# Patient Record
Sex: FEMALE | Race: WHITE | NOT HISPANIC OR LATINO | Employment: OTHER | URBAN - METROPOLITAN AREA
[De-identification: names, ages, dates, MRNs, and addresses within clinical notes are randomized per-mention and may not be internally consistent; named-entity substitution may affect disease eponyms.]

---

## 2022-05-04 ENCOUNTER — TELEPHONE (OUTPATIENT)
Dept: SURGERY | Facility: CLINIC | Age: 52
End: 2022-05-04
Payer: MEDICAID

## 2022-05-04 NOTE — TELEPHONE ENCOUNTER
Spoke with patient regarding appointment for referral. Instructed patient that we would need to get records for her scheduled appointment to discuss possible colostomy reversal. Records requested from [facility]. Patient states that she will go to medical records to obtain disc of imaging to bring with her to appointment. Appointment scheduled with Dr. Garces (patient would like soonest appointment available) and details confirmed verbally with patient.

## 2022-05-11 NOTE — PROGRESS NOTES
CRS Office Visit History and Physical    Referring Md:   No referring provider defined for this encounter.    SUBJECTIVE:     Chief Complaint: colostomy     History of Present Illness:  The patient is a new patient to this practice.   Course is as follows:  Marika Lindsey is a 51 y.o. female presents with an unwanted end colostomy.  She was hospitalized at [facility] in January 2022 for perforated diverticulitis.  She underwent two operations and has been doing wound care for her midline wound.  She is no longer requiring a wound vac.  She reports that she feels well physically and is eating well.  She has gained weight.  She is doing well with her colostomy.  She was seeing Dr. Gotti at [facility] for reversal but had last minute problems with her insurance.  She has had a colonoscopy since her surgery.  Labs drawn by Dr. Gotti were reportedly appropriate for surgery.     Last Colonoscopy: 2022      PMH:   perforated diverticulitis   hypothyroidism    PSH:   Exploratory laparotomy, sigmoid colectomy with end colostomy 1/2022, [facility]     FmHx: reviewed and noncontributory     Allergies: reviewed, NKDA     Review of Systems:  Review of Systems   Constitutional: Negative.    HENT: Negative.    Eyes: Negative.    Respiratory: Negative.    Cardiovascular: Negative.    Gastrointestinal:        See HPI   Genitourinary: Negative.    Musculoskeletal: Negative.    Skin: Negative.    Neurological: Negative.    Psychiatric/Behavioral: Negative.        OBJECTIVE:     Vital Signs (Most Recent)  There were no vitals taken for this visit.    Physical Exam:  General: 51 y.o. female in no distress   Neuro: alert and oriented x 4.  Moves all extremities.     HEENT: normocephalic, atraumatic, PERRL, EOMI   Respiratory: respirations are even and unlabored  Cardiac: regular rate and rhythm  Abdomen: soft, midline incision well small area of granulation tissue at superior aspect, larger area of granulation tissue at inferior aspect, no  erythema or drainage, end colostomy in place in LLQ   Extremities: Warm dry and intact  Skin: no rashes      Labs: will obtain outside records    Imaging: outside imaging obtained and personally reviewed       ASSESSMENT/PLAN:     There are no diagnoses linked to this encounter.    51 y.o. female with recent episode of perforated diverticulitis s/p exploratory laparotomy, sigmoid colectomy and end colostomy    - patient's outside records reviewed   - patient has had pre-op workup by Dr. Gotti.  Will obtain records from [facility]  - Surgery planned for 6/14.  Discussed possible conversion to open procedure.  Would like midline wound healed prior to proceeding.    - Follow up on 6/6 for pre-op paperwork     Adelina Garces MD  Staff Surgeon  Colon & Rectal Surgery

## 2022-05-13 ENCOUNTER — OFFICE VISIT (OUTPATIENT)
Dept: SURGERY | Facility: OTHER | Age: 52
End: 2022-05-13
Attending: SURGERY
Payer: MEDICAID

## 2022-05-13 VITALS
WEIGHT: 243.19 LBS | BODY MASS INDEX: 41.52 KG/M2 | SYSTOLIC BLOOD PRESSURE: 140 MMHG | HEIGHT: 64 IN | HEART RATE: 80 BPM | DIASTOLIC BLOOD PRESSURE: 83 MMHG

## 2022-05-13 DIAGNOSIS — Z93.3 COLOSTOMY IN PLACE: Primary | ICD-10-CM

## 2022-05-13 PROCEDURE — 99999 PR PBB SHADOW E&M-EST. PATIENT-LVL IV: ICD-10-PCS | Mod: PBBFAC,,, | Performed by: SURGERY

## 2022-05-13 PROCEDURE — 1159F PR MEDICATION LIST DOCUMENTED IN MEDICAL RECORD: ICD-10-PCS | Mod: CPTII,,, | Performed by: SURGERY

## 2022-05-13 PROCEDURE — 3079F DIAST BP 80-89 MM HG: CPT | Mod: CPTII,,, | Performed by: SURGERY

## 2022-05-13 PROCEDURE — 99204 OFFICE O/P NEW MOD 45 MIN: CPT | Mod: S$PBB,,, | Performed by: SURGERY

## 2022-05-13 PROCEDURE — 3077F SYST BP >= 140 MM HG: CPT | Mod: CPTII,,, | Performed by: SURGERY

## 2022-05-13 PROCEDURE — 3077F PR MOST RECENT SYSTOLIC BLOOD PRESSURE >= 140 MM HG: ICD-10-PCS | Mod: CPTII,,, | Performed by: SURGERY

## 2022-05-13 PROCEDURE — 3008F BODY MASS INDEX DOCD: CPT | Mod: CPTII,,, | Performed by: SURGERY

## 2022-05-13 PROCEDURE — 99204 PR OFFICE/OUTPT VISIT, NEW, LEVL IV, 45-59 MIN: ICD-10-PCS | Mod: S$PBB,,, | Performed by: SURGERY

## 2022-05-13 PROCEDURE — 99214 OFFICE O/P EST MOD 30 MIN: CPT | Mod: PBBFAC | Performed by: SURGERY

## 2022-05-13 PROCEDURE — 3008F PR BODY MASS INDEX (BMI) DOCUMENTED: ICD-10-PCS | Mod: CPTII,,, | Performed by: SURGERY

## 2022-05-13 PROCEDURE — 3079F PR MOST RECENT DIASTOLIC BLOOD PRESSURE 80-89 MM HG: ICD-10-PCS | Mod: CPTII,,, | Performed by: SURGERY

## 2022-05-13 PROCEDURE — 1159F MED LIST DOCD IN RCRD: CPT | Mod: CPTII,,, | Performed by: SURGERY

## 2022-05-13 PROCEDURE — 99999 PR PBB SHADOW E&M-EST. PATIENT-LVL IV: CPT | Mod: PBBFAC,,, | Performed by: SURGERY

## 2022-05-13 RX ORDER — LEVOTHYROXINE SODIUM 50 UG/1
50 TABLET ORAL
COMMUNITY
End: 2023-05-22

## 2022-05-16 RX ORDER — ACETAMINOPHEN 500 MG
1000 TABLET ORAL
Status: CANCELLED | OUTPATIENT
Start: 2022-05-16 | End: 2022-05-16

## 2022-05-18 ENCOUNTER — TELEPHONE (OUTPATIENT)
Dept: SURGERY | Facility: CLINIC | Age: 52
End: 2022-05-18
Payer: MEDICAID

## 2022-05-18 NOTE — TELEPHONE ENCOUNTER
Contacted [facility] to follow-up on request for colonoscopy report, pathology and operative reports. Informed that her information would have to be obtained through medical records.

## 2022-05-20 ENCOUNTER — TELEPHONE (OUTPATIENT)
Dept: SURGERY | Facility: CLINIC | Age: 52
End: 2022-05-20
Payer: MEDICAID

## 2022-06-06 ENCOUNTER — TELEPHONE (OUTPATIENT)
Dept: SURGERY | Facility: CLINIC | Age: 52
End: 2022-06-06
Payer: MEDICAID

## 2022-06-06 ENCOUNTER — OFFICE VISIT (OUTPATIENT)
Dept: SURGERY | Facility: OTHER | Age: 52
End: 2022-06-06
Attending: SURGERY
Payer: MEDICAID

## 2022-06-06 VITALS
SYSTOLIC BLOOD PRESSURE: 152 MMHG | DIASTOLIC BLOOD PRESSURE: 80 MMHG | HEIGHT: 64 IN | HEART RATE: 79 BPM | BODY MASS INDEX: 42.12 KG/M2 | WEIGHT: 246.69 LBS

## 2022-06-06 DIAGNOSIS — Z93.3 COLOSTOMY IN PLACE: Primary | ICD-10-CM

## 2022-06-06 PROCEDURE — 99213 OFFICE O/P EST LOW 20 MIN: CPT | Mod: PBBFAC | Performed by: SURGERY

## 2022-06-06 PROCEDURE — 3079F DIAST BP 80-89 MM HG: CPT | Mod: CPTII,,, | Performed by: SURGERY

## 2022-06-06 PROCEDURE — 3077F SYST BP >= 140 MM HG: CPT | Mod: CPTII,,, | Performed by: SURGERY

## 2022-06-06 PROCEDURE — 99213 OFFICE O/P EST LOW 20 MIN: CPT | Mod: S$PBB,,, | Performed by: SURGERY

## 2022-06-06 PROCEDURE — 99999 PR PBB SHADOW E&M-EST. PATIENT-LVL III: CPT | Mod: PBBFAC,,, | Performed by: SURGERY

## 2022-06-06 PROCEDURE — 1159F PR MEDICATION LIST DOCUMENTED IN MEDICAL RECORD: ICD-10-PCS | Mod: CPTII,,, | Performed by: SURGERY

## 2022-06-06 PROCEDURE — 3008F BODY MASS INDEX DOCD: CPT | Mod: CPTII,,, | Performed by: SURGERY

## 2022-06-06 PROCEDURE — 1159F MED LIST DOCD IN RCRD: CPT | Mod: CPTII,,, | Performed by: SURGERY

## 2022-06-06 PROCEDURE — 3077F PR MOST RECENT SYSTOLIC BLOOD PRESSURE >= 140 MM HG: ICD-10-PCS | Mod: CPTII,,, | Performed by: SURGERY

## 2022-06-06 PROCEDURE — 3008F PR BODY MASS INDEX (BMI) DOCUMENTED: ICD-10-PCS | Mod: CPTII,,, | Performed by: SURGERY

## 2022-06-06 PROCEDURE — 99999 PR PBB SHADOW E&M-EST. PATIENT-LVL III: ICD-10-PCS | Mod: PBBFAC,,, | Performed by: SURGERY

## 2022-06-06 PROCEDURE — 3079F PR MOST RECENT DIASTOLIC BLOOD PRESSURE 80-89 MM HG: ICD-10-PCS | Mod: CPTII,,, | Performed by: SURGERY

## 2022-06-06 PROCEDURE — 99213 PR OFFICE/OUTPT VISIT, EST, LEVL III, 20-29 MIN: ICD-10-PCS | Mod: S$PBB,,, | Performed by: SURGERY

## 2022-06-06 RX ORDER — NEOMYCIN SULFATE 500 MG/1
TABLET ORAL
Qty: 6 TABLET | Refills: 0 | Status: ON HOLD | OUTPATIENT
Start: 2022-06-06 | End: 2022-06-16 | Stop reason: HOSPADM

## 2022-06-06 RX ORDER — METRONIDAZOLE 500 MG/1
TABLET ORAL
Qty: 3 TABLET | Refills: 0 | Status: ON HOLD | OUTPATIENT
Start: 2022-06-06 | End: 2022-06-16 | Stop reason: HOSPADM

## 2022-06-06 NOTE — TELEPHONE ENCOUNTER
Spoke with pt and advised to call wound care nurse to discuss if she would be treated by hospital staff while inpatient or regular home health nurse. Pt confirmed.

## 2022-06-06 NOTE — H&P (VIEW-ONLY)
Colon & Rectal Surgery Clinic Follow Up    HPI:   Marika Lindsey is a 51 y.o. female who presents for follow up for pre-op visit prior to ostomy closure.  Her midline wound has continued to heal, very small area of granulation tissue remains.  Continues to eat well.  Ostomy functioning without difficulty.  Walking and staying active.  Records obtained from [facility] and reviewed.       Objective:   Vitals:    06/06/22 0852   BP: (!) 152/80   Pulse: 79        Physical Exam   Gen: well developed female, NAD   HEENT: normocephalic, atraumatic, PERRL, EOMI   CV: RRR, no murmurs  Res: nonlabored, CTAB   Abd: soft, midline incision with small area of granulation tissue at base, ostomy pink and patent  MSK: no gross deformities, no cyanosis or edema   Neuro: II-XII grossly intact     Assessment and Plan:   Marika Lindsey  is a 51 y.o. female who presents for follow up for upcoming ostomy reversal    - Outside records from [facility] reviewed.  Endoscopy report from Dr. Gotti with approximately 20 cm rectal stump.    - Patient's wound is healing well.  Appropriate to proceed with ostomy reversal.   - We discussed the risks, benefits and alternatives of the procedure.  We discussed the risk of anastomotic leak, permanent changes in bowel function, wound complications.  Patient does have a remote history of ureteral reconstruction as a child in the 1970s.    - Consent obtained.  Proceed with surgery on 6/14.       Adelina Garces MD  Staff Surgeon   Colon & Rectal Surgery

## 2022-06-06 NOTE — TELEPHONE ENCOUNTER
"Spoke with pt regarding wound that is being treated by wound care. Pt stated, "she did not know if she needed to coordinate with wound care while she was admitted for her procedure." Advised per Dr. Garces that she should not need wound care during stay but if needed, it would be done in hospital. Pt expressed understanding and stated, "It's just a lot to have to handle or think about before the procedure."       ----- Message from Marika Ureña sent at 6/6/2022 12:16 PM CDT -----  Contact: @ [phone]  Pt is calling she would like a nurse to call she had a appointment this morning and she wants to tell them something they need to know before her surgery please call @ [phone]      "

## 2022-06-06 NOTE — PROGRESS NOTES
Colon & Rectal Surgery Clinic Follow Up    HPI:   Marika Lindsey is a 51 y.o. female who presents for follow up for pre-op visit prior to ostomy closure.  Her midline wound has continued to heal, very small area of granulation tissue remains.  Continues to eat well.  Ostomy functioning without difficulty.  Walking and staying active.  Records obtained from [facility] and reviewed.       Objective:   Vitals:    06/06/22 0852   BP: (!) 152/80   Pulse: 79        Physical Exam   Gen: well developed female, NAD   HEENT: normocephalic, atraumatic, PERRL, EOMI   CV: RRR, no murmurs  Res: nonlabored, CTAB   Abd: soft, midline incision with small area of granulation tissue at base, ostomy pink and patent  MSK: no gross deformities, no cyanosis or edema   Neuro: II-XII grossly intact     Assessment and Plan:   Marika Lindsey  is a 51 y.o. female who presents for follow up for upcoming ostomy reversal    - Outside records from [facility] reviewed.  Endoscopy report from Dr. Gotti with approximately 20 cm rectal stump.    - Patient's wound is healing well.  Appropriate to proceed with ostomy reversal.   - We discussed the risks, benefits and alternatives of the procedure.  We discussed the risk of anastomotic leak, permanent changes in bowel function, wound complications.  Patient does have a remote history of ureteral reconstruction as a child in the 1970s.    - Consent obtained.  Proceed with surgery on 6/14.       Adelina Garces MD  Staff Surgeon   Colon & Rectal Surgery

## 2022-06-07 ENCOUNTER — ANESTHESIA EVENT (OUTPATIENT)
Dept: SURGERY | Facility: OTHER | Age: 52
DRG: 330 | End: 2022-06-07
Payer: MEDICAID

## 2022-06-07 ENCOUNTER — HOSPITAL ENCOUNTER (OUTPATIENT)
Dept: PREADMISSION TESTING | Facility: OTHER | Age: 52
Discharge: HOME | End: 2022-06-07
Attending: SURGERY
Payer: MEDICAID

## 2022-06-07 VITALS
HEIGHT: 64 IN | OXYGEN SATURATION: 97 % | HEART RATE: 85 BPM | BODY MASS INDEX: 41.83 KG/M2 | RESPIRATION RATE: 16 BRPM | DIASTOLIC BLOOD PRESSURE: 85 MMHG | WEIGHT: 245 LBS | TEMPERATURE: 98 F | SYSTOLIC BLOOD PRESSURE: 127 MMHG

## 2022-06-07 DIAGNOSIS — Z01.818 PREOP TESTING: Primary | ICD-10-CM

## 2022-06-07 LAB
ABO + RH BLD: NORMAL
ANION GAP SERPL CALC-SCNC: 11 MMOL/L (ref 8–16)
BASOPHILS # BLD AUTO: 0.04 K/UL (ref 0–0.2)
BASOPHILS NFR BLD: 0.6 % (ref 0–1.9)
BLD GP AB SCN CELLS X3 SERPL QL: NORMAL
BUN SERPL-MCNC: 19 MG/DL (ref 6–20)
CALCIUM SERPL-MCNC: 10 MG/DL (ref 8.7–10.5)
CHLORIDE SERPL-SCNC: 104 MMOL/L (ref 95–110)
CO2 SERPL-SCNC: 23 MMOL/L (ref 23–29)
CREAT SERPL-MCNC: 0.9 MG/DL (ref 0.5–1.4)
DIFFERENTIAL METHOD: ABNORMAL
EOSINOPHIL # BLD AUTO: 0.3 K/UL (ref 0–0.5)
EOSINOPHIL NFR BLD: 3.7 % (ref 0–8)
ERYTHROCYTE [DISTWIDTH] IN BLOOD BY AUTOMATED COUNT: 15.4 % (ref 11.5–14.5)
EST. GFR  (AFRICAN AMERICAN): >60 ML/MIN/1.73 M^2
EST. GFR  (NON AFRICAN AMERICAN): >60 ML/MIN/1.73 M^2
GLUCOSE SERPL-MCNC: 97 MG/DL (ref 70–110)
HCT VFR BLD AUTO: 41.2 % (ref 37–48.5)
HGB BLD-MCNC: 13.1 G/DL (ref 12–16)
IMM GRANULOCYTES # BLD AUTO: 0.01 K/UL (ref 0–0.04)
IMM GRANULOCYTES NFR BLD AUTO: 0.1 % (ref 0–0.5)
LYMPHOCYTES # BLD AUTO: 1.7 K/UL (ref 1–4.8)
LYMPHOCYTES NFR BLD: 23.8 % (ref 18–48)
MCH RBC QN AUTO: 26 PG (ref 27–31)
MCHC RBC AUTO-ENTMCNC: 31.8 G/DL (ref 32–36)
MCV RBC AUTO: 82 FL (ref 82–98)
MONOCYTES # BLD AUTO: 0.6 K/UL (ref 0.3–1)
MONOCYTES NFR BLD: 8 % (ref 4–15)
NEUTROPHILS # BLD AUTO: 4.5 K/UL (ref 1.8–7.7)
NEUTROPHILS NFR BLD: 63.8 % (ref 38–73)
NRBC BLD-RTO: 0 /100 WBC
PLATELET # BLD AUTO: 299 K/UL (ref 150–450)
PMV BLD AUTO: 9.8 FL (ref 9.2–12.9)
POTASSIUM SERPL-SCNC: 4.8 MMOL/L (ref 3.5–5.1)
RBC # BLD AUTO: 5.04 M/UL (ref 4–5.4)
SODIUM SERPL-SCNC: 138 MMOL/L (ref 136–145)
WBC # BLD AUTO: 6.98 K/UL (ref 3.9–12.7)

## 2022-06-07 PROCEDURE — 86850 RBC ANTIBODY SCREEN: CPT | Performed by: NURSE PRACTITIONER

## 2022-06-07 PROCEDURE — 86900 BLOOD TYPING SEROLOGIC ABO: CPT | Performed by: NURSE PRACTITIONER

## 2022-06-07 PROCEDURE — 80048 BASIC METABOLIC PNL TOTAL CA: CPT | Performed by: ANESTHESIOLOGY

## 2022-06-07 PROCEDURE — 36415 COLL VENOUS BLD VENIPUNCTURE: CPT | Performed by: ANESTHESIOLOGY

## 2022-06-07 PROCEDURE — 85025 COMPLETE CBC W/AUTO DIFF WBC: CPT | Performed by: ANESTHESIOLOGY

## 2022-06-07 RX ORDER — ASCORBIC ACID 500 MG
500 TABLET ORAL DAILY
COMMUNITY
End: 2023-05-05

## 2022-06-07 RX ORDER — ACETAMINOPHEN 500 MG
1000 TABLET ORAL
Status: CANCELLED | OUTPATIENT
Start: 2022-06-07 | End: 2022-06-07

## 2022-06-07 RX ORDER — NICOTINE POLACRILEX 2 MG
1 GUM BUCCAL
COMMUNITY
End: 2023-05-05

## 2022-06-07 RX ORDER — ACETAMINOPHEN 500 MG
500 TABLET ORAL EVERY 6 HOURS PRN
COMMUNITY
End: 2022-12-30

## 2022-06-07 RX ORDER — SODIUM CHLORIDE, SODIUM LACTATE, POTASSIUM CHLORIDE, CALCIUM CHLORIDE 600; 310; 30; 20 MG/100ML; MG/100ML; MG/100ML; MG/100ML
INJECTION, SOLUTION INTRAVENOUS CONTINUOUS
Status: CANCELLED | OUTPATIENT
Start: 2022-06-07

## 2022-06-07 RX ORDER — LIDOCAINE HYDROCHLORIDE 10 MG/ML
0.5 INJECTION, SOLUTION EPIDURAL; INFILTRATION; INTRACAUDAL; PERINEURAL ONCE
Status: CANCELLED | OUTPATIENT
Start: 2022-06-07 | End: 2022-06-07

## 2022-06-07 RX ORDER — CHOLECALCIFEROL (VITAMIN D3) 25 MCG
5000 TABLET ORAL DAILY
COMMUNITY
End: 2022-12-30 | Stop reason: SDUPTHER

## 2022-06-07 RX ORDER — FAMOTIDINE 20 MG/1
20 TABLET, FILM COATED ORAL
Status: CANCELLED | OUTPATIENT
Start: 2022-06-07 | End: 2022-06-07

## 2022-06-07 RX ORDER — PREGABALIN 50 MG/1
50 CAPSULE ORAL ONCE
Status: CANCELLED | OUTPATIENT
Start: 2022-06-07 | End: 2022-06-07

## 2022-06-07 NOTE — DISCHARGE INSTRUCTIONS
Information to Prepare you for your Surgery    PRE-ADMIT TESTING -  [phone]    [address] [facility]          Your surgery has been scheduled at [facility]. We are pleased to have the opportunity to serve you. For Further Information please call [phone].    On the day of surgery please report to the Information Desk on the 1st floor.    CONTACT YOUR PHYSICIAN'S OFFICE THE DAY PRIOR TO YOUR SURGERY TO OBTAIN YOUR ARRIVAL TIME.     The evening before surgery do not eat anything after 9 p.m. ( this includes hard candy, chewing gum and mints).  You may only have GATORADE, POWERADE AND WATER  from 9 p.m. until you leave your home.   DO NOT DRINK ANY LIQUIDS ON THE WAY TO THE HOSPITAL.      Why does your anesthesiologist allow you to drink Gatorade/Powerade before surgery?  Gatorade/Powerade helps to increase your comfort before surgery and to decrease your nausea after surgery. The carbohydrates in Gatorade/Powerade help reduce your body's stress response to surgery.  If you are a diabetic-drink only water prior to surgery.      Current Visitor policy(12/27/2021) - Patients may have 2 visitors pre and post procedure. Only 2 visitors will be allowed in the Surgical building with the patient.     SPECIAL MEDICATION INSTRUCTIONS: TAKE medications checked off by the Anesthesiologist on your Medication List.    Angiogram Patients: Take medications as instructed by your physician, including aspirin.     Surgery Patients:    If you take ASPIRIN - Your PHYSICIAN/SURGEON will need to inform you IF/OR when you need to stop taking aspirin prior to your surgery.     Do Not take any medications containing IBUPROFEN.    Do Not Wear any make-up (especially eye make-up) to surgery. Please remove any false eyelashes or eyelash extensions. If you arrive the day of surgery with makeup/eyelashes on you will be required to remove prior to surgery. (There is a risk of corneal  abrasions if eye makeup/eyelash extensions are not removed)      Leave all valuables at home.   Do Not wear any jewelry or watches, including any metal in body piercings. Jewelry must be removed prior to coming to the hospital.  There is a possibility that rings that are unable to be removed may be cut off if they are on the surgical extremity.    Please remove all hair extensions, wigs, clips and any other metal accessories/ ornaments from your hair.  These items may pose a flammable/fire risk in Surgery and must be removed.    Do not shave your surgical area at least 5 days prior to your surgery. The surgical prep will be performed at the hospital according to Infection Control regulations.    Contact Lens must be removed before surgery. Either do not wear the contact lens or bring a case and solution for storage.  Please bring a container for eyeglasses or dentures as required.  Bring any paperwork your physician has provided, such as consent forms,  history and physicals, doctor's orders, etc.   Bring comfortable clothes that are loose fitting to wear upon discharge. Take into consideration the type of surgery being performed.  Maintain your diet as advised per your physician the day prior to surgery.      Adequate rest the night before surgery is advised.   Park in the Parking lot behind the hospital or in the [facility] across the street from the parking lot. Parking is complimentary.  If you will be discharged the same day as your procedure, please arrange for a responsible adult to drive you home or to accompany you if traveling by taxi.   YOU WILL NOT BE PERMITTED TO DRIVE OR TO LEAVE THE HOSPITAL ALONE AFTER SURGERY.   If you are being discharged the same day, it is strongly recommended that you arrange for someone to remain with you for the first 24 hrs following your surgery.    The Surgeon will speak to your family/visitor after your surgery regarding the outcome of your surgery and post op  care.  The Surgeon may speak to you after your surgery, but there is a possibility you may not remember the details.  Please check with your family members regarding the conversation with the Surgeon.    We strongly recommend whoever is bringing you home be present for discharge instructions.  This will ensure a thorough understanding for your post op home care.    ALL CHILDREN MUST ALWAYS BE ACCOMPANIED BY AN ADULT.    Visitors-Refer to current Visitor policy handouts.    Thank you for your cooperation.  The Staff of [facility].            Bathing Instructions with Hibiclens    Shower the evening before and morning of your procedure with Hibiclens:  Wash your face with water and your regular face wash/soap  Apply Hibiclens directly on your skin or on a wet washcloth and wash gently. When showering: Move away from the shower stream when applying Hibiclens to avoid rinsing off too soon.  Rinse thoroughly with warm water  Do not dilute Hibiclens        Dry off as usual, do not use any deodorant, powder, body lotions, perfume, after shave or cologne.

## 2022-06-07 NOTE — ANESTHESIA PREPROCEDURE EVALUATION
06/07/2022  Marika Lindsey is a 51 y.o., female.      Pre-op Assessment    I have reviewed the Patient Summary Reports.     I have reviewed the Nursing Notes. I have reviewed the NPO Status.   I have reviewed the Medications.     Review of Systems  Anesthesia Hx:  History of prior surgery of interest to airway management or planning: Previous anesthesia: General multiple GI surgeries past year with general anesthesia.  Denies Family Hx of Anesthesia complications.   Denies Personal Hx of Anesthesia complications.   Social:  Non-Smoker    Hematology/Oncology:  Hematology Normal   Oncology Normal     EENT/Dental:EENT/Dental Normal   Cardiovascular:  Cardiovascular Normal     Pulmonary:  Pulmonary Normal    Renal/:  Renal/ Normal     Hepatic/GI:   GERD perf diverticula, multiple surgeries following, now for colostomy closure   Musculoskeletal:  Musculoskeletal Normal    Neurological:  Neurology Normal    Endocrine:   Hypothyroidism  Morbid Obesity / BMI > 40  Dermatological:  Skin Normal    Psych:  Psychiatric Normal           Physical Exam  General: Cooperative, Alert and Oriented    Airway:  Mallampati: I   Mouth Opening: Normal  TM Distance: Normal  Neck ROM: Normal ROM    Dental:  Intact        Anesthesia Plan  Type of Anesthesia, risks & benefits discussed:    Anesthesia Type: Gen ETT  Intra-op Monitoring Plan: Standard ASA Monitors  Post Op Pain Control Plan: multimodal analgesia, IV/PO Opioids PRN and peripheral nerve block  Induction:  IV  Airway Plan: Video  Informed Consent: Informed consent signed with the Patient and all parties understand the risks and agree with anesthesia plan.  All questions answered.   ASA Score: 3  Anesthesia Plan Notes: TAP discussed if possible with position of colostomy, pt reports abdominal wound anesthetic    Labs today        Ready For Surgery From Anesthesia  Perspective.     .

## 2022-06-13 ENCOUNTER — TELEPHONE (OUTPATIENT)
Dept: SURGERY | Facility: CLINIC | Age: 52
End: 2022-06-13
Payer: MEDICAID

## 2022-06-13 NOTE — TELEPHONE ENCOUNTER
Spoke with patient informing of 0500 arrival time for procedure. Location and time confirmed by patient. Patient inquired about wound care following surgery and informed that wound care would be provided while inpatient by staff. Patient verbalized understanding.

## 2022-06-14 ENCOUNTER — HOSPITAL ENCOUNTER (INPATIENT)
Facility: OTHER | Age: 52
LOS: 2 days | Discharge: HOME HEALTH | DRG: 330 | End: 2022-06-16
Attending: SURGERY | Admitting: SURGERY
Payer: MEDICAID

## 2022-06-14 ENCOUNTER — ANESTHESIA (OUTPATIENT)
Dept: SURGERY | Facility: OTHER | Age: 52
DRG: 330 | End: 2022-06-14
Payer: MEDICAID

## 2022-06-14 DIAGNOSIS — Z93.3 COLOSTOMY PRESENT: ICD-10-CM

## 2022-06-14 PROCEDURE — 37000008 HC ANESTHESIA 1ST 15 MINUTES: Performed by: SURGERY

## 2022-06-14 PROCEDURE — P9045 ALBUMIN (HUMAN), 5%, 250 ML: HCPCS | Mod: JG | Performed by: NURSE ANESTHETIST, CERTIFIED REGISTERED

## 2022-06-14 PROCEDURE — 88304 PR  SURG PATH,LEVEL III: ICD-10-PCS | Mod: 26,,, | Performed by: PATHOLOGY

## 2022-06-14 PROCEDURE — 63600175 PHARM REV CODE 636 W HCPCS: Performed by: ANESTHESIOLOGY

## 2022-06-14 PROCEDURE — 37000009 HC ANESTHESIA EA ADD 15 MINS: Performed by: SURGERY

## 2022-06-14 PROCEDURE — 25000003 PHARM REV CODE 250: Performed by: NURSE PRACTITIONER

## 2022-06-14 PROCEDURE — 63600175 PHARM REV CODE 636 W HCPCS: Performed by: NURSE PRACTITIONER

## 2022-06-14 PROCEDURE — 25000003 PHARM REV CODE 250: Performed by: ANESTHESIOLOGY

## 2022-06-14 PROCEDURE — 44227 PR LAP, SURG CLOSE ENTEROSTOMY RESECT ANAST: ICD-10-PCS | Mod: ,,, | Performed by: SURGERY

## 2022-06-14 PROCEDURE — 25000003 PHARM REV CODE 250: Performed by: SURGERY

## 2022-06-14 PROCEDURE — 36000713 HC OR TIME LEV V EA ADD 15 MIN: Performed by: SURGERY

## 2022-06-14 PROCEDURE — A4216 STERILE WATER/SALINE, 10 ML: HCPCS | Performed by: NURSE ANESTHETIST, CERTIFIED REGISTERED

## 2022-06-14 PROCEDURE — C9290 INJ, BUPIVACAINE LIPOSOME: HCPCS | Performed by: ANESTHESIOLOGY

## 2022-06-14 PROCEDURE — 71000033 HC RECOVERY, INTIAL HOUR: Performed by: SURGERY

## 2022-06-14 PROCEDURE — 36000712 HC OR TIME LEV V 1ST 15 MIN: Performed by: SURGERY

## 2022-06-14 PROCEDURE — 44227 LAP CLOSE ENTEROSTOMY: CPT | Mod: ,,, | Performed by: SURGERY

## 2022-06-14 PROCEDURE — 63600175 PHARM REV CODE 636 W HCPCS: Performed by: SURGERY

## 2022-06-14 PROCEDURE — 88304 TISSUE EXAM BY PATHOLOGIST: CPT | Performed by: PATHOLOGY

## 2022-06-14 PROCEDURE — 27201423 OPTIME MED/SURG SUP & DEVICES STERILE SUPPLY: Performed by: SURGERY

## 2022-06-14 PROCEDURE — 99900035 HC TECH TIME PER 15 MIN (STAT)

## 2022-06-14 PROCEDURE — 71000039 HC RECOVERY, EACH ADD'L HOUR: Performed by: SURGERY

## 2022-06-14 PROCEDURE — 63600175 PHARM REV CODE 636 W HCPCS: Performed by: NURSE ANESTHETIST, CERTIFIED REGISTERED

## 2022-06-14 PROCEDURE — 88304 TISSUE EXAM BY PATHOLOGIST: CPT | Mod: 26,,, | Performed by: PATHOLOGY

## 2022-06-14 PROCEDURE — 94799 UNLISTED PULMONARY SVC/PX: CPT

## 2022-06-14 PROCEDURE — 64488 TAP BLOCK BI INJECTION: CPT | Performed by: ANESTHESIOLOGY

## 2022-06-14 PROCEDURE — 11000001 HC ACUTE MED/SURG PRIVATE ROOM

## 2022-06-14 PROCEDURE — 25000003 PHARM REV CODE 250: Performed by: NURSE ANESTHETIST, CERTIFIED REGISTERED

## 2022-06-14 PROCEDURE — S0030 INJECTION, METRONIDAZOLE: HCPCS | Performed by: NURSE PRACTITIONER

## 2022-06-14 RX ORDER — ONDANSETRON 2 MG/ML
INJECTION INTRAMUSCULAR; INTRAVENOUS
Status: DISCONTINUED | OUTPATIENT
Start: 2022-06-14 | End: 2022-06-14

## 2022-06-14 RX ORDER — PROCHLORPERAZINE EDISYLATE 5 MG/ML
5 INJECTION INTRAMUSCULAR; INTRAVENOUS EVERY 30 MIN PRN
Status: DISCONTINUED | OUTPATIENT
Start: 2022-06-14 | End: 2022-06-14 | Stop reason: HOSPADM

## 2022-06-14 RX ORDER — SODIUM CHLORIDE 9 MG/ML
INJECTION, SOLUTION INTRAVENOUS CONTINUOUS
Status: DISCONTINUED | OUTPATIENT
Start: 2022-06-14 | End: 2022-06-15

## 2022-06-14 RX ORDER — PREGABALIN 50 MG/1
50 CAPSULE ORAL ONCE
Status: DISCONTINUED | OUTPATIENT
Start: 2022-06-14 | End: 2022-06-14

## 2022-06-14 RX ORDER — PHENYLEPHRINE HYDROCHLORIDE 10 MG/ML
INJECTION INTRAVENOUS
Status: DISCONTINUED | OUTPATIENT
Start: 2022-06-14 | End: 2022-06-14

## 2022-06-14 RX ORDER — LIDOCAINE HYDROCHLORIDE 10 MG/ML
1 INJECTION, SOLUTION EPIDURAL; INFILTRATION; INTRACAUDAL; PERINEURAL ONCE
Status: ACTIVE | OUTPATIENT
Start: 2022-06-14

## 2022-06-14 RX ORDER — MUPIROCIN 20 MG/G
OINTMENT TOPICAL
Status: DISPENSED | OUTPATIENT
Start: 2022-06-14

## 2022-06-14 RX ORDER — GABAPENTIN 300 MG/1
300 CAPSULE ORAL 3 TIMES DAILY
Status: DISCONTINUED | OUTPATIENT
Start: 2022-06-14 | End: 2022-06-16 | Stop reason: HOSPADM

## 2022-06-14 RX ORDER — KETAMINE HCL IN 0.9 % NACL 50 MG/5 ML
SYRINGE (ML) INTRAVENOUS
Status: DISCONTINUED | OUTPATIENT
Start: 2022-06-14 | End: 2022-06-14

## 2022-06-14 RX ORDER — ROCURONIUM BROMIDE 10 MG/ML
INJECTION, SOLUTION INTRAVENOUS
Status: DISCONTINUED | OUTPATIENT
Start: 2022-06-14 | End: 2022-06-14

## 2022-06-14 RX ORDER — FAMOTIDINE 20 MG/1
20 TABLET, FILM COATED ORAL
Status: COMPLETED | OUTPATIENT
Start: 2022-06-14 | End: 2022-06-14

## 2022-06-14 RX ORDER — LIDOCAINE HYDROCHLORIDE 20 MG/ML
INJECTION INTRAVENOUS
Status: DISCONTINUED | OUTPATIENT
Start: 2022-06-14 | End: 2022-06-14

## 2022-06-14 RX ORDER — DIPHENHYDRAMINE HYDROCHLORIDE 50 MG/ML
INJECTION INTRAMUSCULAR; INTRAVENOUS
Status: DISCONTINUED | OUTPATIENT
Start: 2022-06-14 | End: 2022-06-14

## 2022-06-14 RX ORDER — ALBUMIN HUMAN 50 G/1000ML
SOLUTION INTRAVENOUS CONTINUOUS PRN
Status: DISCONTINUED | OUTPATIENT
Start: 2022-06-14 | End: 2022-06-14

## 2022-06-14 RX ORDER — ACETAMINOPHEN 500 MG
1000 TABLET ORAL
Status: COMPLETED | OUTPATIENT
Start: 2022-06-14 | End: 2022-06-14

## 2022-06-14 RX ORDER — BUPIVACAINE HYDROCHLORIDE 2.5 MG/ML
INJECTION, SOLUTION EPIDURAL; INFILTRATION; INTRACAUDAL
Status: COMPLETED | OUTPATIENT
Start: 2022-06-14 | End: 2022-06-14

## 2022-06-14 RX ORDER — INDOCYANINE GREEN AND WATER 25 MG
KIT INJECTION
Status: DISCONTINUED | OUTPATIENT
Start: 2022-06-14 | End: 2022-06-14

## 2022-06-14 RX ORDER — HYDROMORPHONE HYDROCHLORIDE 2 MG/ML
0.4 INJECTION, SOLUTION INTRAMUSCULAR; INTRAVENOUS; SUBCUTANEOUS EVERY 5 MIN PRN
Status: DISCONTINUED | OUTPATIENT
Start: 2022-06-14 | End: 2022-06-14 | Stop reason: HOSPADM

## 2022-06-14 RX ORDER — FENTANYL CITRATE 50 UG/ML
INJECTION, SOLUTION INTRAMUSCULAR; INTRAVENOUS
Status: DISCONTINUED | OUTPATIENT
Start: 2022-06-14 | End: 2022-06-14

## 2022-06-14 RX ORDER — PREGABALIN 50 MG/1
50 CAPSULE ORAL
Status: COMPLETED | OUTPATIENT
Start: 2022-06-14 | End: 2022-06-14

## 2022-06-14 RX ORDER — OXYCODONE HYDROCHLORIDE 5 MG/1
5 TABLET ORAL
Status: DISCONTINUED | OUTPATIENT
Start: 2022-06-14 | End: 2022-06-14 | Stop reason: HOSPADM

## 2022-06-14 RX ORDER — PROPOFOL 10 MG/ML
VIAL (ML) INTRAVENOUS
Status: DISCONTINUED | OUTPATIENT
Start: 2022-06-14 | End: 2022-06-14

## 2022-06-14 RX ORDER — METRONIDAZOLE 500 MG/100ML
500 INJECTION, SOLUTION INTRAVENOUS
Status: COMPLETED | OUTPATIENT
Start: 2022-06-14 | End: 2022-06-14

## 2022-06-14 RX ORDER — LIDOCAINE HYDROCHLORIDE 10 MG/ML
0.5 INJECTION, SOLUTION EPIDURAL; INFILTRATION; INTRACAUDAL; PERINEURAL ONCE
Status: DISCONTINUED | OUTPATIENT
Start: 2022-06-14 | End: 2022-06-14 | Stop reason: HOSPADM

## 2022-06-14 RX ORDER — DEXAMETHASONE SODIUM PHOSPHATE 4 MG/ML
INJECTION, SOLUTION INTRA-ARTICULAR; INTRALESIONAL; INTRAMUSCULAR; INTRAVENOUS; SOFT TISSUE
Status: DISCONTINUED | OUTPATIENT
Start: 2022-06-14 | End: 2022-06-14

## 2022-06-14 RX ORDER — SODIUM CHLORIDE 9 MG/ML
INJECTION, SOLUTION INTRAVENOUS CONTINUOUS
Status: DISCONTINUED | OUTPATIENT
Start: 2022-06-14 | End: 2022-06-14

## 2022-06-14 RX ORDER — SODIUM CHLORIDE, SODIUM LACTATE, POTASSIUM CHLORIDE, CALCIUM CHLORIDE 600; 310; 30; 20 MG/100ML; MG/100ML; MG/100ML; MG/100ML
INJECTION, SOLUTION INTRAVENOUS CONTINUOUS
Status: DISCONTINUED | OUTPATIENT
Start: 2022-06-14 | End: 2022-06-14

## 2022-06-14 RX ORDER — GABAPENTIN 100 MG/1
200 CAPSULE ORAL
Status: DISCONTINUED | OUTPATIENT
Start: 2022-06-14 | End: 2022-06-14

## 2022-06-14 RX ORDER — HYDROMORPHONE HYDROCHLORIDE 1 MG/ML
0.25 INJECTION, SOLUTION INTRAMUSCULAR; INTRAVENOUS; SUBCUTANEOUS EVERY 4 HOURS PRN
Status: DISCONTINUED | OUTPATIENT
Start: 2022-06-14 | End: 2022-06-16 | Stop reason: HOSPADM

## 2022-06-14 RX ORDER — LIDOCAINE HCL/PF 100 MG/5ML
SYRINGE (ML) INTRAVENOUS
Status: DISCONTINUED | OUTPATIENT
Start: 2022-06-14 | End: 2022-06-14

## 2022-06-14 RX ORDER — CEFTRIAXONE 2 G/50ML
2 INJECTION, SOLUTION INTRAVENOUS
Status: COMPLETED | OUTPATIENT
Start: 2022-06-14 | End: 2022-06-14

## 2022-06-14 RX ORDER — IBUPROFEN 400 MG/1
800 TABLET ORAL EVERY 8 HOURS
Status: DISCONTINUED | OUTPATIENT
Start: 2022-06-15 | End: 2022-06-16 | Stop reason: HOSPADM

## 2022-06-14 RX ORDER — ACETAMINOPHEN 500 MG
1000 TABLET ORAL
Status: DISCONTINUED | OUTPATIENT
Start: 2022-06-14 | End: 2022-06-14

## 2022-06-14 RX ORDER — SODIUM CHLORIDE 0.9 % (FLUSH) 0.9 %
10 SYRINGE (ML) INJECTION
Status: DISCONTINUED | OUTPATIENT
Start: 2022-06-14 | End: 2022-06-16 | Stop reason: HOSPADM

## 2022-06-14 RX ORDER — DIPHENHYDRAMINE HYDROCHLORIDE 50 MG/ML
25 INJECTION INTRAMUSCULAR; INTRAVENOUS EVERY 6 HOURS PRN
Status: DISCONTINUED | OUTPATIENT
Start: 2022-06-14 | End: 2022-06-14 | Stop reason: HOSPADM

## 2022-06-14 RX ORDER — HEPARIN SODIUM 5000 [USP'U]/ML
5000 INJECTION, SOLUTION INTRAVENOUS; SUBCUTANEOUS ONCE
Status: COMPLETED | OUTPATIENT
Start: 2022-06-14 | End: 2022-06-14

## 2022-06-14 RX ORDER — SODIUM CHLORIDE 0.9 % (FLUSH) 0.9 %
3 SYRINGE (ML) INJECTION
Status: DISCONTINUED | OUTPATIENT
Start: 2022-06-14 | End: 2022-06-16 | Stop reason: HOSPADM

## 2022-06-14 RX ORDER — IBUPROFEN 600 MG/1
600 TABLET ORAL
Status: DISCONTINUED | OUTPATIENT
Start: 2022-06-14 | End: 2022-06-14

## 2022-06-14 RX ORDER — OXYCODONE HYDROCHLORIDE 5 MG/1
5 TABLET ORAL EVERY 4 HOURS PRN
Status: DISCONTINUED | OUTPATIENT
Start: 2022-06-14 | End: 2022-06-16 | Stop reason: HOSPADM

## 2022-06-14 RX ORDER — ACETAMINOPHEN 500 MG
1000 TABLET ORAL EVERY 8 HOURS
Status: DISCONTINUED | OUTPATIENT
Start: 2022-06-15 | End: 2022-06-16 | Stop reason: HOSPADM

## 2022-06-14 RX ORDER — ACETAMINOPHEN 10 MG/ML
1000 INJECTION, SOLUTION INTRAVENOUS EVERY 8 HOURS
Status: COMPLETED | OUTPATIENT
Start: 2022-06-14 | End: 2022-06-15

## 2022-06-14 RX ORDER — ONDANSETRON 2 MG/ML
4 INJECTION INTRAMUSCULAR; INTRAVENOUS EVERY 8 HOURS PRN
Status: DISCONTINUED | OUTPATIENT
Start: 2022-06-14 | End: 2022-06-16 | Stop reason: HOSPADM

## 2022-06-14 RX ORDER — MEPERIDINE HYDROCHLORIDE 25 MG/ML
12.5 INJECTION INTRAMUSCULAR; INTRAVENOUS; SUBCUTANEOUS ONCE AS NEEDED
Status: DISCONTINUED | OUTPATIENT
Start: 2022-06-14 | End: 2022-06-14 | Stop reason: HOSPADM

## 2022-06-14 RX ORDER — MIDAZOLAM HYDROCHLORIDE 1 MG/ML
INJECTION INTRAMUSCULAR; INTRAVENOUS
Status: DISCONTINUED | OUTPATIENT
Start: 2022-06-14 | End: 2022-06-14

## 2022-06-14 RX ORDER — LEVOTHYROXINE SODIUM 50 UG/1
50 TABLET ORAL
Status: DISCONTINUED | OUTPATIENT
Start: 2022-06-15 | End: 2022-06-16 | Stop reason: HOSPADM

## 2022-06-14 RX ORDER — SCOLOPAMINE TRANSDERMAL SYSTEM 1 MG/1
1 PATCH, EXTENDED RELEASE TRANSDERMAL
Status: DISPENSED | OUTPATIENT
Start: 2022-06-14

## 2022-06-14 RX ORDER — MUPIROCIN 20 MG/G
OINTMENT TOPICAL 2 TIMES DAILY
Status: DISCONTINUED | OUTPATIENT
Start: 2022-06-14 | End: 2022-06-16 | Stop reason: HOSPADM

## 2022-06-14 RX ADMIN — SODIUM CHLORIDE: 0.9 INJECTION, SOLUTION INTRAVENOUS at 11:06

## 2022-06-14 RX ADMIN — GABAPENTIN 300 MG: 300 CAPSULE ORAL at 02:06

## 2022-06-14 RX ADMIN — ROCURONIUM BROMIDE 50 MG: 10 SOLUTION INTRAVENOUS at 07:06

## 2022-06-14 RX ADMIN — CEFTRIAXONE 2 G: 2 INJECTION, SOLUTION INTRAVENOUS at 07:06

## 2022-06-14 RX ADMIN — SODIUM CHLORIDE 0.5 MCG/KG/HR: 9 INJECTION, SOLUTION INTRAMUSCULAR; INTRAVENOUS; SUBCUTANEOUS at 07:06

## 2022-06-14 RX ADMIN — Medication 50 MG: at 07:06

## 2022-06-14 RX ADMIN — HYDROMORPHONE HYDROCHLORIDE 0.4 MG: 2 INJECTION INTRAMUSCULAR; INTRAVENOUS; SUBCUTANEOUS at 10:06

## 2022-06-14 RX ADMIN — MUPIROCIN: 20 OINTMENT TOPICAL at 05:06

## 2022-06-14 RX ADMIN — DIPHENHYDRAMINE HYDROCHLORIDE 12.5 MG: 50 INJECTION, SOLUTION INTRAMUSCULAR; INTRAVENOUS at 07:06

## 2022-06-14 RX ADMIN — MUPIROCIN: 20 OINTMENT TOPICAL at 09:06

## 2022-06-14 RX ADMIN — OXYCODONE HYDROCHLORIDE 5 MG: 5 TABLET ORAL at 11:06

## 2022-06-14 RX ADMIN — PHENYLEPHRINE HYDROCHLORIDE 0.3 MCG/KG/MIN: 10 INJECTION INTRAVENOUS at 07:06

## 2022-06-14 RX ADMIN — LIDOCAINE HYDROCHLORIDE 100 MG: 20 INJECTION, SOLUTION INTRAVENOUS at 07:06

## 2022-06-14 RX ADMIN — MIDAZOLAM HYDROCHLORIDE 2 MG: 1 INJECTION, SOLUTION INTRAMUSCULAR; INTRAVENOUS at 06:06

## 2022-06-14 RX ADMIN — ACETAMINOPHEN 1000 MG: 10 INJECTION INTRAVENOUS at 02:06

## 2022-06-14 RX ADMIN — INDOCYANINE GREEN 12.5 MG: KIT INTRAVENOUS at 09:06

## 2022-06-14 RX ADMIN — HEPARIN SODIUM 5000 UNITS: 5000 INJECTION INTRAVENOUS; SUBCUTANEOUS at 05:06

## 2022-06-14 RX ADMIN — BUPIVACAINE 20 ML: 13.3 INJECTION, SUSPENSION, LIPOSOMAL INFILTRATION at 06:06

## 2022-06-14 RX ADMIN — HYDROMORPHONE HYDROCHLORIDE 0.4 MG: 2 INJECTION INTRAMUSCULAR; INTRAVENOUS; SUBCUTANEOUS at 11:06

## 2022-06-14 RX ADMIN — IBUPROFEN 800 MG: 800 INJECTION INTRAVENOUS at 10:06

## 2022-06-14 RX ADMIN — ROCURONIUM BROMIDE 20 MG: 10 SOLUTION INTRAVENOUS at 09:06

## 2022-06-14 RX ADMIN — BUPIVACAINE HYDROCHLORIDE 40 ML: 2.5 INJECTION, SOLUTION EPIDURAL; INFILTRATION; INTRACAUDAL; PERINEURAL at 06:06

## 2022-06-14 RX ADMIN — IBUPROFEN 800 MG: 800 INJECTION INTRAVENOUS at 03:06

## 2022-06-14 RX ADMIN — PHENYLEPHRINE HYDROCHLORIDE 200 MCG: 10 INJECTION INTRAVENOUS at 07:06

## 2022-06-14 RX ADMIN — ACETAMINOPHEN 1000 MG: 500 TABLET ORAL at 06:06

## 2022-06-14 RX ADMIN — OXYCODONE 5 MG: 5 TABLET ORAL at 08:06

## 2022-06-14 RX ADMIN — PHENYLEPHRINE HYDROCHLORIDE 100 MCG: 10 INJECTION INTRAVENOUS at 07:06

## 2022-06-14 RX ADMIN — ALBUMIN (HUMAN): 12.5 SOLUTION INTRAVENOUS at 07:06

## 2022-06-14 RX ADMIN — ACETAMINOPHEN 1000 MG: 10 INJECTION INTRAVENOUS at 09:06

## 2022-06-14 RX ADMIN — ONDANSETRON HYDROCHLORIDE 4 MG: 2 INJECTION INTRAMUSCULAR; INTRAVENOUS at 07:06

## 2022-06-14 RX ADMIN — FENTANYL CITRATE 100 MCG: 50 INJECTION, SOLUTION INTRAMUSCULAR; INTRAVENOUS at 07:06

## 2022-06-14 RX ADMIN — SODIUM CHLORIDE, SODIUM LACTATE, POTASSIUM CHLORIDE, AND CALCIUM CHLORIDE: 600; 310; 30; 20 INJECTION, SOLUTION INTRAVENOUS at 06:06

## 2022-06-14 RX ADMIN — SCOPOLAMINE 1 PATCH: 1.5 PATCH, EXTENDED RELEASE TRANSDERMAL at 05:06

## 2022-06-14 RX ADMIN — ROCURONIUM BROMIDE 20 MG: 10 SOLUTION INTRAVENOUS at 08:06

## 2022-06-14 RX ADMIN — SUGAMMADEX 200 MG: 100 INJECTION, SOLUTION INTRAVENOUS at 10:06

## 2022-06-14 RX ADMIN — GABAPENTIN 300 MG: 300 CAPSULE ORAL at 09:06

## 2022-06-14 RX ADMIN — PROPOFOL 150 MG: 10 INJECTION, EMULSION INTRAVENOUS at 07:06

## 2022-06-14 RX ADMIN — PREGABALIN 50 MG: 50 CAPSULE ORAL at 06:06

## 2022-06-14 RX ADMIN — SODIUM CHLORIDE, SODIUM LACTATE, POTASSIUM CHLORIDE, AND CALCIUM CHLORIDE: 600; 310; 30; 20 INJECTION, SOLUTION INTRAVENOUS at 08:06

## 2022-06-14 RX ADMIN — METRONIDAZOLE 500 MG: 500 INJECTION, SOLUTION INTRAVENOUS at 07:06

## 2022-06-14 RX ADMIN — GLYCOPYRROLATE 0.2 MG: 0.2 INJECTION, SOLUTION INTRAMUSCULAR; INTRAVITREAL at 07:06

## 2022-06-14 RX ADMIN — DEXAMETHASONE SODIUM PHOSPHATE 8 MG: 4 INJECTION, SOLUTION INTRAMUSCULAR; INTRAVENOUS at 07:06

## 2022-06-14 RX ADMIN — FAMOTIDINE 20 MG: 20 TABLET ORAL at 05:06

## 2022-06-14 NOTE — NURSING
Nurse in room to walk patient. Plan of care discuss with patient . Patient refused to walk at this time . Will try back later

## 2022-06-14 NOTE — BRIEF OP NOTE
[facility] - Surgery ([place])  Brief Operative Note    SUMMARY     Surgery Date: 6/14/2022     Surgeon(s) and Role:     * Adelina Roy MD - Primary    Assisting Surgeon: None    Pre-op Diagnosis:  Colostomy in place [Z93.3]    Post-op Diagnosis:  Post-Op Diagnosis Codes:     * Colostomy in place [Z93.3]    Procedure(s) (LRB):  ROBOTIC CLOSURE, COLOSTOMY with flexible sigmoidoscopy Lithotomy EEA stapler (N/A)    Anesthesia: General/Regional    Operative Findings: no unexpected findings.  ICG with good perfusion to rectal stump and descending colon    Estimated Blood Loss: * No values recorded between 6/14/2022  7:26 AM and 6/14/2022 10:39 AM *    Estimated Blood Loss has not been documented. EBL = 60cc.         Specimens:   Specimen (24h ago, onward)             Start     Ordered    06/14/22 1031  Specimen to Pathology, Surgery General Surgery  Once        Comments: Pre-op Diagnosis: Colostomy in place [Z93.3]Procedure(s):ROBOTIC CLOSURE, COLOSTOMY with flexible sigmoidoscopy Lithotomy EEA stapler Number of specimens: 1Name of specimens: 1. RECTUM     References:    Click here for ordering Quick Tip   Question Answer Comment   Procedure Type: General Surgery    Specimen Class: Routine/Screening    Which provider would you like to cc? ADELINA ROY    Release to patient Immediate        06/14/22 1031                [id number]

## 2022-06-14 NOTE — ANESTHESIA PROCEDURE NOTES
Bilateral TAP    Patient location during procedure: holding area   Block not for primary anesthetic.  Reason for block: at surgeon's request and post-op pain management   Post-op Pain Location: lower abdomen   Timeout: 6/14/2022 6:30 AM   End time: 6/14/2022 6:40 AM    Staffing  Authorizing Provider: Juan Antonio Iraheta MD  Performing Provider: Juan Antonio Iraheta MD    Preanesthetic Checklist  Completed: patient identified, IV checked, site marked, risks and benefits discussed, surgical consent, monitors and equipment checked, pre-op evaluation and timeout performed  Peripheral Block  Patient position: supine  Prep: ChloraPrep and site prepped and draped  Patient monitoring: heart rate and continuous pulse ox  Block type: TAP  Laterality: bilateral  Injection technique: single shot  Needle  Needle type: Echogenic   Needle gauge: 21 G  Needle length: 4 in  Needle localization: anatomical landmarks and ultrasound guidance   -ultrasound image captured on disc.  Assessment  Injection assessment: negative aspiration, negative parasthesia and local visualized surrounding nerve  Paresthesia pain: none  Heart rate change: no  Slow fractionated injection: yes  Pain Tolerance: comfortable throughout block and no complaints  Medications:    Medications: bupivacaine (pf) (MARCAINE) injection 0.25% - Perineural, Bilateral Transabdominus Plane   40 mL - 6/14/2022 6:40:00 AM    Additional Notes  Local visualized to spread between internal oblique and transversus abdominis  Each side injected with 20 cc 0.25% Marcaine plus 10 cc Exparel

## 2022-06-14 NOTE — ANESTHESIA POSTPROCEDURE EVALUATION
Anesthesia Post Evaluation    Patient: Marika Lindsey    Procedure(s) Performed: Procedure(s) (LRB):  ROBOTIC CLOSURE, COLOSTOMY with flexible sigmoidoscopy Lithotomy EEA stapler (N/A)    Final Anesthesia Type: general      Patient location during evaluation: PACU  Patient participation: Yes- Able to Participate  Level of consciousness: awake and alert  Post-procedure vital signs: reviewed and stable  Pain management: adequate  Airway patency: patent    PONV status at discharge: No PONV  Anesthetic complications: no      Cardiovascular status: blood pressure returned to baseline  Respiratory status: unassisted and spontaneous ventilation  Hydration status: euvolemic  Follow-up not needed.          Vitals Value Taken Time   /60 06/14/22 1155   Temp 36.2 °C (97.1 °F) 06/14/22 1036   Pulse 73 06/14/22 1157   Resp 18 06/14/22 1155   SpO2 99 % 06/14/22 1157   Vitals shown include unvalidated device data.      No case tracking events are documented in the log.      Pain/Celio Score: Pain Rating Prior to Med Admin: 7 (6/14/2022 11:53 AM)  Pain Rating Post Med Admin: 5 (6/14/2022 11:06 AM)  Celio Score: 9 (6/14/2022 11:06 AM)

## 2022-06-14 NOTE — ANESTHESIA PROCEDURE NOTES
Intubation    Date/Time: 6/14/2022 7:11 AM  Performed by: Fouzia Feliz CRNA  Authorized by: Parmjit Gutierrez MD     Intubation:     Induction:  Intravenous    Intubated:  Postinduction    Mask Ventilation:  Easy mask    Attempts:  1    Attempted By:  CRNA    Method of Intubation:  Video laryngoscopy    Blade:  Mckeon 3    Laryngeal View Grade: Grade I - full view of cords      Difficult Airway Encountered?: No      Complications:  None    Airway Device:  Oral endotracheal tube    Airway Device Size:  7.0    Tube secured:  21    Secured at:  The lips    Placement Verified By:  Capnometry    Complicating Factors:  None    Findings Post-Intubation:  BS equal bilateral and atraumatic/condition of teeth unchanged

## 2022-06-14 NOTE — TRANSFER OF CARE
Anesthesia Transfer of Care Note    Patient: Marika Lindsey    Procedure(s) Performed: Procedure(s) (LRB):  ROBOTIC CLOSURE, COLOSTOMY with flexible sigmoidoscopy Lithotomy EEA stapler (N/A)    Patient location: PACU    Anesthesia Type: general    Transport from OR: Transported from OR on 2-3 L/min O2 by NC with adequate spontaneous ventilation    Post pain: adequate analgesia    Post assessment: no apparent anesthetic complications    Post vital signs: stable    Level of consciousness: awake and alert    Nausea/Vomiting: no nausea/vomiting    Complications: none    Transfer of care protocol was followed      Last vitals:   Visit Vitals  /75 (BP Location: Right arm, Patient Position: Sitting)   Pulse 83   Temp 37 °C (98.6 °F) (Oral)   Resp 18   Wt 111.1 kg (245 lb)   SpO2 98%   Breastfeeding No   BMI 42.05 kg/m²

## 2022-06-14 NOTE — NURSING
Patient transfer to med surg floor via transportation . AAOX4  Able to make needs known . No c/o pain or discomfort noted . PEERLA ,  respiration even and unlabored . Skin assessment performed with kalyani baum . Incision site to abdomen 4 lap site with dermabond noted . Midline I(adbomen) incision x2  covered with bandage, small amount of drainage noted . Iv to right hand intact and patient flushed well  Worthy cath in place draining clear yellow urine.. Plan of care discuss with patient  , patient verbalizes understanding . Patient oriented to room and call bell . SCD to ble  on and working. Safety maintain call bell in reach , bed in lowest position , bed alarm on and working ,

## 2022-06-14 NOTE — INTERVAL H&P NOTE
The patient has been examined and the H&P has been reviewed:    I concur with the findings and no changes have occurred since H&P was written.    Surgery risks, benefits and alternative options discussed and understood by patient/family.    Adelina Garces MD  Staff Surgeon   Colon & Rectal Surgery

## 2022-06-14 NOTE — PLAN OF CARE
"Patient AAOX4 able to make needs known . No c/o pain or discomfort noted .incision to abdomen dry and intact . Plan of care discuss with patient . Patient verbalizes understanding . Nurse informed patient that she has to ambulate when she is alert . Patient verbalizes understanding but states " she will walk once she is up she still feels a little drowsy ". Safety maintain bed in lowest position , call bell within reach . Bed alarm on and working .   Problem: Adult Inpatient Plan of Care  Goal: Plan of Care Review  Outcome: Ongoing, Progressing  Goal: Patient-Specific Goal (Individualized)  Outcome: Ongoing, Progressing  Goal: Absence of Hospital-Acquired Illness or Injury  Outcome: Ongoing, Progressing  Goal: Optimal Comfort and Wellbeing  Outcome: Ongoing, Progressing  Goal: Readiness for Transition of Care  Outcome: Ongoing, Progressing     Problem: Bariatric Environmental Safety  Goal: Safety Maintained with Care  Outcome: Ongoing, Progressing     Problem: Fall Injury Risk  Goal: Absence of Fall and Fall-Related Injury  Outcome: Ongoing, Progressing     "

## 2022-06-15 LAB
ANION GAP SERPL CALC-SCNC: 10 MMOL/L (ref 8–16)
BASOPHILS # BLD AUTO: 0.03 K/UL (ref 0–0.2)
BASOPHILS NFR BLD: 0.3 % (ref 0–1.9)
BUN SERPL-MCNC: 13 MG/DL (ref 6–20)
CALCIUM SERPL-MCNC: 8.7 MG/DL (ref 8.7–10.5)
CHLORIDE SERPL-SCNC: 108 MMOL/L (ref 95–110)
CO2 SERPL-SCNC: 22 MMOL/L (ref 23–29)
CREAT SERPL-MCNC: 0.8 MG/DL (ref 0.5–1.4)
DIFFERENTIAL METHOD: ABNORMAL
EOSINOPHIL # BLD AUTO: 0.2 K/UL (ref 0–0.5)
EOSINOPHIL NFR BLD: 1.7 % (ref 0–8)
ERYTHROCYTE [DISTWIDTH] IN BLOOD BY AUTOMATED COUNT: 15.8 % (ref 11.5–14.5)
EST. GFR  (AFRICAN AMERICAN): >60 ML/MIN/1.73 M^2
EST. GFR  (NON AFRICAN AMERICAN): >60 ML/MIN/1.73 M^2
GLUCOSE SERPL-MCNC: 112 MG/DL (ref 70–110)
HCT VFR BLD AUTO: 35.1 % (ref 37–48.5)
HGB BLD-MCNC: 11.2 G/DL (ref 12–16)
IMM GRANULOCYTES # BLD AUTO: 0.03 K/UL (ref 0–0.04)
IMM GRANULOCYTES NFR BLD AUTO: 0.3 % (ref 0–0.5)
LYMPHOCYTES # BLD AUTO: 1.9 K/UL (ref 1–4.8)
LYMPHOCYTES NFR BLD: 17.8 % (ref 18–48)
MCH RBC QN AUTO: 26.9 PG (ref 27–31)
MCHC RBC AUTO-ENTMCNC: 31.9 G/DL (ref 32–36)
MCV RBC AUTO: 84 FL (ref 82–98)
MONOCYTES # BLD AUTO: 0.9 K/UL (ref 0.3–1)
MONOCYTES NFR BLD: 8.6 % (ref 4–15)
NEUTROPHILS # BLD AUTO: 7.7 K/UL (ref 1.8–7.7)
NEUTROPHILS NFR BLD: 71.3 % (ref 38–73)
NRBC BLD-RTO: 0 /100 WBC
PLATELET # BLD AUTO: 221 K/UL (ref 150–450)
PMV BLD AUTO: 10 FL (ref 9.2–12.9)
POTASSIUM SERPL-SCNC: 4 MMOL/L (ref 3.5–5.1)
RBC # BLD AUTO: 4.16 M/UL (ref 4–5.4)
SODIUM SERPL-SCNC: 140 MMOL/L (ref 136–145)
WBC # BLD AUTO: 10.78 K/UL (ref 3.9–12.7)

## 2022-06-15 PROCEDURE — 11000001 HC ACUTE MED/SURG PRIVATE ROOM

## 2022-06-15 PROCEDURE — 25000003 PHARM REV CODE 250: Performed by: SURGERY

## 2022-06-15 PROCEDURE — 85025 COMPLETE CBC W/AUTO DIFF WBC: CPT | Performed by: SURGERY

## 2022-06-15 PROCEDURE — 94761 N-INVAS EAR/PLS OXIMETRY MLT: CPT

## 2022-06-15 PROCEDURE — 97161 PT EVAL LOW COMPLEX 20 MIN: CPT

## 2022-06-15 PROCEDURE — 63600175 PHARM REV CODE 636 W HCPCS: Performed by: SURGERY

## 2022-06-15 PROCEDURE — 80048 BASIC METABOLIC PNL TOTAL CA: CPT | Performed by: SURGERY

## 2022-06-15 PROCEDURE — 97165 OT EVAL LOW COMPLEX 30 MIN: CPT

## 2022-06-15 PROCEDURE — 36415 COLL VENOUS BLD VENIPUNCTURE: CPT | Performed by: SURGERY

## 2022-06-15 RX ADMIN — GABAPENTIN 300 MG: 300 CAPSULE ORAL at 08:06

## 2022-06-15 RX ADMIN — ACETAMINOPHEN 1000 MG: 500 TABLET ORAL at 08:06

## 2022-06-15 RX ADMIN — LEVOTHYROXINE SODIUM 50 MCG: 50 TABLET ORAL at 06:06

## 2022-06-15 RX ADMIN — IBUPROFEN 800 MG: 400 TABLET, FILM COATED ORAL at 02:06

## 2022-06-15 RX ADMIN — MUPIROCIN: 20 OINTMENT TOPICAL at 08:06

## 2022-06-15 RX ADMIN — GABAPENTIN 300 MG: 300 CAPSULE ORAL at 02:06

## 2022-06-15 RX ADMIN — IBUPROFEN 800 MG: 800 INJECTION INTRAVENOUS at 06:06

## 2022-06-15 RX ADMIN — IBUPROFEN 800 MG: 400 TABLET, FILM COATED ORAL at 08:06

## 2022-06-15 RX ADMIN — ACETAMINOPHEN 1000 MG: 10 INJECTION INTRAVENOUS at 05:06

## 2022-06-15 RX ADMIN — ACETAMINOPHEN 1000 MG: 500 TABLET ORAL at 02:06

## 2022-06-15 NOTE — CONSULTS
[facility] ([facility])  Wound Care    Patient Name:  Marika Lindsey   MRN:  [id number]  Date: 6/15/2022  Diagnosis: Colostomy in place    History:     Past Medical History:   Diagnosis Date    Dehisced gastrointestinal anastomosis     Diverticulitis     GERD (gastroesophageal reflux disease)     Thyroid disease     Wound dehiscence        Social History     Socioeconomic History    Marital status: Single   Tobacco Use    Smoking status: Former Smoker    Smokeless tobacco: Never Used   Substance and Sexual Activity    Alcohol use: Not Currently     Comment: rare       Precautions:     Allergies as of 05/13/2022    (No Known Allergies)       [facility] Assessment Details/Treatment     Wound care consulted for midline abdominal surgical wound. Patient is a 51 year old female who is now s/p colostomy closure. Patient reports that midline abdominal surgical wound has been managed by home health nurses and an outpatient wound care clinic. She states that the midline wound is slow healing and was using medihoney for the affected area. Assessment and picture listed below.     Upon assessment noted small wound with a depth of 6 cm. Cleansed the area with wound cleanser and packed with silver hydrofiber gauze. Patient tolerated dressing change well. Recommend packing of midline incisional wound 3x/week- [facility] to promote healing, manage drainage and reduce bio-burden. Nursing and MD team notified. Orders placed. Wound care to assist with pt prn.        06/15/22 1044        Incision/Site 01/18/22 0800 Abdomen midline midline   Date First Assessed/Time First Assessed: 01/18/22 0800   Present Prior to Hospital Arrival?: Yes  Location: Abdomen  Orientation: midline  Incision Type: midline  Closure Method: (c) Other (see comments)   Wound Image    Incision WDL ex   Dressing Appearance Dry;Intact;Moist drainage   Drainage Amount Small   Drainage Characteristics/Odor Serous;No odor   Appearance Pink;Red;Moist    Periwound Area Pink;Scar tissue   Wound Edges Defined;Open   Wound Length (cm) 1 cm   Wound Width (cm) 0.8 cm   Wound Depth (cm) 6 cm   Wound Volume (cm^3) 4.8 cm^3   Wound Surface Area (cm^2) 0.8 cm^2   Care Cleansed with:;Wound cleanser   Dressing Applied;Hydrofiber;Silicone;Foam   Packing hydrofiber/alginate  (Aquacel Ag)   Dressing Change Due 06/17/22         06/15/2022

## 2022-06-15 NOTE — PLAN OF CARE
No OT goals established at this time.     Problem: Occupational Therapy  Goal: Occupational Therapy Goal  Outcome: Met     Initial OT eval complete.  Sit>stand from bedside chair and ambulating short household distance bedside chair<>bathroom without AD, no LOB noted independently.  Step transfer to standard toilet without AD or grab bars and no LOB or difficulty noted with stance and controlled descent from toilet.  Donned/doffed socks seated at bedside chair MOD I via cross leg tech and handed hospital gown donning as would franchesca with supervision.  Has access to RW and TTB not in use PTA.  Recommend return home.  No acute care/post acute OT or DME needs anticipated.  To discontinue OT orders.

## 2022-06-15 NOTE — PT/OT/SLP EVAL
"Occupational Therapy   Evaluation and Discharge Note    Name: Marika Lindsey  MRN: [id number]  Admitting Diagnosis:  Colostomy in place   Recent Surgery: Procedure(s) (LRB):  ROBOTIC CLOSURE, COLOSTOMY  (N/A) 1 Day Post-Op    Recommendations:     Discharge Recommendations: home (family to assist as needed)  Discharge Equipment Recommendations:   (None anticipated.)  Barriers to discharge:   (20 steps to reach 2nd FL apt)    Assessment:   Initial OT eval complete.  Sit>stand from bedside chair and ambulating short household distance bedside chair<>bathroom without AD, no LOB noted independently.  Step transfer to standard toilet without AD or grab bars and no LOB or difficulty noted with stance and controlled descent from toilet.  Donned/doffed socks seated at bedside chair MOD I via [employer] and handed hospital gown donning as would robe with supervision.  Has access to RW and TTB not in use PTA.  Recommend return home.  No acute care/post acute OT or DME needs anticipated.  To discontinue OT orders.     Marika Lindsey is a 51 y.o. female with a medical diagnosis of Colostomy in place. At this time, patient is functioning at their prior level of function and does not require further acute OT services.     Plan:     During this hospitalization, patient does not require further acute OT services.  Please re-consult if situation changes.    · Plan of Care Reviewed with: patient    Subjective     Chief Complaint: With c/o 2/10 abdominal pain.   Patient/Family Comments/goals: No goals stated.  Reports, "I feel so much better than when the first time this was done."    Occupational Profile:  Lives alone in 2nd FL apt with 20 YVONNE and B-handrails close enough to reach simultaneously; bathroom setup as tub/shower combo and standard toilet.  Previously independent in ADL/IADL.  Reports having at RW and TTB though not in use.  Sits in about 2-inches of water in bathtub to wash then empties and refills to " rinse until clean.   Equipment Used at home:  none (has access to RW and TTB not in use PTA)  Assistance upon Discharge: Pt. Lives alone.  Will have assist from family/friends as needed.      Pain/Comfort:  · Pain Rating 1: 2/10  · Location - Side 1: Bilateral  · Location - Orientation 1: generalized  · Location 1: abdomen  · Pain Addressed 1: Distraction, Cessation of Activity, Nurse notified  · Pain Rating Post-Intervention 1: 2/10    Patients cultural, spiritual, Roman Catholic conflicts given the current situation:  (None stated.)    Objective:     Communicated with: Cathie Lainez LPN prior to session.  Patient found up in chair with peripheral IV upon OT entry to room.    General Precautions: Standard, fall (abdominal wound)   Orthopedic Precautions:N/A   Braces: N/A  Respiratory Status: Room air     Occupational Performance:    Functional Mobility/Transfers:  Sit>stand from bedside chair and ambulating short household distance bedside chair<>bathroom without AD, no LOB noted independently.  Step transfer to standard toilet without AD or grab bars and no LOB or difficulty noted with stance and controlled descent from toilet.      Activities of Daily Living:  Donned/doffed socks seated at bedside chair MOD I via Orthobond and handed hospital gown donning as would robe with supervision.     Cognitive/Visual Perceptual:  Cognitive/Psychosocial Skills:  -       Oriented to: Person, Place, Time and Situation   -       Follows Commands/attention:Follows one-step commands  -       Communication: clear/fluent  -       Memory: No Deficits noted  -       Safety awareness/insight to disability: intact   -       Mood/Affect/Coping skills/emotional control: Appropriate to situation  Visual/Perceptual:  -grossly intact    Physical Exam:  Postural examination/scapula alignment: -       No postural abnormalities identified  Skin integrity: abdominal dressing noted   Edema:  None noted  Sensation: -       Intact  light/touch  though reports numbness to abdomen  Dominant hand: -       R  Upper Extremity Range of Motion:  -       Right Upper Extremity: WFL  -       Left Upper Extremity: WFL  Upper Extremity Strength: -       Right Upper Extremity: WFL  -       Left Upper Extremity: WFL   Strength: -       Right Upper Extremity: WFL  Fine Motor Coordination: -       Intact  Left hand, finger to nose, Right hand, finger to nose, Left hand thumb/finger opposition skills and Right hand thumb/finger opposition skills    AMPAC 6 Click ADL:  AMPAC Total Score: 24    Treatment & Education:  Educated on role of OT, POC, review of call light, and importance of calling for assist as needed.   Education:    Patient left up in chair with all lines intact, call button in reach and nursing notified    GOALS:   Multidisciplinary Problems     Occupational Therapy Goals     Not on file          Multidisciplinary Problems (Resolved)        Problem: Occupational Therapy    Goal Priority Disciplines Outcome Interventions   Occupational Therapy Goal   (Resolved)     OT, PT/OT Met                    History:     Past Medical History:   Diagnosis Date    Dehisced gastrointestinal anastomosis     Diverticulitis     GERD (gastroesophageal reflux disease)     Thyroid disease     Wound dehiscence        Past Surgical History:   Procedure Laterality Date    COLON SURGERY  01/2022    sigmoid colon removed    COLONOSCOPY  2021    COLONOSCOPY  2022    COLOSTOMY      ROBOTIC CLOSURE, COLOSTOMY N/A 6/14/2022    Procedure: ROBOTIC CLOSURE, COLOSTOMY ;  Surgeon: Adelina Garces MD;  Location[facility];  Service: Colon and Rectal;  Laterality: N/A;  W/ FLEXIBLE SIGMOIDOSCOPY   LITHOTOMY EEA STAPLER       URETHRA SURGERY         Time Tracking:     OT Date of Treatment: 06/15/22  OT Start Time: 1103  OT Stop Time: 1115  OT Total Time (min): 12 min    Billable Minutes:Evaluation 12    6/15/2022

## 2022-06-15 NOTE — PLAN OF CARE
Problem: Physical Therapy  Goal: Physical Therapy Goal  Outcome: Met     PT orders received. Evaluation completed. Pt is independent for ambulation and transfers. Pt demonstrates normal gait pattern and normal balance. Pt expressed confidence in all aspects of functional mobility. No acute PT needs identified at this time. Will d/c PT. Recommend discharge to home with family help as needed.

## 2022-06-15 NOTE — PT/OT/SLP EVAL
Physical Therapy Evaluation and Discharge Note    Patient Name:  Marika Lindsey   MRN:  [id number]    Recommendations:     Discharge Recommendations:  home (with family assistance as needed)   Discharge Equipment Recommendations: none   Barriers to discharge: None    Assessment:     Marika Lindsey is a 51 y.o. female admitted s/p colostomy closure. She has a past medical history that includes but is not limited to colostomy formation and ureteral reconstruction.  At this time, patient is functioning at their prior level of function and does not require further acute PT services.     PT orders received. Evaluation completed. Pt is independent for ambulation and transfers. Pt demonstrates normal gait pattern and normal balance. Pt expressed confidence in all aspects of functional mobility. No acute PT needs identified at this time. Will d/c PT. Recommend discharge to home with family help as needed.    Recent Surgery: Procedure(s) (LRB):  ROBOTIC CLOSURE, COLOSTOMY  (N/A) 1 Day Post-Op    Plan:     During this hospitalization, patient does not require further acute PT services.  Please re-consult if situation changes.      Subjective     Chief Complaint: none at this time  Patient/Family Comments/goals: Pt noted she only feels discomfort when up moving around but is eager to return to work as a .   Pain/Comfort:  · Pain Rating 1: 4/10 (with mobility)  · Location - Side 1: Bilateral  · Location - Orientation 1: generalized  · Location 1: abdomen  · Pain Addressed 1: Pre-medicate for activity, Reposition, Cessation of Activity, Distraction    Patients cultural, spiritual, Amish conflicts given the current situation: no    Living Environment:  Pt lives alone in a second-story apartment with 20 YVONNE (bilateral handrails available). She has access to a tub-shower combination in the home. Prior to admission, patients level of function was independent and ambulatory.  Equipment used at home:  none.  DME owned (not currently used): rolling walker.  Upon discharge, patient will have assistance from brother and friends.    Objective:     Communicated with FABIAN Brand prior to session.  Patient found up in chair with peripheral IV upon PT entry to room.    General Precautions: Standard, fall   Orthopedic Precautions:N/A   Braces: N/A   Respiratory Status: Room air    Exams:  · Cognition:   · Patient is oriented to person, place, time, and situation.  · Pt follows approximately 100% of multiple-step commands.    · Mood: Pleasant and cooperative.  · Musculoskeletal:  · Posture:    · In sitting: WNL  · In standing: WNL  · LE ROM/Strength:   · R ROM: No deficits  · L ROM: No deficits  · R Strength:   · WFL  · L Strength:   · WFL  · Neuromuscular:  · Sensation: Intact to light touch bilateral LEs.   · Tone/Reflexes: No impairments identified with functional mobility. No formal testing performed.  · Balance:   · Static sitting: Independent  · Dynamic sitting: Independent  · Static standing: Independent  · Dynamic standing: Supervision transitioning to Independent  · Visual-vestibular: No impairments identified with functional mobility. No formal testing performed.  · Integument: Visible skin intact    Functional Mobility:  · Transfers:     · Sit to Stand:  supervision with no AD  · Gait: x>500 feet with speed, renee, and bilateral step length within functional limits. Pt with no stability deficits or LOB noted. Pt initially requiring supervision, however, transitioned to independence with ambulation     Therapeutic Activities and Exercises:  Transfer and gait training as described above.    PT educated patient re:   · Discharge from PT  · Safety with OOB mobility  · Activity recommendations  · Discharge recommendations   Pt verbalized understanding     AM-PAC 6 CLICK MOBILITY  Total Score:20     Patient left up in chair with all lines intact and call button in reach.    GOALS:   Multidisciplinary Problems      Physical Therapy Goals     Not on file          Multidisciplinary Problems (Resolved)        Problem: Physical Therapy    Goal Priority Disciplines Outcome Goal Variances Interventions   Physical Therapy Goal   (Resolved)     PT, PT/OT Met                     History:     Past Medical History:   Diagnosis Date    Dehisced gastrointestinal anastomosis     Diverticulitis     GERD (gastroesophageal reflux disease)     Thyroid disease     Wound dehiscence        Past Surgical History:   Procedure Laterality Date    COLON SURGERY  01/2022    sigmoid colon removed    COLONOSCOPY  2021    COLONOSCOPY  2022    COLOSTOMY      ROBOTIC CLOSURE, COLOSTOMY N/A 6/14/2022    Procedure: ROBOTIC CLOSURE, COLOSTOMY ;  Surgeon: Adelina Garces MD;  Location: [facility];  Service: Colon and Rectal;  Laterality: N/A;  W/ FLEXIBLE SIGMOIDOSCOPY   LITHOTOMY EEA STAPLER       URETHRA SURGERY         Time Tracking:     PT Received On: 06/15/22  PT Start Time: 0840     PT Stop Time: 0851  PT Total Time (min): 11 min     Billable Minutes: Evaluation 11      06/15/2022

## 2022-06-15 NOTE — PLAN OF CARE
Patient is AAOx4. Incisions to abdomen, CDI. Patient reported pain during the night and medications were administered per orders. Ambulated in the halls. VSS. No acute events overnight. Patient resting comfortably at present. Bed locked in lowest position with side rails up x 2. Call light within reach of patient. Will continue purposeful rounding.

## 2022-06-15 NOTE — PLAN OF CARE
Patient AA0X4 able to make needs known . C/o pain prn pain medication administer patient tolerated well . Dressing to abdomen change by wound care nurse . Stoma dressing will be changed by Dr herman tomorrow.small amount of drainage noted to bandage Patient ambulate to bathroom and down santos . Tolerate meals and medication well  . Plan of care discuss with patient  safety maintain call bell in reach bed in lowest position bed alarm on and working .Problem: Adult Inpatient Plan of Care  Goal: Plan of Care Review  Outcome: Ongoing, Progressing  Goal: Patient-Specific Goal (Individualized)  Outcome: Ongoing, Progressing  Goal: Absence of Hospital-Acquired Illness or Injury  Outcome: Ongoing, Progressing  Goal: Optimal Comfort and Wellbeing  Outcome: Ongoing, Progressing  Goal: Readiness for Transition of Care  Outcome: Ongoing, Progressing     Problem: Bariatric Environmental Safety  Goal: Safety Maintained with Care  Outcome: Ongoing, Progressing     Problem: Fall Injury Risk  Goal: Absence of Fall and Fall-Related Injury  Outcome: Ongoing, Progressing

## 2022-06-15 NOTE — PROGRESS NOTES
Surgery Inpatient Progress Note    Date: 06/15/2022    Overnight events: Passing flatus.  L shoulder pain.     O:   Vitals:    06/15/22 0742   BP: (!) 118/58   Pulse: 74   Resp: 20   Temp: 97.8 °F (36.6 °C)       Physical Exam   Gen: well developed female, NAD   HEENT: normocephalic, atraumatic, PERRL, EOMI   CV: RRR, no murmurs  Resp: nonlabored, CTAB  Abd: soft, appropriately tender, incisions well approximated, ostomy site with telfa wick in place, midline incision with inferior aspect packed.   MSK: no gross deformities, no cyanosis or edema     Labs:   Reviewed     Assessment and plan:   Marika Lindsey is a 51 y.o. female who is POD 1 s/p robotic colostomy closure     1. Colostomy closure  - diet as tolerated  - pain control   - telfa wick removed tomorrow     2. Midline wound   - patient with pre-existing midline wound from prior operations  - pack with nugauze daily      Adelina Garces MD  Staff Surgeon   Colon & Rectal Surgery

## 2022-06-16 VITALS
WEIGHT: 243.63 LBS | SYSTOLIC BLOOD PRESSURE: 118 MMHG | DIASTOLIC BLOOD PRESSURE: 64 MMHG | HEIGHT: 64 IN | OXYGEN SATURATION: 97 % | RESPIRATION RATE: 17 BRPM | TEMPERATURE: 98 F | HEART RATE: 74 BPM | BODY MASS INDEX: 41.59 KG/M2

## 2022-06-16 PROCEDURE — 25000003 PHARM REV CODE 250: Performed by: SURGERY

## 2022-06-16 PROCEDURE — 94761 N-INVAS EAR/PLS OXIMETRY MLT: CPT

## 2022-06-16 RX ORDER — ENOXAPARIN SODIUM 100 MG/ML
40 INJECTION SUBCUTANEOUS EVERY 24 HOURS
Status: DISCONTINUED | OUTPATIENT
Start: 2022-06-16 | End: 2022-06-16 | Stop reason: HOSPADM

## 2022-06-16 RX ORDER — OXYCODONE HYDROCHLORIDE 5 MG/1
5 TABLET ORAL EVERY 4 HOURS PRN
Qty: 30 TABLET | Refills: 0 | Status: SHIPPED | OUTPATIENT
Start: 2022-06-16 | End: 2022-12-30

## 2022-06-16 RX ADMIN — ACETAMINOPHEN 1000 MG: 500 TABLET ORAL at 01:06

## 2022-06-16 RX ADMIN — LEVOTHYROXINE SODIUM 50 MCG: 50 TABLET ORAL at 05:06

## 2022-06-16 RX ADMIN — ACETAMINOPHEN 1000 MG: 500 TABLET ORAL at 05:06

## 2022-06-16 RX ADMIN — IBUPROFEN 800 MG: 400 TABLET, FILM COATED ORAL at 01:06

## 2022-06-16 RX ADMIN — IBUPROFEN 800 MG: 400 TABLET, FILM COATED ORAL at 05:06

## 2022-06-16 RX ADMIN — GABAPENTIN 300 MG: 300 CAPSULE ORAL at 08:06

## 2022-06-16 RX ADMIN — MUPIROCIN: 20 OINTMENT TOPICAL at 09:06

## 2022-06-16 NOTE — PLAN OF CARE
06/16/22 1046   Final Note   Assessment Type Final Discharge Note   Anticipated Discharge Disposition  [facility] [place]   Post-Acute Status   Post-Acute Authorization Home Health   Home Health Status Referrals Sent   Discharge Delays None known at this time     Discharge information sent to [facility], patient to resume home health services.

## 2022-06-16 NOTE — PLAN OF CARE
Patient AAOX4 able to make needs known .  No distress noted No c/o pain or discomfort noted . Patient ambulate down santos and to bathroom . Patient scheduled to be discharged today at a later time awaiting on transportation. Plan of care and discharge instruction discuss with patient . Patient verbalizes understanding .  Problem: Adult Inpatient Plan of Care  Goal: Plan of Care Review  Outcome: Met  Goal: Patient-Specific Goal (Individualized)  Outcome: Met  Goal: Absence of Hospital-Acquired Illness or Injury  Outcome: Met  Goal: Optimal Comfort and Wellbeing  Outcome: Met  Goal: Readiness for Transition of Care  Outcome: Met     Problem: Bariatric Environmental Safety  Goal: Safety Maintained with Care  Outcome: Met     Problem: Fall Injury Risk  Goal: Absence of Fall and Fall-Related Injury  Outcome: Met

## 2022-06-16 NOTE — DISCHARGE SUMMARY
[facility]  Colorectal Surgery  Discharge Summary      Patient Name: Marika Lindsey  MRN: [id number]  Admission Date: 6/14/2022  Hospital Length of Stay: 2 days  Discharge Date and Time:  06/16/2022 9:38 AM  Attending Physician: Adelina Garces MD   Discharging Provider: Adelina Garces MD  Primary Care Provider: CHELSEA French     HPI:  No notes on file    Procedure(s) (LRB):  ROBOTIC CLOSURE, COLOSTOMY  (N/A)     Hospital Course:  Ms. Lindsey presented for elective colostomy closure.  Post-operatively, she was admitted to the floor for recovery.  She was able to have return of bowel function, tolerate a regular diet, and her pain was controlled.  She was evaluated by wound care for her pre-existing midline wound and dressing orders were placed.  Her vital catheter was removed and she was able to void spontaneously.  She was deemed appropriate for discharge.           Significant Diagnostic Studies: none    Pending Diagnostic Studies:     Procedure Component Value Units Date/Time    Specimen to Pathology, Surgery General Surgery [[id number]] Collected: 06/14/22 1031    Order Status: Sent Lab Status: In process Updated: 06/14/22 1101    Specimen: Tissue         Final Active Diagnoses:    Diagnosis Date Noted POA    PRINCIPAL PROBLEM:  Colostomy in place [Z93.3] 06/06/2022 Not Applicable      Problems Resolved During this Admission:      Discharged Condition: good    Disposition: Home-Health Care [facility] [place] [place]    Follow Up:   Follow-up Information     [facility] Follow up.    Specialty: Home Health Services  Contact information:  [address] [address]  [phone]             Adelina Garces MD Follow up.    Specialty: Colon and Rectal Surgery  Why: Call [phone] to schedule follow up or schedule via [facility] for 2 weeks post-discharge.  Okay to be seen at [facility] or [address].  Contact information:  [address]  [place] [address]  [phone]                        Patient Instructions:     1. Take tylenol and ibuprofen for pain.  Take pain medication for breakthrough only.    2. OKay to shower, let warm soapy water run over incisions and pat dry.  Continue packing midline wound per home health/wound care.  Do not need to pack ostomy site, cover with dry dressing daily.   3. Call [phone] for worsening pain, redness or drainage from wounds, fever > 101.  Call to schedule follow up for 2 weeks post op or [facility]       Medications:  Reconciled Home Medications:      Medication List      START taking these medications    oxyCODONE 5 MG immediate release tablet  Commonly known as: ROXICODONE  Take 1 tablet (5 mg total) by mouth every 4 (four) hours as needed for Pain.        CONTINUE taking these medications    acetaminophen 500 MG tablet  Commonly known as: TYLENOL  Take 500 mg by mouth every 6 (six) hours as needed for Pain.     ascorbic acid (vitamin C) 500 MG tablet  Commonly known as: VITAMIN C  Take 500 mg by mouth once daily.     biotin 1 mg Cap  Take 1 capsule by mouth.     levothyroxine 50 MCG tablet  Commonly known as: SYNTHROID  Take 50 mcg by mouth before breakfast.     multivitamin capsule  Take 1 capsule by mouth once daily.     vitamin D 1000 units Tab  Commonly known as: VITAMIN D3  Take 5,000 Units by mouth once daily.        STOP taking these medications    metroNIDAZOLE 500 MG tablet  Commonly known as: FLAGYL     neomycin 500 mg Tab  Commonly known as: MYCIFRADIN            Adelina Garces MD  Colorectal Surgery  [employer] [facility] Med [facility]urg ([facility]

## 2022-06-16 NOTE — DISCHARGE INSTRUCTIONS
Take tylenol and ibuprofen for pain.  Take pain medication for breakthrough only.    OKay to shower, let warm soapy water run over incisions and pat dry.  Continue packing midline wound per home health/wound care.  Do not need to pack ostomy site, cover with dry dressing daily.   Call [phone] for worsening pain, redness or drainage from wounds, fever > 101.  Call to schedule follow up for 2 weeks post op or [facility]

## 2022-06-16 NOTE — PLAN OF CARE
AAOX4. VSS. Abdominal incision CDI with mepilex foam dressing, minimal tenderness noted. Patient encouraged to turn, cough, deep breathe, and use IS. Able to make needs known. Ambulating well with no assistance noted. Tolerating low fiber/residue diet and medications well. HOB 30-45 degrees with call bell and bedside table within reach, bed in lowest position with hourly purposeful rounding. No distress noted, will continue to monitor.   Problem: Adult Inpatient Plan of Care  Goal: Plan of Care Review  Outcome: Ongoing, Progressing  Goal: Patient-Specific Goal (Individualized)  Outcome: Ongoing, Progressing  Goal: Absence of Hospital-Acquired Illness or Injury  Outcome: Ongoing, Progressing  Goal: Optimal Comfort and Wellbeing  Outcome: Ongoing, Progressing  Goal: Readiness for Transition of Care  Outcome: Ongoing, Progressing     Problem: Bariatric Environmental Safety  Goal: Safety Maintained with Care  Outcome: Ongoing, Progressing     Problem: Fall Injury Risk  Goal: Absence of Fall and Fall-Related Injury  Outcome: Ongoing, Progressing

## 2022-06-16 NOTE — HOSPITAL COURSE
Ms. Lindsey presented for elective colostomy closure.  Post-operatively, she was admitted to the floor for recovery.  She was able to have return of bowel function, tolerate a regular diet, and her pain was controlled.  She was evaluated by wound care for her pre-existing midline wound and dressing orders were placed.  Her vital catheter was removed and she was able to void spontaneously.  She was deemed appropriate for discharge.

## 2022-06-16 NOTE — PROGRESS NOTES
Surgery Inpatient Progress Note    Date: 06/16/2022    Overnight events: Passing flatus, no bowel movement.  Doing well with wound care.     O:   Vitals:    06/16/22 0709   BP: (!) 116/59   Pulse: 74   Resp: 18   Temp: 98.2 °F (36.8 °C)       Physical Exam   Gen: well developed female, NAD  HEENT: normocephalic, atraumatic, PERRL, EOMI   CV: RRR, no murmurs  Resp: nonlabored, CTAB   Abd: soft, ostomy site with telfa wick in place, midline wound clean and dry, incisions well approximated with dermabond   MSK: no gross deformities, no cyanosis or edema     Assessment and plan:   Marika Lindsey is a 51 y.o. female who is POD 2 s/p robotic colostomy closure     - regular diet   - out of bed, ambulate  - pain control  - wound care following patient's pre-existing midline wound.  Appreciate recommendations.       Adelina Garces MD  Staff Surgeon   Colon & Rectal Surgery

## 2022-06-17 ENCOUNTER — TELEPHONE (OUTPATIENT)
Dept: SURGERY | Facility: CLINIC | Age: 52
End: 2022-06-17
Payer: MEDICAID

## 2022-06-17 LAB
FINAL PATHOLOGIC DIAGNOSIS: NORMAL
GROSS: NORMAL
Lab: NORMAL

## 2022-06-17 NOTE — TELEPHONE ENCOUNTER
"Spoke with patient to schedule f/u appointment for Colectomy reversal. Pt agreed and confirmed 0920 time on 7/1. Pt stated, "she is doing well following surgery, no fevers." Informed to call with worsening pain or fever >101F.   "

## 2022-06-21 ENCOUNTER — TELEPHONE (OUTPATIENT)
Dept: SURGERY | Facility: CLINIC | Age: 52
End: 2022-06-21
Payer: MEDICAID

## 2022-06-21 NOTE — TELEPHONE ENCOUNTER
"Spoke with patient regarding post op pain from robotic colectomy reversal. Pt states, "she has been having sharp pain with movement that just started. She has been taking ibuprofen and tylenol for pain management and oxycodone only once. She feels like the pain is getting worse." Offered to be seen sooner in clinic but pt states, "she may have trouble with appointment because her insurance will not pay for HH visit and clinic visit."  Will reach out to Dr. Garces to discuss POC.       ----- Message from Nesha Nassar sent at 6/21/2022 10:18 AM CDT -----  Regarding: Post op pain  Contact: [phone]  Calling to speak with nurse regarding post op pain. Patient needs to reschedule appointment due to home health coming on 7/1, insurance won't cover home health and visit. Please call to discuss.      "

## 2022-06-23 ENCOUNTER — PATIENT MESSAGE (OUTPATIENT)
Dept: SURGERY | Facility: OTHER | Age: 52
End: 2022-06-23
Payer: MEDICAID

## 2022-06-23 ENCOUNTER — TELEPHONE (OUTPATIENT)
Dept: SURGERY | Facility: CLINIC | Age: 52
End: 2022-06-23
Payer: MEDICAID

## 2022-06-23 NOTE — TELEPHONE ENCOUNTER
"Spoke with patient regarding PO pain. Pt stated dehiscence at surgical site per Home Health nurse. Pt stated, "she had not removed bandage to look at site but was having pain with movement." Denies pain today. Denies fever. No green, white, or yellow drainage noted.  nurse recommended her being seen sooner than 7/1 appointment.     ----- Message from Tee Garcia sent at 6/23/2022 11:18 AM CDT -----  Contact: @[phone]  Patient calling about being seen due to pain at the surgery site and the wound bi hist, pls advise       "

## 2022-06-24 ENCOUNTER — OFFICE VISIT (OUTPATIENT)
Dept: SURGERY | Facility: CLINIC | Age: 52
End: 2022-06-24
Payer: MEDICAID

## 2022-06-24 VITALS
BODY MASS INDEX: 41.52 KG/M2 | SYSTOLIC BLOOD PRESSURE: 127 MMHG | DIASTOLIC BLOOD PRESSURE: 71 MMHG | WEIGHT: 243.19 LBS | HEART RATE: 93 BPM | HEIGHT: 64 IN

## 2022-06-24 DIAGNOSIS — Z93.3 COLOSTOMY IN PLACE: Primary | ICD-10-CM

## 2022-06-24 PROCEDURE — 99213 OFFICE O/P EST LOW 20 MIN: CPT | Mod: PBBFAC | Performed by: SURGERY

## 2022-06-24 PROCEDURE — 99999 PR PBB SHADOW E&M-EST. PATIENT-LVL III: CPT | Mod: PBBFAC,,, | Performed by: SURGERY

## 2022-06-24 PROCEDURE — 3074F SYST BP LT 130 MM HG: CPT | Mod: CPTII,,, | Performed by: SURGERY

## 2022-06-24 PROCEDURE — 3008F PR BODY MASS INDEX (BMI) DOCUMENTED: ICD-10-PCS | Mod: CPTII,,, | Performed by: SURGERY

## 2022-06-24 PROCEDURE — 99024 POSTOP FOLLOW-UP VISIT: CPT | Mod: ,,, | Performed by: SURGERY

## 2022-06-24 PROCEDURE — 3078F DIAST BP <80 MM HG: CPT | Mod: CPTII,,, | Performed by: SURGERY

## 2022-06-24 PROCEDURE — 99024 PR POST-OP FOLLOW-UP VISIT: ICD-10-PCS | Mod: ,,, | Performed by: SURGERY

## 2022-06-24 PROCEDURE — 1159F PR MEDICATION LIST DOCUMENTED IN MEDICAL RECORD: ICD-10-PCS | Mod: CPTII,,, | Performed by: SURGERY

## 2022-06-24 PROCEDURE — 3078F PR MOST RECENT DIASTOLIC BLOOD PRESSURE < 80 MM HG: ICD-10-PCS | Mod: CPTII,,, | Performed by: SURGERY

## 2022-06-24 PROCEDURE — 3074F PR MOST RECENT SYSTOLIC BLOOD PRESSURE < 130 MM HG: ICD-10-PCS | Mod: CPTII,,, | Performed by: SURGERY

## 2022-06-24 PROCEDURE — 1159F MED LIST DOCD IN RCRD: CPT | Mod: CPTII,,, | Performed by: SURGERY

## 2022-06-24 PROCEDURE — 3008F BODY MASS INDEX DOCD: CPT | Mod: CPTII,,, | Performed by: SURGERY

## 2022-06-24 PROCEDURE — 99999 PR PBB SHADOW E&M-EST. PATIENT-LVL III: ICD-10-PCS | Mod: PBBFAC,,, | Performed by: SURGERY

## 2022-06-24 NOTE — PROGRESS NOTES
Colon & Rectal Surgery Clinic Follow Up    HPI:   Marika Lindsey is a 51 y.o. female who presents for follow up after robotic colostomy closure.  Concern for wound dehiscence at stoma site.  Had worsening pain for 2 days that has since resolved.  Tolerating a diet, having bowel function.       Objective:   Vitals:    06/24/22 1358   BP: 127/71   Pulse: 93        Physical Exam   Gen: well developed female, NAD  HEENT: normocephalic, atraumatic, PERRL, EOMI   CV: RRR, no murmurs  Res: nonlabored, CTAB   Abd: soft, midline wound healing well, ostomy site with granulation tissue and fibrinous exudate  MSK: no gross deformities, no cyanosis or edema    Assessment and Plan:   Marika Lindsey  is a 51 y.o. female who presents for follow up of ostomy site closure    - wound well appearing without evidence of infection  - patient overall following expected postop course  - follow up in 2-3 weeks.       Adelina Garces MD  Staff Surgeon   Colon & Rectal Surgery

## 2022-06-29 ENCOUNTER — PATIENT MESSAGE (OUTPATIENT)
Dept: SURGERY | Facility: CLINIC | Age: 52
End: 2022-06-29
Payer: MEDICAID

## 2022-06-29 ENCOUNTER — PATIENT MESSAGE (OUTPATIENT)
Dept: SURGERY | Facility: OTHER | Age: 52
End: 2022-06-29
Payer: MEDICAID

## 2022-06-29 ENCOUNTER — LAB VISIT (OUTPATIENT)
Dept: LAB | Facility: OTHER | Age: 52
End: 2022-06-29
Attending: NURSE PRACTITIONER
Payer: MEDICAID

## 2022-06-29 DIAGNOSIS — R61 NIGHT SWEATS: ICD-10-CM

## 2022-06-29 DIAGNOSIS — R61 NIGHT SWEATS: Primary | ICD-10-CM

## 2022-06-29 LAB
ANION GAP SERPL CALC-SCNC: 16 MMOL/L (ref 8–16)
BASOPHILS # BLD AUTO: 0.07 K/UL (ref 0–0.2)
BASOPHILS NFR BLD: 0.5 % (ref 0–1.9)
BUN SERPL-MCNC: 27 MG/DL (ref 6–20)
CALCIUM SERPL-MCNC: 10.7 MG/DL (ref 8.7–10.5)
CHLORIDE SERPL-SCNC: 103 MMOL/L (ref 95–110)
CO2 SERPL-SCNC: 22 MMOL/L (ref 23–29)
CREAT SERPL-MCNC: 1.2 MG/DL (ref 0.5–1.4)
DIFFERENTIAL METHOD: ABNORMAL
EOSINOPHIL # BLD AUTO: 0.6 K/UL (ref 0–0.5)
EOSINOPHIL NFR BLD: 4.6 % (ref 0–8)
ERYTHROCYTE [DISTWIDTH] IN BLOOD BY AUTOMATED COUNT: 14.9 % (ref 11.5–14.5)
EST. GFR  (AFRICAN AMERICAN): >60 ML/MIN/1.73 M^2
EST. GFR  (NON AFRICAN AMERICAN): 52 ML/MIN/1.73 M^2
GLUCOSE SERPL-MCNC: 99 MG/DL (ref 70–110)
HCT VFR BLD AUTO: 35.4 % (ref 37–48.5)
HGB BLD-MCNC: 11.4 G/DL (ref 12–16)
IMM GRANULOCYTES # BLD AUTO: 0.06 K/UL (ref 0–0.04)
IMM GRANULOCYTES NFR BLD AUTO: 0.5 % (ref 0–0.5)
LYMPHOCYTES # BLD AUTO: 2.2 K/UL (ref 1–4.8)
LYMPHOCYTES NFR BLD: 17.1 % (ref 18–48)
MCH RBC QN AUTO: 26.6 PG (ref 27–31)
MCHC RBC AUTO-ENTMCNC: 32.2 G/DL (ref 32–36)
MCV RBC AUTO: 83 FL (ref 82–98)
MONOCYTES # BLD AUTO: 1.1 K/UL (ref 0.3–1)
MONOCYTES NFR BLD: 8.6 % (ref 4–15)
NEUTROPHILS # BLD AUTO: 8.9 K/UL (ref 1.8–7.7)
NEUTROPHILS NFR BLD: 68.7 % (ref 38–73)
NRBC BLD-RTO: 0 /100 WBC
PLATELET # BLD AUTO: 482 K/UL (ref 150–450)
PMV BLD AUTO: 9.8 FL (ref 9.2–12.9)
POTASSIUM SERPL-SCNC: 4.1 MMOL/L (ref 3.5–5.1)
RBC # BLD AUTO: 4.29 M/UL (ref 4–5.4)
SODIUM SERPL-SCNC: 141 MMOL/L (ref 136–145)
WBC # BLD AUTO: 12.93 K/UL (ref 3.9–12.7)

## 2022-06-29 PROCEDURE — 85025 COMPLETE CBC W/AUTO DIFF WBC: CPT | Performed by: NURSE PRACTITIONER

## 2022-06-29 PROCEDURE — 80048 BASIC METABOLIC PNL TOTAL CA: CPT | Performed by: NURSE PRACTITIONER

## 2022-06-29 PROCEDURE — 36415 COLL VENOUS BLD VENIPUNCTURE: CPT | Performed by: NURSE PRACTITIONER

## 2022-06-29 RX ORDER — AMOXICILLIN AND CLAVULANATE POTASSIUM 875; 125 MG/1; MG/1
1 TABLET, FILM COATED ORAL EVERY 12 HOURS
Qty: 20 TABLET | Refills: 0 | OUTPATIENT
Start: 2022-06-29 | End: 2022-10-08

## 2022-06-29 NOTE — TELEPHONE ENCOUNTER
Wounds look good. Slight redness. Hard to determine as I'm unsure what they looked like prior. Order placed for 2 labs if you can assist her in getting them scheduled. Thanks.

## 2022-06-30 ENCOUNTER — TELEPHONE (OUTPATIENT)
Dept: SURGERY | Facility: CLINIC | Age: 52
End: 2022-06-30
Payer: MEDICAID

## 2022-06-30 NOTE — TELEPHONE ENCOUNTER
"Spoke with pt regarding appointment on 7/1. Pt stated, "she has an appointment with Alcira Gallego as well on the 4th floor the same day." Explained to pt that the appointment with Dr. Garces is located at the [facility] and the wound care appointment is located on the 4th floor of the main building. Pt verbalized understanding and denied anymore questions.   "

## 2022-07-01 ENCOUNTER — OFFICE VISIT (OUTPATIENT)
Dept: WOUND CARE | Facility: CLINIC | Age: 52
End: 2022-07-01
Payer: MEDICAID

## 2022-07-01 ENCOUNTER — OFFICE VISIT (OUTPATIENT)
Dept: SURGERY | Facility: CLINIC | Age: 52
End: 2022-07-01
Payer: MEDICAID

## 2022-07-01 VITALS
HEART RATE: 92 BPM | BODY MASS INDEX: 41.29 KG/M2 | HEIGHT: 64 IN | SYSTOLIC BLOOD PRESSURE: 169 MMHG | WEIGHT: 241.88 LBS | DIASTOLIC BLOOD PRESSURE: 85 MMHG

## 2022-07-01 DIAGNOSIS — T81.89XA NON-HEALING SURGICAL WOUND, INITIAL ENCOUNTER: Primary | ICD-10-CM

## 2022-07-01 DIAGNOSIS — Z93.3 COLOSTOMY IN PLACE: Primary | ICD-10-CM

## 2022-07-01 PROCEDURE — 3008F BODY MASS INDEX DOCD: CPT | Mod: CPTII,,, | Performed by: SURGERY

## 2022-07-01 PROCEDURE — 3008F PR BODY MASS INDEX (BMI) DOCUMENTED: ICD-10-PCS | Mod: CPTII,,, | Performed by: SURGERY

## 2022-07-01 PROCEDURE — 99024 POSTOP FOLLOW-UP VISIT: CPT | Mod: ,,, | Performed by: SURGERY

## 2022-07-01 PROCEDURE — 3077F SYST BP >= 140 MM HG: CPT | Mod: CPTII,,, | Performed by: SURGERY

## 2022-07-01 PROCEDURE — 1159F MED LIST DOCD IN RCRD: CPT | Mod: CPTII,,, | Performed by: CLINICAL NURSE SPECIALIST

## 2022-07-01 PROCEDURE — 99999 PR PBB SHADOW E&M-EST. PATIENT-LVL III: CPT | Mod: PBBFAC,,, | Performed by: SURGERY

## 2022-07-01 PROCEDURE — 1159F MED LIST DOCD IN RCRD: CPT | Mod: CPTII,,, | Performed by: SURGERY

## 2022-07-01 PROCEDURE — 99999 PR PBB SHADOW E&M-EST. PATIENT-LVL III: ICD-10-PCS | Mod: PBBFAC,,, | Performed by: CLINICAL NURSE SPECIALIST

## 2022-07-01 PROCEDURE — 99213 OFFICE O/P EST LOW 20 MIN: CPT | Mod: PBBFAC | Performed by: SURGERY

## 2022-07-01 PROCEDURE — 99999 PR PBB SHADOW E&M-EST. PATIENT-LVL III: ICD-10-PCS | Mod: PBBFAC,,, | Performed by: SURGERY

## 2022-07-01 PROCEDURE — 1160F RVW MEDS BY RX/DR IN RCRD: CPT | Mod: CPTII,,, | Performed by: CLINICAL NURSE SPECIALIST

## 2022-07-01 PROCEDURE — 3079F PR MOST RECENT DIASTOLIC BLOOD PRESSURE 80-89 MM HG: ICD-10-PCS | Mod: CPTII,,, | Performed by: SURGERY

## 2022-07-01 PROCEDURE — 99999 PR PBB SHADOW E&M-EST. PATIENT-LVL III: CPT | Mod: PBBFAC,,, | Performed by: CLINICAL NURSE SPECIALIST

## 2022-07-01 PROCEDURE — 3079F DIAST BP 80-89 MM HG: CPT | Mod: CPTII,,, | Performed by: SURGERY

## 2022-07-01 PROCEDURE — 99213 OFFICE O/P EST LOW 20 MIN: CPT | Mod: PBBFAC,27 | Performed by: CLINICAL NURSE SPECIALIST

## 2022-07-01 PROCEDURE — 99024 POSTOP FOLLOW-UP VISIT: CPT | Mod: ,,, | Performed by: CLINICAL NURSE SPECIALIST

## 2022-07-01 PROCEDURE — 99024 PR POST-OP FOLLOW-UP VISIT: ICD-10-PCS | Mod: ,,, | Performed by: SURGERY

## 2022-07-01 PROCEDURE — 1159F PR MEDICATION LIST DOCUMENTED IN MEDICAL RECORD: ICD-10-PCS | Mod: CPTII,,, | Performed by: CLINICAL NURSE SPECIALIST

## 2022-07-01 PROCEDURE — 1160F PR REVIEW ALL MEDS BY PRESCRIBER/CLIN PHARMACIST DOCUMENTED: ICD-10-PCS | Mod: CPTII,,, | Performed by: CLINICAL NURSE SPECIALIST

## 2022-07-01 PROCEDURE — 1159F PR MEDICATION LIST DOCUMENTED IN MEDICAL RECORD: ICD-10-PCS | Mod: CPTII,,, | Performed by: SURGERY

## 2022-07-01 PROCEDURE — 3077F PR MOST RECENT SYSTOLIC BLOOD PRESSURE >= 140 MM HG: ICD-10-PCS | Mod: CPTII,,, | Performed by: SURGERY

## 2022-07-01 PROCEDURE — 99024 PR POST-OP FOLLOW-UP VISIT: ICD-10-PCS | Mod: ,,, | Performed by: CLINICAL NURSE SPECIALIST

## 2022-07-01 NOTE — PROGRESS NOTES
Colon & Rectal Surgery Clinic Follow Up    HPI:   Marika Lindsey is a 51 y.o. female who presents for wound check at ostomy closure site.  Had cellulitis and was started on antibiotics. Reports large volume purulent drainage from ostomy site last night.  Taking antibiotics without difficulty.      Objective:   Vitals:    07/01/22 1304   BP: (!) 169/85   Pulse: 92        Physical Exam   Gen: well developed female, NAD  HEENT: normocephalic, atraumatic, PERRL, EOMI   CV: RRR, no murmurs  Resp: nonlabored, CTAB   Abd: soft, midline wound with packing in place, ostomy site with scant purulent drainage and surrounding cellulitis     Assessment and Plan:   Marika Lindsey  is a 51 y.o. female who presents for wound check    - Wound probed in clinic, patient has afternoon appointment with Alcira as well for wound care.    - Follow up in 2 weeks   - complete antibiotic therapy      Adelina Garces MD  Staff Surgeon   Colon & Rectal Surgery

## 2022-07-01 NOTE — PROGRESS NOTES
Subjective:       Patient ID: Marika Lindsey is a 51 y.o. female.    Chief Complaint: Wound Check and Post-op Evaluation    This is my first meeting with Ms Lindsey who is post colostomy closure and here for wound consult.  She had temp colostomy after a diverticular perf in January of this year. And has an infected site , she also has a NOT YET healed midline wound from that operation in January and is going to [facility] weekly for care and seen by [facility]  for MWF visits .     Review of Systems   Constitutional: Positive for fatigue. Negative for activity change and appetite change.   HENT: Negative.    Respiratory: Negative.    Cardiovascular: Negative.    Gastrointestinal: Negative for constipation and diarrhea.   Genitourinary: Negative.    Musculoskeletal: Negative.    Integumentary:  Positive for wound.         Objective:      Physical Exam  Constitutional:       Appearance: Normal appearance.   Pulmonary:      Effort: Pulmonary effort is normal. No respiratory distress.   Abdominal:      General: Bowel sounds are normal. There is no distension.      Palpations: Abdomen is soft.   Skin:     Findings: Erythema present.   Neurological:      Mental Status: She is alert.         7/1/22  Can see old midline wound non healed that is just a tunnel that is 4.1 cm   Has been using alginate per wound center . She is interested in my helping with this wound as well today and make any recs that seem helpful, she is happy to not go to wound center for now and let me and Dr Garces follow post colostomy closure     The old colostomy site which has erythema apparently drained over night and is not open with pocket to pack  Redness noted and outlined . She was started on antibiotic in past day or so.    Only the 9 o'clock side is open and about 1-2 cm deep      Assessment:       Problem List Items Addressed This Visit    None     Visit Diagnoses     Non-healing surgical wound, initial encounter    -  Primary          Plan:        Wound care regimen to be changed and orders sent to [facility]   3x week nurse visits for care  Cleanse each wound with VASHE solution and allow the vashe to sit in wound for 3-5 min   Then pack with strip packing and IODOSORB    Cover with appropriate cover dressing     Can replace cover dressing if needed between visits  Ok to shower   ( I gave her supplies to do the above dressing for next week or so )     Diet reviewed   Fu in 2 weeks

## 2022-07-14 NOTE — PROGRESS NOTES
Colon & Rectal Surgery Clinic Follow Up    HPI:   Marika Lindsey is a 51 y.o. female who presents for follow up after robotic colostomy reversal for wound check.  Her ostomy site is still draining some serous fluid.  Midline wound is more shallow and doing better.  Bowel movements are still not back to her usual feels some urgency and straining.        Objective:   Vitals:    07/15/22 0850   BP: (!) 150/73   Pulse: 82        Physical Exam   Gen: well developed female, NAD  HEENT: normocephalic, atraumatic, PERRL, EOMI   CV: RRR, no murmurs  Resp: nonlabored, CTAB   Abd: soft, NTND, midline wound with small sinus tract, ostomy site with serous drainage, + granulation tissue, mild surrounding skin irritation   MSK: no gross deformities, no cyanosis or edema     Assessment and Plan:   Marika Lindsey  is a 51 y.o. female who presents for follow up of colostomy reversal    - patient is overall progressing, will see Alcira later today for ongoing wound care  - Will start fiber supplement to assist with bowel movements.  Discussed that it may take up to 3 months for bowel function to normalize and may not be the same as before.  May require pelvic floor PT if no improvement.   - Follow up in 1 month       Adelina Garces MD  Staff Surgeon   Colon & Rectal Surgery

## 2022-07-15 ENCOUNTER — OFFICE VISIT (OUTPATIENT)
Dept: WOUND CARE | Facility: CLINIC | Age: 52
End: 2022-07-15
Payer: MEDICAID

## 2022-07-15 ENCOUNTER — OFFICE VISIT (OUTPATIENT)
Dept: SURGERY | Facility: OTHER | Age: 52
End: 2022-07-15
Attending: SURGERY
Payer: MEDICAID

## 2022-07-15 VITALS
DIASTOLIC BLOOD PRESSURE: 73 MMHG | HEIGHT: 64 IN | HEART RATE: 82 BPM | SYSTOLIC BLOOD PRESSURE: 150 MMHG | BODY MASS INDEX: 42.3 KG/M2 | WEIGHT: 247.81 LBS

## 2022-07-15 DIAGNOSIS — Z93.3 COLOSTOMY IN PLACE: Primary | ICD-10-CM

## 2022-07-15 DIAGNOSIS — T81.89XD NON-HEALING SURGICAL WOUND, SUBSEQUENT ENCOUNTER: Primary | ICD-10-CM

## 2022-07-15 PROCEDURE — 1160F PR REVIEW ALL MEDS BY PRESCRIBER/CLIN PHARMACIST DOCUMENTED: ICD-10-PCS | Mod: CPTII,,, | Performed by: CLINICAL NURSE SPECIALIST

## 2022-07-15 PROCEDURE — 99024 PR POST-OP FOLLOW-UP VISIT: ICD-10-PCS | Mod: ,,, | Performed by: SURGERY

## 2022-07-15 PROCEDURE — 99214 PR OFFICE/OUTPT VISIT, EST, LEVL IV, 30-39 MIN: ICD-10-PCS | Mod: S$PBB,,, | Performed by: CLINICAL NURSE SPECIALIST

## 2022-07-15 PROCEDURE — 99213 OFFICE O/P EST LOW 20 MIN: CPT | Mod: PBBFAC | Performed by: SURGERY

## 2022-07-15 PROCEDURE — 99024 POSTOP FOLLOW-UP VISIT: CPT | Mod: ,,, | Performed by: SURGERY

## 2022-07-15 PROCEDURE — 99999 PR PBB SHADOW E&M-EST. PATIENT-LVL III: CPT | Mod: PBBFAC,,, | Performed by: CLINICAL NURSE SPECIALIST

## 2022-07-15 PROCEDURE — 1159F PR MEDICATION LIST DOCUMENTED IN MEDICAL RECORD: ICD-10-PCS | Mod: CPTII,,, | Performed by: CLINICAL NURSE SPECIALIST

## 2022-07-15 PROCEDURE — 99214 OFFICE O/P EST MOD 30 MIN: CPT | Mod: S$PBB,,, | Performed by: CLINICAL NURSE SPECIALIST

## 2022-07-15 PROCEDURE — 1160F RVW MEDS BY RX/DR IN RCRD: CPT | Mod: CPTII,,, | Performed by: CLINICAL NURSE SPECIALIST

## 2022-07-15 PROCEDURE — 1111F PR DISCHARGE MEDS RECONCILED W/ CURRENT OUTPATIENT MED LIST: ICD-10-PCS | Mod: CPTII,,, | Performed by: CLINICAL NURSE SPECIALIST

## 2022-07-15 PROCEDURE — 99999 PR PBB SHADOW E&M-EST. PATIENT-LVL III: CPT | Mod: PBBFAC,,, | Performed by: SURGERY

## 2022-07-15 PROCEDURE — 3077F SYST BP >= 140 MM HG: CPT | Mod: CPTII,,, | Performed by: SURGERY

## 2022-07-15 PROCEDURE — 99213 OFFICE O/P EST LOW 20 MIN: CPT | Mod: PBBFAC,27 | Performed by: CLINICAL NURSE SPECIALIST

## 2022-07-15 PROCEDURE — 3078F PR MOST RECENT DIASTOLIC BLOOD PRESSURE < 80 MM HG: ICD-10-PCS | Mod: CPTII,,, | Performed by: SURGERY

## 2022-07-15 PROCEDURE — 3008F BODY MASS INDEX DOCD: CPT | Mod: CPTII,,, | Performed by: SURGERY

## 2022-07-15 PROCEDURE — 99999 PR PBB SHADOW E&M-EST. PATIENT-LVL III: ICD-10-PCS | Mod: PBBFAC,,, | Performed by: SURGERY

## 2022-07-15 PROCEDURE — 1111F DSCHRG MED/CURRENT MED MERGE: CPT | Mod: CPTII,,, | Performed by: CLINICAL NURSE SPECIALIST

## 2022-07-15 PROCEDURE — 1159F MED LIST DOCD IN RCRD: CPT | Mod: CPTII,,, | Performed by: CLINICAL NURSE SPECIALIST

## 2022-07-15 PROCEDURE — 3008F PR BODY MASS INDEX (BMI) DOCUMENTED: ICD-10-PCS | Mod: CPTII,,, | Performed by: SURGERY

## 2022-07-15 PROCEDURE — 3077F PR MOST RECENT SYSTOLIC BLOOD PRESSURE >= 140 MM HG: ICD-10-PCS | Mod: CPTII,,, | Performed by: SURGERY

## 2022-07-15 PROCEDURE — 99999 PR PBB SHADOW E&M-EST. PATIENT-LVL III: ICD-10-PCS | Mod: PBBFAC,,, | Performed by: CLINICAL NURSE SPECIALIST

## 2022-07-15 PROCEDURE — 3078F DIAST BP <80 MM HG: CPT | Mod: CPTII,,, | Performed by: SURGERY

## 2022-07-15 NOTE — PROGRESS NOTES
Subjective:       Patient ID: Marika Lindsey is a 51 y.o. female.    Chief Complaint: Wound Check    This is a fu for wound eval , this pt  is post colostomy closure  She had temp colostomy after a diverticular perf in January of this year. And has an infected site , she also has a NOT YET healed midline wound from that operation in January and is going to [facility] weekly for care and seen by [facility]  for MWF visits .     Wound Check      Review of Systems   Constitutional: Positive for fatigue. Negative for activity change and appetite change.   HENT: Negative.    Respiratory: Negative.    Cardiovascular: Negative.    Gastrointestinal: Negative for constipation and diarrhea.   Genitourinary: Negative.    Musculoskeletal: Negative.    Integumentary:  Positive for wound.         Objective:      Physical Exam  Constitutional:       Appearance: Normal appearance.   Pulmonary:      Effort: Pulmonary effort is normal. No respiratory distress.   Abdominal:      General: Bowel sounds are normal. There is no distension.      Palpations: Abdomen is soft.   Skin:     Findings: Erythema present.   Neurological:      Mental Status: She is alert.         No PHOTO today   This week very clear progress can be seen , the colo site is now completely skin level and 33% smaller   Erythema just about gone     THE OLD MIDLINE TUNNEL  Is also improved and only 3 cm deep ( was 4.1)  and that tiny tunnel was hard to find    WILL CHANGE PLAN OF CARE   Continue with MWF visits  Cleanse with Vashe and allow a soak time as previous  Pack midline wound with DAMIAN  And cover colostomy wound site with DAMIAN  Cover with appropriate cover dressing         FIRST VISIT      7/1/22  Can see old midline wound non healed that is just a tunnel that is 4.1 cm   Has been using alginate per wound center . She is interested in my helping with this wound as well today and make any recs that seem helpful, she is happy to not go to wound center for  now and let me and Dr Garces follow post colostomy closure     The old colostomy site which has erythema apparently drained over night and is not open with pocket to pack  Redness noted and outlined . She was started on antibiotic in past day or so.    Only the 9 o'clock side is open and about 1-2 cm deep      Assessment:       Problem List Items Addressed This Visit    None     Visit Diagnoses     Non-healing surgical wound, subsequent encounter    -  Primary          Plan:       Wound care regimen to be changed and orders sent to [facility]    3x week nurse visits for care  Cleanse each wound with VASHE solution and allow the vashe to sit in wound for 3-5 min   Then pack with DAMIAN  Cover with appropriate cover dressing     Can replace cover dressing if needed between visits  Ok to shower   ( I gave her supplies to do the above dressing for next week or so )     To begin WOUND YAN  ( in place of [facility]  Due to cost )

## 2022-09-04 ENCOUNTER — PATIENT MESSAGE (OUTPATIENT)
Dept: SURGERY | Facility: OTHER | Age: 52
End: 2022-09-04
Payer: MEDICAID

## 2022-10-08 ENCOUNTER — HOSPITAL ENCOUNTER (EMERGENCY)
Facility: HOSPITAL | Age: 52
Discharge: HOME | End: 2022-10-08
Attending: EMERGENCY MEDICINE
Payer: MEDICAID

## 2022-10-08 ENCOUNTER — NURSE TRIAGE (OUTPATIENT)
Dept: ADMINISTRATIVE | Facility: CLINIC | Age: 52
End: 2022-10-08
Payer: MEDICAID

## 2022-10-08 VITALS
RESPIRATION RATE: 16 BRPM | HEART RATE: 75 BPM | BODY MASS INDEX: 42.68 KG/M2 | SYSTOLIC BLOOD PRESSURE: 121 MMHG | HEIGHT: 64 IN | DIASTOLIC BLOOD PRESSURE: 95 MMHG | OXYGEN SATURATION: 98 % | WEIGHT: 250 LBS | TEMPERATURE: 98 F

## 2022-10-08 DIAGNOSIS — L02.91 ABSCESS: ICD-10-CM

## 2022-10-08 DIAGNOSIS — R10.9 ABDOMINAL PAIN, UNSPECIFIED ABDOMINAL LOCATION: Primary | ICD-10-CM

## 2022-10-08 LAB
ALBUMIN SERPL BCP-MCNC: 4 G/DL (ref 3.5–5.2)
ALP SERPL-CCNC: 66 U/L (ref 55–135)
ALT SERPL W/O P-5'-P-CCNC: 19 U/L (ref 10–44)
ANION GAP SERPL CALC-SCNC: 10 MMOL/L (ref 8–16)
AST SERPL-CCNC: 19 U/L (ref 10–40)
BASOPHILS # BLD AUTO: 0.04 K/UL (ref 0–0.2)
BASOPHILS NFR BLD: 0.6 % (ref 0–1.9)
BILIRUB SERPL-MCNC: 0.4 MG/DL (ref 0.1–1)
BUN SERPL-MCNC: 17 MG/DL (ref 6–20)
BUN SERPL-MCNC: 22 MG/DL (ref 6–30)
CALCIUM SERPL-MCNC: 9.8 MG/DL (ref 8.7–10.5)
CHLORIDE SERPL-SCNC: 106 MMOL/L (ref 95–110)
CHLORIDE SERPL-SCNC: 106 MMOL/L (ref 95–110)
CO2 SERPL-SCNC: 23 MMOL/L (ref 23–29)
CREAT SERPL-MCNC: 0.8 MG/DL (ref 0.5–1.4)
CREAT SERPL-MCNC: 0.8 MG/DL (ref 0.5–1.4)
CRP SERPL-MCNC: 10.7 MG/L (ref 0–8.2)
DIFFERENTIAL METHOD: NORMAL
EOSINOPHIL # BLD AUTO: 0.4 K/UL (ref 0–0.5)
EOSINOPHIL NFR BLD: 5.9 % (ref 0–8)
ERYTHROCYTE [DISTWIDTH] IN BLOOD BY AUTOMATED COUNT: 13.9 % (ref 11.5–14.5)
ERYTHROCYTE [SEDIMENTATION RATE] IN BLOOD BY PHOTOMETRIC METHOD: 26 MM/HR (ref 0–36)
EST. GFR  (NO RACE VARIABLE): >60 ML/MIN/1.73 M^2
GLUCOSE SERPL-MCNC: 94 MG/DL (ref 70–110)
GLUCOSE SERPL-MCNC: 99 MG/DL (ref 70–110)
HCT VFR BLD AUTO: 41.8 % (ref 37–48.5)
HCT VFR BLD CALC: 42 %PCV (ref 36–54)
HCV AB SERPL QL IA: NORMAL
HGB BLD-MCNC: 13.6 G/DL (ref 12–16)
HIV 1+2 AB+HIV1 P24 AG SERPL QL IA: NORMAL
IMM GRANULOCYTES # BLD AUTO: 0.01 K/UL (ref 0–0.04)
IMM GRANULOCYTES NFR BLD AUTO: 0.2 % (ref 0–0.5)
LACTATE SERPL-SCNC: 0.6 MMOL/L (ref 0.5–2.2)
LYMPHOCYTES # BLD AUTO: 1.6 K/UL (ref 1–4.8)
LYMPHOCYTES NFR BLD: 24.1 % (ref 18–48)
MCH RBC QN AUTO: 28.2 PG (ref 27–31)
MCHC RBC AUTO-ENTMCNC: 32.5 G/DL (ref 32–36)
MCV RBC AUTO: 87 FL (ref 82–98)
MONOCYTES # BLD AUTO: 0.6 K/UL (ref 0.3–1)
MONOCYTES NFR BLD: 9 % (ref 4–15)
NEUTROPHILS # BLD AUTO: 4 K/UL (ref 1.8–7.7)
NEUTROPHILS NFR BLD: 60.2 % (ref 38–73)
NRBC BLD-RTO: 0 /100 WBC
PLATELET # BLD AUTO: 275 K/UL (ref 150–450)
PMV BLD AUTO: 9.8 FL (ref 9.2–12.9)
POC IONIZED CALCIUM: 1.18 MMOL/L (ref 1.06–1.42)
POC TCO2 (MEASURED): 26 MMOL/L (ref 23–29)
POTASSIUM BLD-SCNC: 4.8 MMOL/L (ref 3.5–5.1)
POTASSIUM SERPL-SCNC: 4.1 MMOL/L (ref 3.5–5.1)
PROT SERPL-MCNC: 7.2 G/DL (ref 6–8.4)
RBC # BLD AUTO: 4.83 M/UL (ref 4–5.4)
SAMPLE: NORMAL
SODIUM BLD-SCNC: 138 MMOL/L (ref 136–145)
SODIUM SERPL-SCNC: 139 MMOL/L (ref 136–145)
WBC # BLD AUTO: 6.56 K/UL (ref 3.9–12.7)

## 2022-10-08 PROCEDURE — 99285 EMERGENCY DEPT VISIT HI MDM: CPT | Mod: ,,, | Performed by: EMERGENCY MEDICINE

## 2022-10-08 PROCEDURE — 10060 I&D ABSCESS SIMPLE/SINGLE: CPT | Performed by: COLON & RECTAL SURGERY

## 2022-10-08 PROCEDURE — 85025 COMPLETE CBC W/AUTO DIFF WBC: CPT | Performed by: EMERGENCY MEDICINE

## 2022-10-08 PROCEDURE — 80053 COMPREHEN METABOLIC PANEL: CPT | Performed by: EMERGENCY MEDICINE

## 2022-10-08 PROCEDURE — 99285 EMERGENCY DEPT VISIT HI MDM: CPT | Mod: 25

## 2022-10-08 PROCEDURE — 86803 HEPATITIS C AB TEST: CPT | Performed by: PHYSICIAN ASSISTANT

## 2022-10-08 PROCEDURE — 85652 RBC SED RATE AUTOMATED: CPT | Performed by: EMERGENCY MEDICINE

## 2022-10-08 PROCEDURE — 10060 I&D ABSCESS SIMPLE/SINGLE: CPT

## 2022-10-08 PROCEDURE — 25500020 PHARM REV CODE 255: Performed by: EMERGENCY MEDICINE

## 2022-10-08 PROCEDURE — 83605 ASSAY OF LACTIC ACID: CPT | Performed by: EMERGENCY MEDICINE

## 2022-10-08 PROCEDURE — 25000003 PHARM REV CODE 250: Performed by: STUDENT IN AN ORGANIZED HEALTH CARE EDUCATION/TRAINING PROGRAM

## 2022-10-08 PROCEDURE — 99285 PR EMERGENCY DEPT VISIT,LEVEL V: ICD-10-PCS | Mod: ,,, | Performed by: EMERGENCY MEDICINE

## 2022-10-08 PROCEDURE — 87389 HIV-1 AG W/HIV-1&-2 AB AG IA: CPT | Performed by: PHYSICIAN ASSISTANT

## 2022-10-08 PROCEDURE — 87040 BLOOD CULTURE FOR BACTERIA: CPT | Performed by: EMERGENCY MEDICINE

## 2022-10-08 PROCEDURE — 86140 C-REACTIVE PROTEIN: CPT | Performed by: EMERGENCY MEDICINE

## 2022-10-08 RX ORDER — AMOXICILLIN AND CLAVULANATE POTASSIUM 875; 125 MG/1; MG/1
1 TABLET, FILM COATED ORAL 2 TIMES DAILY
Qty: 14 TABLET | Refills: 0 | Status: SHIPPED | OUTPATIENT
Start: 2022-10-08 | End: 2022-10-15

## 2022-10-08 RX ORDER — LIDOCAINE HYDROCHLORIDE 10 MG/ML
10 INJECTION, SOLUTION EPIDURAL; INFILTRATION; INTRACAUDAL; PERINEURAL ONCE
Status: COMPLETED | OUTPATIENT
Start: 2022-10-08 | End: 2022-10-08

## 2022-10-08 RX ADMIN — IOHEXOL 75 ML: 350 INJECTION, SOLUTION INTRAVENOUS at 09:10

## 2022-10-08 RX ADMIN — LIDOCAINE HYDROCHLORIDE 100 MG: 10 INJECTION, SOLUTION EPIDURAL; INFILTRATION; INTRACAUDAL at 11:10

## 2022-10-08 NOTE — DISCHARGE INSTRUCTIONS
Start antibiotics as directed   Continue follow up with wound care   Return with worsening symptoms or fever

## 2022-10-08 NOTE — CONSULTS
[facility] - Emergency Dept  Colorectal Surgery  Consult Note    Patient Name: Marika Lindsey  MRN: [id number]  Admission Date: 10/8/2022  Hospital Length of Stay: 0 days  Attending Physician: Maggi Mccracken MD  Primary Care Provider: CHELSEA French    Inpatient consult to Colorectal Surgery  Consult performed by: Jay Aleman MD  Consult ordered by: Maggi Mccracken MD        Subjective:     Chief Complaint/Reason for Admission: abscess    History of Present Illness:  51-year-old female with history of diverticulitis s/p Liz's with end colostomy reversal June 2022 now presenting with redness and swelling over previous left-sided colostomy site associated with central skin thinning and discoloration. CT abdomen/pelvis shows subcutaneous abscess over this site. ED didn't feel comfortable with I&D and CRS was consulted. Denies fevers, chills, nausea, vomiting, abdominal pain, or any other complaints at this time.       (Not in a hospital admission)      Review of patient's allergies indicates:  No Known Allergies    Past Medical History:   Diagnosis Date    Dehisced gastrointestinal anastomosis     Diverticulitis     GERD (gastroesophageal reflux disease)     Thyroid disease     Wound dehiscence      Past Surgical History:   Procedure Laterality Date    COLON SURGERY  01/2022    sigmoid colon removed    COLONOSCOPY  2021    COLONOSCOPY  2022    COLOSTOMY      ROBOTIC CLOSURE, COLOSTOMY N/A 6/14/2022    Procedure: ROBOTIC CLOSURE, COLOSTOMY ;  Surgeon: Adelina Garces MD;  Location: [facility];  Service: Colon and Rectal;  Laterality: N/A;  W/ FLEXIBLE SIGMOIDOSCOPY   LITHOTOMY EEA STAPLER       URETHRA SURGERY       Family History    None       Tobacco Use    Smoking status: Former    Smokeless tobacco: Never   Substance and Sexual Activity    Alcohol use: Not Currently     Comment: rare    Drug use: Not on file    Sexual activity: Not on file     Review of Systems   Constitutional:   Negative for chills and fever.   HENT:  Negative for sore throat.    Eyes:  Negative for redness.   Respiratory:  Negative for shortness of breath.    Cardiovascular:  Negative for chest pain.   Gastrointestinal:  Negative for abdominal pain.   Endocrine: Negative for polyuria.   Genitourinary:  Negative for dysuria.   Musculoskeletal:  Negative for back pain.   Skin:  Positive for color change and wound.   Neurological:  Negative for headaches.   Psychiatric/Behavioral:  Negative for agitation.    Objective:     Vital Signs (Most Recent):  Temp: 98.1 °F (36.7 °C) (10/08/22 0936)  Pulse: 75 (10/08/22 0936)  Resp: 18 (10/08/22 0936)  BP: 137/80 (10/08/22 0936)  SpO2: 97 % (10/08/22 0936) Vital Signs (24h Range):  Temp:  [98.1 °F (36.7 °C)-98.8 °F (37.1 °C)] 98.1 °F (36.7 °C)  Pulse:  [75-77] 75  Resp:  [18] 18  SpO2:  [96 %-97 %] 97 %  BP: (137-159)/(72-80) 137/80     Weight: 113.4 kg (250 lb)  Body mass index is 42.91 kg/m².    Physical Exam  Vitals and nursing note reviewed.   Constitutional:       General: She is not in acute distress.     Appearance: She is well-developed.   HENT:      Head: Normocephalic and atraumatic.      Right Ear: External ear normal.      Left Ear: External ear normal.      Mouth/Throat:      Mouth: Mucous membranes are moist.   Eyes:      Conjunctiva/sclera: Conjunctivae normal.   Cardiovascular:      Rate and Rhythm: Normal rate.   Pulmonary:      Effort: Pulmonary effort is normal.   Abdominal:      Palpations: Abdomen is soft.      Tenderness: There is no abdominal tenderness.      Comments: Midline incision with inferior aspect with some granulation tissue. LLQ previous ostomy site with abscess.    Musculoskeletal:      Cervical back: Normal range of motion.   Skin:     General: Skin is warm and dry.   Neurological:      General: No focal deficit present.      Mental Status: She is alert.   Psychiatric:         Behavior: Behavior normal.     Significant Labs:  BMP:   Recent Labs   Lab  10/08/22  0928   GLU 94      K 4.1      CO2 23   BUN 17   CREATININE 0.8   CALCIUM 9.8     CBC:   Recent Labs   Lab 10/08/22  0928 10/08/22  0930   WBC 6.56  --    RBC 4.83  --    HGB 13.6  --    HCT 41.8 42     --    MCV 87  --    MCH 28.2  --    MCHC 32.5  --        Significant Diagnostics:  CT shows fluid collection at level of previous ostomy site.     Assessment/Plan:     Abscess  51-year-old female with history of previous Liz's that was reversed in June of 2022 now with small abscess near site of previous colostomy. ED concerned about bowel so I&D deferred to surgery. I&D performed. Okay for discharge on abx.     -start 7 days augmentin  -okay to discharge home  -will schedule follow up    Procedure Note:   Timeout performed. Skin prepped and draped. Local anesthetic applied. Incision made over abscess. 20cc bloody purulent drainage expressed. Probed and loculations broken. Irrigated. Packed. Dressing applied.         Thank you for your consult. I will follow-up with patient. Please contact us if you have any additional questions.    Jay Aleman MD  Colorectal Surgery  [facility] - Emergency Dept

## 2022-10-08 NOTE — ASSESSMENT & PLAN NOTE
51-year-old female with history of previous Liz's that was reversed in June of 2022 now with small abscess near site of previous colostomy. ED concerned about bowel so I&D deferred to surgery. I&D performed. Okay for discharge on abx.     -start 7 days augmentin  -okay to discharge home  -will schedule follow up    Procedure Note:   Timeout performed. Skin prepped and draped. Local anesthetic applied. Incision made over abscess. 20cc bloody purulent drainage expressed. Probed and loculations broken. Irrigated. Packed. Dressing applied.

## 2022-10-08 NOTE — ED PROVIDER NOTES
Encounter Date: 10/8/2022    SCRIBE #1 NOTE: I, Ailin Chen, am scribing for, and in the presence of,  Maggi Mccracken MD. I have scribed the following portions of the note - Other sections scribed: HPI, ROS, PE.     History     Chief Complaint   Patient presents with    Abdominal Pain     Colostomy reversal in June, now sight red , and hot per pt     Time patient was seen by the provider: 9:01 AM      The patient is a 51 y.o. female with a past medical history of dehisced gastrointestinal anastomosis, diverticulitis, gastroesophageal reflux disease, and thyroid disease who presents to the ED with a complaint of abdominal pain. The patient states that she has been experiencing abdominal pain since yesterday which has been gradual. She noticed a blister on her abdomen yesterday and became concerned. She notes having a reversal colostomy in June 2022. She affirms redness, tenderness, warmth, blister and discoloration . No other exacerbating or alleviating factors. Patient denies pain with current symptoms, fever, recent trauma/injuries to abdomen or other associated symptoms.    The history is provided by the patient and medical records. No  was used.   Review of patient's allergies indicates:  No Known Allergies  Past Medical History:   Diagnosis Date    Dehisced gastrointestinal anastomosis     Diverticulitis     GERD (gastroesophageal reflux disease)     Thyroid disease     Wound dehiscence      Past Surgical History:   Procedure Laterality Date    COLON SURGERY  01/2022    sigmoid colon removed    COLONOSCOPY  2021    COLONOSCOPY  2022    COLOSTOMY      ROBOTIC CLOSURE, COLOSTOMY N/A 6/14/2022    Procedure: ROBOTIC CLOSURE, COLOSTOMY ;  Surgeon: Adelina Garces MD;  Location: [facility];  Service: Colon and Rectal;  Laterality: N/A;  W/ FLEXIBLE SIGMOIDOSCOPY   LITHOTOMY EEA STAPLER       URETHRA SURGERY       History reviewed. No pertinent family history.  Social History     Tobacco Use     Smoking status: Former    Smokeless tobacco: Never   Substance Use Topics    Alcohol use: Not Currently     Comment: rare     Review of Systems   Constitutional:  Negative for fever.   HENT:  Negative for sore throat.    Respiratory:  Negative for shortness of breath.    Cardiovascular:  Negative for chest pain.   Gastrointestinal:  Positive for abdominal pain. Negative for nausea.   Genitourinary:  Negative for dysuria.   Musculoskeletal:  Negative for back pain.   Skin:  Positive for color change and wound. Negative for rash.   Neurological:  Negative for weakness.   Hematological:  Does not bruise/bleed easily.   Psychiatric/Behavioral:  The patient is nervous/anxious.      Physical Exam     Initial Vitals [10/08/22 0851]   BP Pulse Resp Temp SpO2   (!) 159/72 77 18 98.8 °F (37.1 °C) 96 %      MAP       --         Physical Exam    Nursing note and vitals reviewed.  Constitutional: She appears well-developed and well-nourished.   HENT:   Head: Normocephalic and atraumatic.   Mouth/Throat: Oropharynx is clear and moist.   Eyes: Conjunctivae and EOM are normal. Pupils are equal, round, and reactive to light.   Neck: Phonation normal. Neck supple.   Normal range of motion.  Cardiovascular:  Normal rate, regular rhythm, normal heart sounds and normal pulses.           Abdominal: Abdomen is soft. Bowel sounds are normal. A surgical scar is present. There is no abdominal tenderness.   Musculoskeletal:         General: Normal range of motion.      Cervical back: Normal range of motion and neck supple.     Neurological: She is alert and oriented to person, place, and time.   Skin: Skin is warm and dry. There is erythema.   LLQ there is a 3 x 3 area of erythema with surgical discoloration. Midline surgical incision site there is a 1 x 1 area of open wound.         ED Course   Procedures  Labs Reviewed   C-REACTIVE PROTEIN - Abnormal; Notable for the following components:       Result Value    CRP 10.7 (*)     All other  components within normal limits   CULTURE, BLOOD   CULTURE, BLOOD   HIV 1 / 2 ANTIBODY    Narrative:     Release to patient->Immediate   HEPATITIS C ANTIBODY    Narrative:     Release to patient->Immediate   CBC W/ AUTO DIFFERENTIAL   COMPREHENSIVE METABOLIC PANEL   LACTIC ACID, PLASMA   SEDIMENTATION RATE   ISTAT PROCEDURE   ISTAT CHEM8          Imaging Results              CT Abdomen Pelvis With Contrast (Final result)  Result time 10/08/22 10:16:44      Final result by Rula Mustafa MD (10/08/22 10:16:44)                   Impression:      Focal fluid collection with adjacent fat stranding in the subcutaneous fat along the left lower quadrant at site of prior colostomy.  This may represent seroma, infected collection or evolving hematoma.    Additional thin collection extending to the skin surface with adjacent fat stranding to the right of midline lower pelvis along surgical incision.  This may represent seroma, infected collection or evolving hematoma.    Few small nodules within the inferior lung, not present on previous imaging.  In a low risk patient no further follow-up is warranted.  In a high-risk patient, consider follow-up CT in 12 months.      Electronically signed by: Rula Mustafa MD  Date:    10/08/2022  Time:    10:16               Narrative:    EXAMINATION:  CT ABDOMEN PELVIS WITH CONTRAST    CLINICAL HISTORY:  Abdominal abscess/infection suspected;    TECHNIQUE:  Low dose axial images, sagittal and coronal reformations were obtained from the lung bases to the pubic symphysis following the IV administration of 75 mL of Omnipaque 350 and no oral contrast    COMPARISON:  Outside imaging from February 2022    FINDINGS:  There are few small pulmonary nodules at the lung bases, not definitely seen on prior imaging and measuring up to 5 mm.    In the left lower quadrant at the site in which there was prior colostomy there is ill-defined fat stranding with a focal fluid collection in the  subcutaneous fat measuring 3.9 x 1.9 by 3.2 cm.  Additionally, more inferiorly along the subcutaneous fat of the right pelvis is a thin fluid collection with adjacent fat stranding extending to the skin surface, measuring 5 x 1.0 by 3.3 cm.    The liver demonstrates no focal abnormality.  Spleen is not enlarged.  The stomach, pancreas, gallbladder and biliary system are within normal limits.    There is no adrenal mass.  Kidneys concentrate contrast satisfactorily without mass or hydronephrosis.  Bladder is unremarkable.    Aorta contains calcification along its wall without aneurysm.  No periaortic or pelvic lymphadenopathy.  Uterus is present.    There is surgical change within the sigmoid colon.  Bowel demonstrates no inflammatory change or distension.    The osseous structures demonstrate degenerative change.                                       Medications   iohexoL (OMNIPAQUE 350) injection 75 mL (75 mLs Intravenous Given 10/8/22 9933)   LIDOcaine (PF) 10 mg/ml (1%) injection 100 mg (100 mg Intradermal Given by Provider 10/8/22 4175)     Medical Decision Making:   History:   Old Medical Records: I decided to obtain old medical records.  Initial Assessment:   Evaluation of abdominal wall wound   Differential Diagnosis:   Cellulitis, abscess, ostomy failure   Independently Interpreted Test(s):   I have ordered and independently interpreted X-rays - see prior notes.  Clinical Tests:   Lab Tests: Ordered and Reviewed  Radiological Study: Ordered and Reviewed  ED Management:  Plan: CT scan  Other:   I have discussed this case with another health care provider.       <> Summary of the Discussion: 1047  Discussed with colorectal surgery        Scribe Attestation:   Scribe #1: I performed the above scribed service and the documentation accurately describes the services I performed. I attest to the accuracy of the note.    Attending Attestation:             Attending ED Notes:   Evaluated by CRS in ED. Abscess  drained. Will discharged with augmentin. Has wound care follow up. Strict return precautions advised.     ED Course as of 10/08/22 1344   Sat Oct 08, 2022   1029 Labs generally unremarkable. Mild elevation in CRP. CT scan concerning for fluid collection. CRS consulted.  [HS]      ED Course User Index  [HS] Maggi Mccracken MD                 Clinical Impression:   Final diagnoses:  [R10.9] Abdominal pain, unspecified abdominal location (Primary)  [L02.91] Abscess      ED Disposition Condition    Discharge Stable          ED Prescriptions       Medication Sig Dispense Start Date End Date Auth. Provider    amoxicillin-clavulanate 875-125mg (AUGMENTIN) 875-125 mg per tablet Take 1 tablet by mouth 2 (two) times daily. for 7 days 14 tablet 10/8/2022 10/15/2022 Maggi Mccracken MD          Follow-up Information       Follow up With Specialties Details Why Contact Info    Mukul Davison, [facility]   [address] [facility] [address]  [phone]      Adelina Garces MD Colon and Rectal Surgery   [address] [facility] [address] [address]  [phone]               Maggi Mccracken MD  10/08/22 1344

## 2022-10-08 NOTE — TELEPHONE ENCOUNTER
Reason for Disposition   Stoma turns purple or black     Colostomy was reversed in June 2022, Dr Adelina Garces.  The scar at the site of original stoma is now black, green / purple per Marika, and very warm.    Additional Information   Negative: Shock suspected (e.g., cold/pale/clammy skin, too weak to stand, low BP, rapid pulse)   Negative: Sounds like a life-threatening emergency to the triager   Negative: [1] Post-op AND [2] and general post-operative symptoms or questions, unrelated to ostomy   Negative: [1] SEVERE abdominal pain (e.g., excruciating) AND [2] present > 1 hour   Negative: Bloody, tarry, or black-colored stool (not dark green)   Negative: [1] Bleeding from stoma tissue (stoma is moist, pink to red-colored tissue) AND [2] won't stop after 10 minutes of direct pressure (using correct technique)   Negative: Stoma separates from the surrounding skin at the suture line (e.g., gaping wound next to stoma)    Protocols used: Ostomy Symptoms and [id number]    Marika said she had colostomy reversal in June, and this morning she states concern for  a return of infection.  The scar seemed to be fine, but now she notices that the scar is reddened yesterday, and this morning there is blistering, scar has become black and green.Tender to touch, more than usual, and is also feeling very warm. She sees Alcira Gallego for enterostomal therapy regularly, and has appt with her tomorrow. She said she also has HH for abdominal incision. Per [facility] triage protocol, recommend ED now for evaluation.  She said she will go to [facility] ED now.  Message to Adelina Garces MD, and TIMOTHY Varela.  Please contact caller directly with any additional care advice.

## 2022-10-08 NOTE — SUBJECTIVE & OBJECTIVE
(Not in a hospital admission)      Review of patient's allergies indicates:  No Known Allergies    Past Medical History:   Diagnosis Date    Dehisced gastrointestinal anastomosis     Diverticulitis     GERD (gastroesophageal reflux disease)     Thyroid disease     Wound dehiscence      Past Surgical History:   Procedure Laterality Date    COLON SURGERY  01/2022    sigmoid colon removed    COLONOSCOPY  2021    COLONOSCOPY  2022    COLOSTOMY      ROBOTIC CLOSURE, COLOSTOMY N/A 6/14/2022    Procedure: ROBOTIC CLOSURE, COLOSTOMY ;  Surgeon: Adelina Garces MD;  Location: [facility];  Service: Colon and Rectal;  Laterality: N/A;  W/ FLEXIBLE SIGMOIDOSCOPY   LITHOTOMY EEA STAPLER       URETHRA SURGERY       Family History    None       Tobacco Use    Smoking status: Former    Smokeless tobacco: Never   Substance and Sexual Activity    Alcohol use: Not Currently     Comment: rare    Drug use: Not on file    Sexual activity: Not on file     Review of Systems   Constitutional:  Negative for chills and fever.   HENT:  Negative for sore throat.    Eyes:  Negative for redness.   Respiratory:  Negative for shortness of breath.    Cardiovascular:  Negative for chest pain.   Gastrointestinal:  Negative for abdominal pain.   Endocrine: Negative for polyuria.   Genitourinary:  Negative for dysuria.   Musculoskeletal:  Negative for back pain.   Skin:  Positive for color change and wound.   Neurological:  Negative for headaches.   Psychiatric/Behavioral:  Negative for agitation.    Objective:     Vital Signs (Most Recent):  Temp: 98.1 °F (36.7 °C) (10/08/22 0936)  Pulse: 75 (10/08/22 0936)  Resp: 18 (10/08/22 0936)  BP: 137/80 (10/08/22 0936)  SpO2: 97 % (10/08/22 0936) Vital Signs (24h Range):  Temp:  [98.1 °F (36.7 °C)-98.8 °F (37.1 °C)] 98.1 °F (36.7 °C)  Pulse:  [75-77] 75  Resp:  [18] 18  SpO2:  [96 %-97 %] 97 %  BP: (137-159)/(72-80) 137/80     Weight: 113.4 kg (250 lb)  Body mass index is 42.91 kg/m².    Physical Exam  Vitals and  nursing note reviewed.   Constitutional:       General: She is not in acute distress.     Appearance: She is well-developed.   HENT:      Head: Normocephalic and atraumatic.      Right Ear: External ear normal.      Left Ear: External ear normal.      Mouth/Throat:      Mouth: Mucous membranes are moist.   Eyes:      Conjunctiva/sclera: Conjunctivae normal.   Cardiovascular:      Rate and Rhythm: Normal rate.   Pulmonary:      Effort: Pulmonary effort is normal.   Abdominal:      Palpations: Abdomen is soft.      Tenderness: There is no abdominal tenderness.      Comments: Midline incision with inferior aspect with some granulation tissue. LLQ previous ostomy site with abscess.    Musculoskeletal:      Cervical back: Normal range of motion.   Skin:     General: Skin is warm and dry.   Neurological:      General: No focal deficit present.      Mental Status: She is alert.   Psychiatric:         Behavior: Behavior normal.     Significant Labs:  BMP:   Recent Labs   Lab 10/08/22  0928   GLU 94      K 4.1      CO2 23   BUN 17   CREATININE 0.8   CALCIUM 9.8     CBC:   Recent Labs   Lab 10/08/22  0928 10/08/22  0930   WBC 6.56  --    RBC 4.83  --    HGB 13.6  --    HCT 41.8 42     --    MCV 87  --    MCH 28.2  --    MCHC 32.5  --        Significant Diagnostics:  CT shows fluid collection at level of previous ostomy site.

## 2022-10-08 NOTE — HPI
51-year-old female with history of diverticulitis s/p Liz's with end colostomy reversal June 2022 now presenting with redness and swelling over previous left-sided colostomy site associated with central skin thinning and discoloration. CT abdomen/pelvis shows subcutaneous abscess over this site. ED didn't feel comfortable with I&D and CRS was consulted. Denies fevers, chills, nausea, vomiting, abdominal pain, or any other complaints at this time.

## 2022-10-08 NOTE — ED NOTES
LOC: The patient is awake, alert, and oriented to self, place, time, and situation. Pt is calm and cooperative. Affect is appropriate.  Speech is appropriate and clear.     APPEARANCE: Patient resting comfortably in no acute distress.  Patient is clean and well groomed.    SKIN: The skin is warm and dry; color consistent with ethnicity.  Patient has normal skin turgor and moist mucus membranes.  Skin intact; no breakdown or bruising noted.     MUSCULOSKELETAL: Patient moving upper and lower extremities without difficulty; denies pain in the extremities or back.  Denies weakness.     RESPIRATORY: Airway is open and patent. Respirations spontaneous, even, easy, and non-labored.  Patient has a normal effort and rate.  No accessory muscle use noted. Denies cough.     CARDIAC: No peripheral edema noted. No complaints of chest pain.      ABDOMEN: Soft and non tender to palpation.  No distention noted. Pt denies abdominal pain. Midline incision with dressing, 1 cm area with drainage, being treated by home health, area at colostomy reveral site with new redness and dark area.     NEUROLOGIC: Eyes open spontaneously.  Behavior appropriate to situation.  Follows commands; facial expression symmetrical.  Purposeful motor response noted; normal sensation in all extremities. Pt denies headache; denies lightheadedness or dizziness; denies visual disturbances; denies loss of balance; denies unilateral weakness.

## 2022-10-08 NOTE — ED NOTES
I-STAT Chem-8+ Results:   Value Reference Range   Sodium 138 136-145 mmol/L   Potassium  4.8 3.5-5.1 mmol/L   Chloride 106  mmol/L   Ionized Calcium 1.18 1.06-1.42 mmol/L   CO2 (measured) 26 23-29 mmol/L   Glucose 99  mg/dL   BUN 22 6-30 mg/dL   Creatinine 0.8 0.5-1.4 mg/dL   Hematocrit 42 36-54%     138

## 2022-10-10 ENCOUNTER — OFFICE VISIT (OUTPATIENT)
Dept: WOUND CARE | Facility: CLINIC | Age: 52
End: 2022-10-10
Payer: MEDICAID

## 2022-10-10 DIAGNOSIS — T81.89XD NON-HEALING SURGICAL WOUND, SUBSEQUENT ENCOUNTER: Primary | ICD-10-CM

## 2022-10-10 DIAGNOSIS — L02.91 ABSCESS: ICD-10-CM

## 2022-10-10 PROCEDURE — 1159F PR MEDICATION LIST DOCUMENTED IN MEDICAL RECORD: ICD-10-PCS | Mod: CPTII,,, | Performed by: CLINICAL NURSE SPECIALIST

## 2022-10-10 PROCEDURE — 99999 PR PBB SHADOW E&M-EST. PATIENT-LVL III: CPT | Mod: PBBFAC,,, | Performed by: CLINICAL NURSE SPECIALIST

## 2022-10-10 PROCEDURE — 99215 PR OFFICE/OUTPT VISIT, EST, LEVL V, 40-54 MIN: ICD-10-PCS | Mod: S$PBB,,, | Performed by: CLINICAL NURSE SPECIALIST

## 2022-10-10 PROCEDURE — 1159F MED LIST DOCD IN RCRD: CPT | Mod: CPTII,,, | Performed by: CLINICAL NURSE SPECIALIST

## 2022-10-10 PROCEDURE — 1160F RVW MEDS BY RX/DR IN RCRD: CPT | Mod: CPTII,,, | Performed by: CLINICAL NURSE SPECIALIST

## 2022-10-10 PROCEDURE — 99215 OFFICE O/P EST HI 40 MIN: CPT | Mod: S$PBB,,, | Performed by: CLINICAL NURSE SPECIALIST

## 2022-10-10 PROCEDURE — 99999 PR PBB SHADOW E&M-EST. PATIENT-LVL III: ICD-10-PCS | Mod: PBBFAC,,, | Performed by: CLINICAL NURSE SPECIALIST

## 2022-10-10 PROCEDURE — 1160F PR REVIEW ALL MEDS BY PRESCRIBER/CLIN PHARMACIST DOCUMENTED: ICD-10-PCS | Mod: CPTII,,, | Performed by: CLINICAL NURSE SPECIALIST

## 2022-10-10 PROCEDURE — 99213 OFFICE O/P EST LOW 20 MIN: CPT | Mod: PBBFAC | Performed by: CLINICAL NURSE SPECIALIST

## 2022-10-10 NOTE — PROGRESS NOTES
Subjective:       Patient ID: Marika Lindsey is a 51 y.o. female.    Chief Complaint: Wound Check    This is another fu for wound eval , this pt  is post colostomy closure  She had temp colostomy after a diverticular perf in January of this year. Last week the old colo site bubbled up and turned dark purple and she went to ED for I AND D   PACKED AND GIVEN ABX.   she also has a NOT YET healed midline wound from that operation in January was going [facility] weekly ( stopped and does not want to go back ) for care and seen by [facility] for MF visits .     Wound Check    Review of Systems   Constitutional:  Negative for activity change, appetite change and fever.   HENT: Negative.     Respiratory: Negative.     Cardiovascular: Negative.    Gastrointestinal:  Negative for constipation and diarrhea.   Genitourinary: Negative.    Musculoskeletal: Negative.    Integumentary:  Positive for wound.        Two open wounds today       Objective:      Physical Exam  Constitutional:       Appearance: Normal appearance.   Pulmonary:      Effort: Pulmonary effort is normal. No respiratory distress.   Abdominal:      General: Bowel sounds are normal. There is no distension.      Palpations: Abdomen is soft.   Skin:     Findings: No erythema.   Neurological:      Mental Status: She is alert.         10/10  THE OLD MIDLINE TUNNEL  Is also improved and only 1 cm deep ( was 4.1)    OLD ostomy site newly drained is 3 cm deep and clean,  packed   Applied DAMIAN to both   WILL CHANGE PLAN OF CARE   Continue with MWF visits  Cleanse with Vashe and allow a soak time as previous  Pack midline wound with DAMIAN  And cover colostomy wound site with DAMIAN  Cover with appropriate cover dressing         FIRST VISIT      7/1/22  Can see old midline wound non healed that is just a tunnel that is 4.1 cm   Has been using alginate per wound center . She is interested in my helping with this wound as well today and make any recs that seem  helpful, she is happy to not go to wound center for now and let me and Dr Garces follow post colostomy closure     The old colostomy site which has erythema apparently drained over night and is not open with pocket to pack  Redness noted and outlined . She was started on antibiotic in past day or so.    Only the 9 o'clock side is open and about 1-2 cm deep      Assessment:       Problem List Items Addressed This Visit          Unprioritized    Abscess     Other Visit Diagnoses       Non-healing surgical wound, subsequent encounter    -  Primary            Plan:       Wound care regimen to be changed and orders sent to [facility]    2 x week nurse visits for care  Cleanse each wound with VASHE solution and allow the vashe to sit in wound for 3-5 min   Then pack with DAMIAN  Cover with appropriate cover dressing     Can replace cover dressing if needed between visits  Ok to shower   To continue WOUND YAN  ( in place of [facility]  Due to cost )  I have reviewed the plan of care with the patient and she express understanding. I spent over 50% of this 40 minute visit in face to face counseling.

## 2022-10-13 LAB
BACTERIA BLD CULT: NORMAL
BACTERIA BLD CULT: NORMAL

## 2022-10-24 ENCOUNTER — PATIENT MESSAGE (OUTPATIENT)
Dept: WOUND CARE | Facility: CLINIC | Age: 52
End: 2022-10-24
Payer: MEDICAID

## 2022-12-13 ENCOUNTER — PATIENT MESSAGE (OUTPATIENT)
Dept: SURGERY | Facility: CLINIC | Age: 52
End: 2022-12-13
Payer: MEDICARE

## 2022-12-20 ENCOUNTER — TELEPHONE (OUTPATIENT)
Dept: SURGERY | Facility: CLINIC | Age: 52
End: 2022-12-20
Payer: MEDICARE

## 2022-12-20 NOTE — TELEPHONE ENCOUNTER
Spoke with patient regarding f/u visit with Dr. Garces for wound assessment. Offered pt to be seen on 12/30 at 0920. Pt verbally confirmed time and location. Denies questions.       ----- Message from TIMOTHY Marte sent at 12/20/2022  4:08 PM CST -----  Regarding: appt with Mili Pittman I think this lady should have at least one look at by Dr Garces as this has been a recurrent issue the whole time she was out on leave,  Your thoughts?  ibis

## 2022-12-26 ENCOUNTER — PATIENT MESSAGE (OUTPATIENT)
Dept: SURGERY | Facility: CLINIC | Age: 52
End: 2022-12-26
Payer: MEDICARE

## 2022-12-29 ENCOUNTER — TELEPHONE (OUTPATIENT)
Dept: SURGERY | Facility: CLINIC | Age: 52
End: 2022-12-29
Payer: MEDICARE

## 2022-12-29 NOTE — PROGRESS NOTES
Colon & Rectal Surgery Clinic Follow Up    HPI:   Marika Lindsey is a 52 y.o. female who presents for follow up after colostomy reversal.  She reports that her ostomy site has healed twice, but about 10 days ago developed an abscess that drained.  She has been performing local wound care since that time.  Denies pain or fevers.        Objective:   Vitals:    12/30/22 0927   BP: (!) 142/74   Pulse: 78   Resp: 19        Physical Exam   Gen: well developed female, NAD   HEENT: normocephalic, atraumatic, PERRL, EOMI   CV: RRR, no murmurs   Resp: nonlabored, CTAB   Abd: midline scar with 1 cm defect with granulation tissue, prior ostomy site with small open wound, + hypertrophic granulation tissue     Assessment and Plan:   Marika Lindsey  is a 52 y.o. female who presents for follow up of chronic abdominal wounds    - We discussed underlying cause of wounds and potential treatments.  We discussed that she may simply have excess scar tissue, a stitch granuloma or an enterocutaneous fistula.  Her wounds are shallow and do not probe.    - We discussed physical/chemical debridement of the granulation tissue vs. Surgical excision.  She will see Alcira to see if there are wound care options.  If she continues to have wound recurrence, will consider CT AP to rule out fistula.    - Follow up TBD after appt with Alcira Garces MD  Staff Surgeon   Colon & Rectal Surgery

## 2022-12-30 ENCOUNTER — OFFICE VISIT (OUTPATIENT)
Dept: SURGERY | Facility: CLINIC | Age: 52
End: 2022-12-30
Payer: MEDICAID

## 2022-12-30 ENCOUNTER — OFFICE VISIT (OUTPATIENT)
Dept: WOUND CARE | Facility: CLINIC | Age: 52
End: 2022-12-30
Payer: MEDICAID

## 2022-12-30 VITALS
BODY MASS INDEX: 44.23 KG/M2 | HEART RATE: 78 BPM | HEIGHT: 64 IN | RESPIRATION RATE: 19 BRPM | OXYGEN SATURATION: 97 % | DIASTOLIC BLOOD PRESSURE: 74 MMHG | WEIGHT: 259.06 LBS | SYSTOLIC BLOOD PRESSURE: 142 MMHG

## 2022-12-30 DIAGNOSIS — L92.9 HYPERGRANULATION: ICD-10-CM

## 2022-12-30 DIAGNOSIS — L08.9 CHRONIC WOUND INFECTION OF ABDOMEN, SEQUELA: Primary | ICD-10-CM

## 2022-12-30 DIAGNOSIS — S31.109S CHRONIC WOUND INFECTION OF ABDOMEN, SEQUELA: Primary | ICD-10-CM

## 2022-12-30 DIAGNOSIS — T81.89XD NON-HEALING SURGICAL WOUND, SUBSEQUENT ENCOUNTER: Primary | ICD-10-CM

## 2022-12-30 PROBLEM — S31.109A CHRONIC WOUND INFECTION OF ABDOMEN: Status: ACTIVE | Noted: 2022-12-30

## 2022-12-30 PROCEDURE — 99999 PR PBB SHADOW E&M-EST. PATIENT-LVL III: CPT | Mod: PBBFAC,,, | Performed by: CLINICAL NURSE SPECIALIST

## 2022-12-30 PROCEDURE — 99499 UNLISTED E&M SERVICE: CPT | Mod: S$PBB,,, | Performed by: CLINICAL NURSE SPECIALIST

## 2022-12-30 PROCEDURE — 1160F RVW MEDS BY RX/DR IN RCRD: CPT | Mod: CPTII,,, | Performed by: CLINICAL NURSE SPECIALIST

## 2022-12-30 PROCEDURE — 1159F PR MEDICATION LIST DOCUMENTED IN MEDICAL RECORD: ICD-10-PCS | Mod: CPTII,,, | Performed by: CLINICAL NURSE SPECIALIST

## 2022-12-30 PROCEDURE — 17250 CHEM CAUT OF GRANLTJ TISSUE: CPT | Mod: S$PBB,,, | Performed by: CLINICAL NURSE SPECIALIST

## 2022-12-30 PROCEDURE — 17250 CHEM CAUT OF GRANLTJ TISSUE: CPT | Mod: PBBFAC | Performed by: CLINICAL NURSE SPECIALIST

## 2022-12-30 PROCEDURE — 99499 NO LOS: ICD-10-PCS | Mod: S$PBB,,, | Performed by: CLINICAL NURSE SPECIALIST

## 2022-12-30 PROCEDURE — 17250 PR CHEM CAUTERY GRANULATN TISSUE: ICD-10-PCS | Mod: S$PBB,,, | Performed by: CLINICAL NURSE SPECIALIST

## 2022-12-30 PROCEDURE — 1160F PR REVIEW ALL MEDS BY PRESCRIBER/CLIN PHARMACIST DOCUMENTED: ICD-10-PCS | Mod: CPTII,,, | Performed by: CLINICAL NURSE SPECIALIST

## 2022-12-30 PROCEDURE — 1159F PR MEDICATION LIST DOCUMENTED IN MEDICAL RECORD: ICD-10-PCS | Mod: CPTII,,, | Performed by: SURGERY

## 2022-12-30 PROCEDURE — 1159F MED LIST DOCD IN RCRD: CPT | Mod: CPTII,,, | Performed by: CLINICAL NURSE SPECIALIST

## 2022-12-30 PROCEDURE — 99999 PR PBB SHADOW E&M-EST. PATIENT-LVL III: CPT | Mod: PBBFAC,,, | Performed by: SURGERY

## 2022-12-30 PROCEDURE — 3077F PR MOST RECENT SYSTOLIC BLOOD PRESSURE >= 140 MM HG: ICD-10-PCS | Mod: CPTII,,, | Performed by: SURGERY

## 2022-12-30 PROCEDURE — 3077F SYST BP >= 140 MM HG: CPT | Mod: CPTII,,, | Performed by: SURGERY

## 2022-12-30 PROCEDURE — 99213 OFFICE O/P EST LOW 20 MIN: CPT | Mod: S$PBB,,, | Performed by: SURGERY

## 2022-12-30 PROCEDURE — 99213 OFFICE O/P EST LOW 20 MIN: CPT | Mod: PBBFAC | Performed by: SURGERY

## 2022-12-30 PROCEDURE — 99213 OFFICE O/P EST LOW 20 MIN: CPT | Mod: PBBFAC,27 | Performed by: CLINICAL NURSE SPECIALIST

## 2022-12-30 PROCEDURE — 99213 PR OFFICE/OUTPT VISIT, EST, LEVL III, 20-29 MIN: ICD-10-PCS | Mod: S$PBB,,, | Performed by: SURGERY

## 2022-12-30 PROCEDURE — 99999 PR PBB SHADOW E&M-EST. PATIENT-LVL III: ICD-10-PCS | Mod: PBBFAC,,, | Performed by: CLINICAL NURSE SPECIALIST

## 2022-12-30 PROCEDURE — 3078F PR MOST RECENT DIASTOLIC BLOOD PRESSURE < 80 MM HG: ICD-10-PCS | Mod: CPTII,,, | Performed by: SURGERY

## 2022-12-30 PROCEDURE — 3008F BODY MASS INDEX DOCD: CPT | Mod: CPTII,,, | Performed by: SURGERY

## 2022-12-30 PROCEDURE — 99999 PR PBB SHADOW E&M-EST. PATIENT-LVL III: ICD-10-PCS | Mod: PBBFAC,,, | Performed by: SURGERY

## 2022-12-30 PROCEDURE — 1159F MED LIST DOCD IN RCRD: CPT | Mod: CPTII,,, | Performed by: SURGERY

## 2022-12-30 PROCEDURE — 3008F PR BODY MASS INDEX (BMI) DOCUMENTED: ICD-10-PCS | Mod: CPTII,,, | Performed by: SURGERY

## 2022-12-30 PROCEDURE — 3078F DIAST BP <80 MM HG: CPT | Mod: CPTII,,, | Performed by: SURGERY

## 2022-12-30 NOTE — PROGRESS NOTES
Subjective:       Patient ID: Marika Lindsey is a 52 y.o. female.    Chief Complaint: Wound Check    This is another fu for wound eval , this pt  is post colostomy closure  She had temp colostomy after a diverticular perf in January of this year. She is 6 months post op and even though old ostomy site was healed , it is now reopened. Both open wounds assessed by Dr Garces today as well    Wound Check    Review of Systems   Constitutional:  Negative for activity change, appetite change and fever.   HENT: Negative.     Respiratory: Negative.     Cardiovascular: Negative.    Gastrointestinal:  Negative for constipation and diarrhea.   Genitourinary: Negative.    Musculoskeletal: Negative.    Integumentary:  Positive for wound.        Two open wounds today       Objective:      Physical Exam  Constitutional:       Appearance: Normal appearance.   Pulmonary:      Effort: Pulmonary effort is normal. No respiratory distress.   Abdominal:      General: Bowel sounds are normal. There is no distension.      Palpations: Abdomen is soft.   Skin:     Findings: No erythema.   Neurological:      Mental Status: She is alert.       12/30 Midline    Old ostomy site   12/30  hypergranulation noted and pt ok with chemical cautery with lidocaine   PROCEDURE:  CHEMICAL CAUTERY: Performed for hypergranulation tissue of left sided wound . The tissue was anesthetized topically with application of Lidocaine gel 2% and 5 minute wait time. The area noted was cauterized with AGNO3. The patient stated pain was 0 on 1-10 scale with this procedure. I was able to cut away the cauterized tissue and left a smooth base and a few mm deep   Covered with  HYDROFERA BLUE READY and paper tape  Instructions given for changing every 1-3 days depending on drainage   ________________________________________________________________________________________________________    10/10  THE OLD MIDLINE TUNNEL  Is also improved and only 1 cm deep ( was 4.1)     OLD ostomy site newly drained is 3 cm deep and clean,  packed   Applied DAMIAN to both   WILL CHANGE PLAN OF CARE   Continue with MWF visits  Cleanse with Vashe and allow a soak time as previous  Pack midline wound with DAMIAN  And cover colostomy wound site with DAMIAN  Cover with appropriate cover dressing   FIRST VISIT      7/1/22  Can see old midline wound non healed that is just a tunnel that is 4.1 cm   Has been using alginate per wound center . She is interested in my helping with this wound as well today and make any recs that seem helpful, she is happy to not go to wound center for now and let me and Dr Garces follow post colostomy closure     The old colostomy site which has erythema apparently drained over night and is not open with pocket to pack  Redness noted and outlined . She was started on antibiotic in past day or so.    Only the 9 o'clock side is open and about 1-2 cm deep      Assessment:       Problem List Items Addressed This Visit    None  Visit Diagnoses       Non-healing surgical wound, subsequent encounter    -  Primary    Hypergranulation                Plan:     Home Health no longer seeing her  She can do own care, supplies given today for starting process and will fu with progress in a week     To continue WOUND AYN  ( in place of [facility]  Due to cost )  I have reviewed the plan of care with the patient and she express understanding. I spent over 50% of this 40 minute visit in face to face counseling.

## 2023-01-06 ENCOUNTER — PATIENT MESSAGE (OUTPATIENT)
Dept: WOUND CARE | Facility: CLINIC | Age: 53
End: 2023-01-06
Payer: MEDICARE

## 2023-01-09 ENCOUNTER — PATIENT MESSAGE (OUTPATIENT)
Dept: WOUND CARE | Facility: CLINIC | Age: 53
End: 2023-01-09
Payer: MEDICARE

## 2023-01-12 ENCOUNTER — OFFICE VISIT (OUTPATIENT)
Dept: WOUND CARE | Facility: CLINIC | Age: 53
End: 2023-01-12
Payer: MEDICAID

## 2023-01-12 DIAGNOSIS — T81.89XD NON-HEALING SURGICAL WOUND, SUBSEQUENT ENCOUNTER: Primary | ICD-10-CM

## 2023-01-12 PROCEDURE — 99213 PR OFFICE/OUTPT VISIT, EST, LEVL III, 20-29 MIN: ICD-10-PCS | Mod: S$PBB,,, | Performed by: CLINICAL NURSE SPECIALIST

## 2023-01-12 PROCEDURE — 1159F PR MEDICATION LIST DOCUMENTED IN MEDICAL RECORD: ICD-10-PCS | Mod: CPTII,,, | Performed by: CLINICAL NURSE SPECIALIST

## 2023-01-12 PROCEDURE — 1159F MED LIST DOCD IN RCRD: CPT | Mod: CPTII,,, | Performed by: CLINICAL NURSE SPECIALIST

## 2023-01-12 PROCEDURE — 99212 OFFICE O/P EST SF 10 MIN: CPT | Mod: PBBFAC | Performed by: CLINICAL NURSE SPECIALIST

## 2023-01-12 PROCEDURE — 99999 PR PBB SHADOW E&M-EST. PATIENT-LVL II: CPT | Mod: PBBFAC,,, | Performed by: CLINICAL NURSE SPECIALIST

## 2023-01-12 PROCEDURE — 99213 OFFICE O/P EST LOW 20 MIN: CPT | Mod: S$PBB,,, | Performed by: CLINICAL NURSE SPECIALIST

## 2023-01-12 PROCEDURE — 1160F PR REVIEW ALL MEDS BY PRESCRIBER/CLIN PHARMACIST DOCUMENTED: ICD-10-PCS | Mod: CPTII,,, | Performed by: CLINICAL NURSE SPECIALIST

## 2023-01-12 PROCEDURE — 1160F RVW MEDS BY RX/DR IN RCRD: CPT | Mod: CPTII,,, | Performed by: CLINICAL NURSE SPECIALIST

## 2023-01-12 PROCEDURE — 99999 PR PBB SHADOW E&M-EST. PATIENT-LVL II: ICD-10-PCS | Mod: PBBFAC,,, | Performed by: CLINICAL NURSE SPECIALIST

## 2023-01-12 NOTE — PROGRESS NOTES
Subjective:       Patient ID: Marika Lindsey is a 52 y.o. female.    Chief Complaint: Wound Check    This is another fu for wound eval , this pt  is post colostomy closure months back  She had temp colostomy after a diverticular perf in January of this year. She is 6 months post op and old ostomy site reopened and she has been requesting help with care of for healing. She went to [facility] for while and DID NOT Like the way things are run there.     Wound Check    Review of Systems   Constitutional:  Negative for activity change, appetite change and fever.   HENT: Negative.     Respiratory: Negative.     Cardiovascular: Negative.    Gastrointestinal:  Negative for constipation and diarrhea.   Genitourinary: Negative.    Musculoskeletal: Negative.    Integumentary:  Positive for wound.        Old ostomy site now HEALED  Midline is concern today       Objective:      Physical Exam  Constitutional:       Appearance: Normal appearance.   Pulmonary:      Effort: Pulmonary effort is normal. No respiratory distress.   Abdominal:      General: Bowel sounds are normal. There is no distension.      Palpations: Abdomen is soft.   Skin:     Findings: No erythema.   Neurological:      Mental Status: She is alert.       12/30 1/12/23  Removed yellow slough and biofilm with guaze and pickups  this is wound clean,   will have her try SanaraRx applied full st daily with qtip to base of wound and cover     12/30 Midline    12/20       Healed last week       Old ostomy site   12/30  hypergranulation noted and pt ok with chemical cautery with lidocaine   PROCEDURE:  CHEMICAL CAUTERY: Performed for hypergranulation tissue of left sided wound . The tissue was anesthetized topically with application of Lidocaine gel 2% and 5 minute wait time. The area noted was cauterized with AGNO3. The patient stated pain was 0 on 1-10 scale with this procedure. I was able to cut away the cauterized tissue and left a smooth base and a  few mm deep   Covered with  HYDROFERA BLUE READY and paper tape  Instructions given for changing every 1-3 days depending on drainage   ________________________________________________________________________________________________________    10/10  THE OLD MIDLINE TUNNEL  Is also improved and only 1 cm deep ( was 4.1)    OLD ostomy site newly drained is 3 cm deep and clean,  packed   Applied DAMIAN to both   WILL CHANGE PLAN OF CARE   Continue with MWF visits  Cleanse with Vashe and allow a soak time as previous  Pack midline wound with DAMIAN  And cover colostomy wound site with DAMIAN  Cover with appropriate cover dressing   FIRST VISIT      7/1/22  Can see old midline wound non healed that is just a tunnel that is 4.1 cm   Has been using alginate per wound center . She is interested in my helping with this wound as well today and make any recs that seem helpful, she is happy to not go to wound center for now and let me and Dr Garces follow post colostomy closure     The old colostomy site which has erythema apparently drained over night and is not open with pocket to pack  Redness noted and outlined . She was started on antibiotic in past day or so.    Only the 9 o'clock side is open and about 1-2 cm deep      Assessment:       Problem List Items Addressed This Visit    None  Visit Diagnoses       Non-healing surgical wound, subsequent encounter    -  Primary            Plan:   Switch to SanarRx  topcially BID for next few weeks to midline   OLD STOMA SITE IS HEALED .  I have reviewed the plan of care with the patient and she express understanding. I spent over 50% of this 20 minute visit in face to face counseling.

## 2023-03-06 ENCOUNTER — PATIENT MESSAGE (OUTPATIENT)
Dept: WOUND CARE | Facility: CLINIC | Age: 53
End: 2023-03-06
Payer: MEDICARE

## 2023-03-08 ENCOUNTER — OFFICE VISIT (OUTPATIENT)
Dept: WOUND CARE | Facility: CLINIC | Age: 53
End: 2023-03-08
Payer: MEDICAID

## 2023-03-08 DIAGNOSIS — L92.9 HYPERGRANULATION: ICD-10-CM

## 2023-03-08 DIAGNOSIS — T81.89XD NON-HEALING SURGICAL WOUND, SUBSEQUENT ENCOUNTER: Primary | ICD-10-CM

## 2023-03-08 PROCEDURE — 99213 OFFICE O/P EST LOW 20 MIN: CPT | Mod: PBBFAC | Performed by: CLINICAL NURSE SPECIALIST

## 2023-03-08 PROCEDURE — 99999 PR PBB SHADOW E&M-EST. PATIENT-LVL III: ICD-10-PCS | Mod: PBBFAC,,, | Performed by: CLINICAL NURSE SPECIALIST

## 2023-03-08 PROCEDURE — 1160F PR REVIEW ALL MEDS BY PRESCRIBER/CLIN PHARMACIST DOCUMENTED: ICD-10-PCS | Mod: CPTII,,, | Performed by: CLINICAL NURSE SPECIALIST

## 2023-03-08 PROCEDURE — 1160F RVW MEDS BY RX/DR IN RCRD: CPT | Mod: CPTII,,, | Performed by: CLINICAL NURSE SPECIALIST

## 2023-03-08 PROCEDURE — 99999 PR PBB SHADOW E&M-EST. PATIENT-LVL III: CPT | Mod: PBBFAC,,, | Performed by: CLINICAL NURSE SPECIALIST

## 2023-03-08 PROCEDURE — 1159F MED LIST DOCD IN RCRD: CPT | Mod: CPTII,,, | Performed by: CLINICAL NURSE SPECIALIST

## 2023-03-08 PROCEDURE — 17250 CHEM CAUT OF GRANLTJ TISSUE: CPT | Mod: PBBFAC | Performed by: CLINICAL NURSE SPECIALIST

## 2023-03-08 PROCEDURE — 99213 OFFICE O/P EST LOW 20 MIN: CPT | Mod: S$PBB,25,, | Performed by: CLINICAL NURSE SPECIALIST

## 2023-03-08 PROCEDURE — 1159F PR MEDICATION LIST DOCUMENTED IN MEDICAL RECORD: ICD-10-PCS | Mod: CPTII,,, | Performed by: CLINICAL NURSE SPECIALIST

## 2023-03-08 PROCEDURE — 17250 CHEM CAUT OF GRANLTJ TISSUE: CPT | Mod: S$PBB,,, | Performed by: CLINICAL NURSE SPECIALIST

## 2023-03-08 PROCEDURE — 17250 PR CHEM CAUTERY GRANULATN TISSUE: ICD-10-PCS | Mod: S$PBB,,, | Performed by: CLINICAL NURSE SPECIALIST

## 2023-03-08 PROCEDURE — 99213 PR OFFICE/OUTPT VISIT, EST, LEVL III, 20-29 MIN: ICD-10-PCS | Mod: S$PBB,25,, | Performed by: CLINICAL NURSE SPECIALIST

## 2023-03-08 NOTE — PROGRESS NOTES
Subjective:       Patient ID: Marika Lindsey is a 52 y.o. female.    Chief Complaint: Wound Check    This is another fu for wound eval , this pt  is post colostomy closure 6 + months back  She had temp colostomy after a diverticular perf in January of this year. She is still having issues with her midline not healing completely, Old colostomy site did heal around [place] time     Wound Check    Review of Systems   Constitutional:  Negative for activity change, appetite change and fever.   HENT: Negative.     Respiratory: Negative.     Cardiovascular: Negative.    Gastrointestinal:  Negative for constipation and diarrhea.   Genitourinary: Negative.    Musculoskeletal: Negative.    Integumentary:  Positive for wound.        Old ostomy site now HEALED  Midline is concern today       Objective:      Physical Exam  Constitutional:       Appearance: Normal appearance.   Pulmonary:      Effort: Pulmonary effort is normal. No respiratory distress.   Abdominal:      General: Bowel sounds are normal. There is no distension.      Palpations: Abdomen is soft.   Skin:     Findings: No erythema.   Neurological:      Mental Status: She is alert.       12/30       1/12/23     3/8/23    PROCEDURE:  CHEMICAL CAUTERY: Performed for hypergranulation tissue of non healing wound. The tissue was not anesthetized topically as the area involved had no feeling/sensation. The area noted was cauterized with AGNO3. The patient had pain of 0 on scale of 1-10 with this procedure.The edges of wound roughed with currette as well.   Will have pt insert ENDOFORM Antimicrobial into the wound every 1-2 days   12/30 Midline    12/20       Healed last week       Old ostomy site   12/30  hypergranulation noted and pt ok with chemical cautery with lidocaine   PROCEDURE:  CHEMICAL CAUTERY: Performed for hypergranulation tissue of left sided wound . The tissue was anesthetized topically with application of Lidocaine gel 2% and 5 minute wait time.  The area noted was cauterized with AGNO3. The patient stated pain was 0 on 1-10 scale with this procedure. I was able to cut away the cauterized tissue and left a smooth base and a few mm deep   Covered with  HYDROFERA BLUE READY and paper tape  Instructions given for changing every 1-3 days depending on drainage   ________________________________________________________________________________________________________    10/10  THE OLD MIDLINE TUNNEL  Is also improved and only 1 cm deep ( was 4.1)    OLD ostomy site newly drained is 3 cm deep and clean,  packed   Applied DAMIAN to both   WILL CHANGE PLAN OF CARE   Continue with MWF visits  Cleanse with Vashe and allow a soak time as previous  Pack midline wound with DAMIAN  And cover colostomy wound site with DAMIAN  Cover with appropriate cover dressing   FIRST VISIT      7/1/22  Can see old midline wound non healed that is just a tunnel that is 4.1 cm   Has been using alginate per wound center . She is interested in my helping with this wound as well today and make any recs that seem helpful, she is happy to not go to wound center for now and let me and Dr Garces follow post colostomy closure   Assessment:       Problem List Items Addressed This Visit    None  Visit Diagnoses       Non-healing surgical wound, subsequent encounter    -  Primary    Hypergranulation                Plan:   Switch to Endoform Ag version in wound every 1-2 days     OLD STOMA SITE IS HEALED .  I have reviewed the plan of care with the patient and she express understanding. I spent over 50% of this 20 minute visit in face to face counseling.

## 2023-04-03 DIAGNOSIS — Z12.31 OTHER SCREENING MAMMOGRAM: Primary | ICD-10-CM

## 2023-04-11 ENCOUNTER — HOSPITAL ENCOUNTER (OUTPATIENT)
Dept: RADIOLOGY | Facility: OTHER | Age: 53
Discharge: HOME | End: 2023-04-11
Attending: NURSE PRACTITIONER
Payer: MEDICAID

## 2023-04-11 DIAGNOSIS — Z12.31 OTHER SCREENING MAMMOGRAM: ICD-10-CM

## 2023-04-11 PROCEDURE — 77067 SCR MAMMO BI INCL CAD: CPT | Mod: TC

## 2023-04-11 PROCEDURE — 77063 MAMMO DIGITAL SCREENING BILAT WITH TOMO: ICD-10-PCS | Mod: 26,,, | Performed by: RADIOLOGY

## 2023-04-11 PROCEDURE — 77067 SCR MAMMO BI INCL CAD: CPT | Mod: 26,,, | Performed by: RADIOLOGY

## 2023-04-11 PROCEDURE — 77067 MAMMO DIGITAL SCREENING BILAT WITH TOMO: ICD-10-PCS | Mod: 26,,, | Performed by: RADIOLOGY

## 2023-04-11 PROCEDURE — 77063 BREAST TOMOSYNTHESIS BI: CPT | Mod: 26,,, | Performed by: RADIOLOGY

## 2023-05-05 ENCOUNTER — OFFICE VISIT (OUTPATIENT)
Dept: ENDOCRINOLOGY | Facility: CLINIC | Age: 53
End: 2023-05-05
Payer: MEDICARE

## 2023-05-05 VITALS
SYSTOLIC BLOOD PRESSURE: 135 MMHG | BODY MASS INDEX: 45.99 KG/M2 | TEMPERATURE: 98 F | HEIGHT: 64 IN | DIASTOLIC BLOOD PRESSURE: 85 MMHG | HEART RATE: 83 BPM | WEIGHT: 269.38 LBS | RESPIRATION RATE: 18 BRPM

## 2023-05-05 DIAGNOSIS — M79.10 MUSCLE PAIN: ICD-10-CM

## 2023-05-05 DIAGNOSIS — M79.605 PAIN IN BOTH LOWER EXTREMITIES: ICD-10-CM

## 2023-05-05 DIAGNOSIS — E66.01 MORBID OBESITY WITH BMI OF 45.0-49.9, ADULT: ICD-10-CM

## 2023-05-05 DIAGNOSIS — E03.9 HYPOTHYROIDISM, UNSPECIFIED TYPE: Primary | ICD-10-CM

## 2023-05-05 DIAGNOSIS — E03.9 ACQUIRED HYPOTHYROIDISM: ICD-10-CM

## 2023-05-05 DIAGNOSIS — E66.01 SEVERE OBESITY (BMI >= 40): ICD-10-CM

## 2023-05-05 DIAGNOSIS — M79.604 PAIN IN BOTH LOWER EXTREMITIES: ICD-10-CM

## 2023-05-05 PROBLEM — M79.606 LEG PAIN: Status: ACTIVE | Noted: 2023-05-05

## 2023-05-05 PROCEDURE — 99204 PR OFFICE/OUTPT VISIT, NEW, LEVL IV, 45-59 MIN: ICD-10-PCS | Mod: S$GLB,,, | Performed by: INTERNAL MEDICINE

## 2023-05-05 PROCEDURE — 99999 PR PBB SHADOW E&M-EST. PATIENT-LVL III: CPT | Mod: PBBFAC,,, | Performed by: INTERNAL MEDICINE

## 2023-05-05 PROCEDURE — 99999 PR PBB SHADOW E&M-EST. PATIENT-LVL III: ICD-10-PCS | Mod: PBBFAC,,, | Performed by: INTERNAL MEDICINE

## 2023-05-05 PROCEDURE — 99204 OFFICE O/P NEW MOD 45 MIN: CPT | Mod: S$GLB,,, | Performed by: INTERNAL MEDICINE

## 2023-05-05 RX ORDER — DEXAMETHASONE 1 MG/1
1 TABLET ORAL ONCE
Qty: 1 TABLET | Refills: 0 | Status: SHIPPED | OUTPATIENT
Start: 2023-05-05 | End: 2023-05-05

## 2023-05-05 NOTE — ASSESSMENT & PLAN NOTE
Check TSH today. She is having a multitude of symptoms, which may or may not be thyroid related. If TSH is high, will go up on the dose to see if it helps.    Reviewed that our goal is a normal TSH. Avoid exogenous hyperthyroidism as this can accelerate bone loss and increase risk of CV complications.

## 2023-05-05 NOTE — ASSESSMENT & PLAN NOTE
Screen for Cushing syndrome with 1 mg DST.  Rechecking thyroid as above.  Check A1c. If she has diabetes, she would be a candidate for a GLP-1 agonist.

## 2023-05-05 NOTE — ASSESSMENT & PLAN NOTE
Unclear etiology, but very unlikely to be related to her thyroid. She is not on a statin currently. Check CK.

## 2023-05-05 NOTE — PROGRESS NOTES
NEW PATIENT VISIT    Subjective:      Chief Complaint: Hypothyroidism, Weight Gain, and Fatigue      HPI: Marika Lindsey is a 52 y.o. female who is here for hypothyroidism      Past Medical History:   Diagnosis Date    Dehisced gastrointestinal anastomosis     Diverticulitis     GERD (gastroesophageal reflux disease)     Thyroid disease     Wound dehiscence          With regards to acquired hypothyroidism:    Diagnosed with hypothyroidism in 3/2021 based on high TSH. Started on 25 mcg levothyroxine. Recent labs through outside provider showed her TSH was still high, so she went up to 50 mcg. She has had a very difficult past couple of years related to abdominal surgeries stemming from a severe episode of diverticulitis. She had an ostomy, which is now reversed. Having trouble with one of the wounds not healing completely.    Frequent awakenings at night. Not sure what wakes her up. Legs hurting at night and during the day.    FH: mom with thyroid problems, grandmother on mom's side, cousin on mom's side.    Current medication:  Generic levothyroxine 50 mcg daily    Takes thyroid medication properly without food first thing in the morning     Current symptoms:     No   Yes  []    [x]   Weight gain  []    [x]   Fatigue  []    [x]   Constipation  []    [x]   Hair loss  [x]    []   Brittle nails  []    [x]   Mental fog  [x]    []   Cold intolerance      No results found for: TSH, FREET4, T3FREE, N5VDAIF, LABTHYR, THYROIDAB, THGABSCRN, THYROGLB, THYROPEROXID        Reviewed past medical, family, social history and updated as appropriate.    Objective:     Vitals:    05/05/23 1259   BP: 135/85   Pulse: 83   Resp: 18   Temp: 97.9 °F (36.6 °C)         BP Readings from Last 5 Encounters:   05/05/23 135/85   12/30/22 (!) 142/74   10/08/22 (!) 121/95   07/15/22 (!) 150/73   07/01/22 (!) 169/85         Physical Exam  Vitals and nursing note reviewed.   Constitutional:       Appearance: She is well-developed. She is  obese.   HENT:      Right Ear: External ear normal.      Left Ear: External ear normal.      Nose: Nose normal.   Eyes:      General:         Right eye: No discharge.         Left eye: No discharge.      Conjunctiva/sclera: Conjunctivae normal.   Neck:      Thyroid: No thyroid mass, thyromegaly or thyroid tenderness.      Trachea: No tracheal deviation.   Cardiovascular:      Rate and Rhythm: Normal rate.      Heart sounds: No murmur heard.  Pulmonary:      Effort: Pulmonary effort is normal.      Breath sounds: Normal breath sounds.   Abdominal:      Palpations: Abdomen is soft. There is no mass.      Tenderness: There is no abdominal tenderness.   Musculoskeletal:      Comments: No digital clubbing or extremity cyanosis   Skin:     Findings: No rash.      Comments: No subcutaneous nodules noted.   Neurological:      Mental Status: She is alert and oriented to person, place, and time.      Coordination: Coordination normal.   Psychiatric:         Mood and Affect: Mood normal.         Behavior: Behavior normal.         Thought Content: Thought content normal.      Comments: Alert and oriented to person, place, and situation.         Wt Readings from Last 50 Encounters:   05/05/23 122.2 kg (269 lb 6.4 oz)   12/30/22 117.5 kg (259 lb 0.7 oz)   10/08/22 113.4 kg (250 lb)   07/15/22 112.4 kg (247 lb 12.8 oz)   07/01/22 109.7 kg (241 lb 13.5 oz)   06/24/22 110.3 kg (243 lb 2.7 oz)   06/15/22 110.5 kg (243 lb 9.7 oz)   06/07/22 111.1 kg (245 lb)   06/06/22 111.9 kg (246 lb 11.1 oz)   05/13/22 110.3 kg (243 lb 2.7 oz)         Assessment/Plan:     Acquired hypothyroidism  Check TSH today. She is having a multitude of symptoms, which may or may not be thyroid related. If TSH is high, will go up on the dose to see if it helps.    Reviewed that our goal is a normal TSH. Avoid exogenous hyperthyroidism as this can accelerate bone loss and increase risk of CV complications.    Morbid obesity with BMI of 45.0-49.9, adult  Screen  for Cushing syndrome with 1 mg DST.  Rechecking thyroid as above.  Check A1c. If she has diabetes, she would be a candidate for a GLP-1 agonist.    Leg pain  Unclear etiology, but very unlikely to be related to her thyroid. She is not on a statin currently. Check CK.      Follow up in about 4 months (around 9/5/2023).

## 2023-05-08 ENCOUNTER — LAB VISIT (OUTPATIENT)
Dept: LAB | Facility: OTHER | Age: 53
End: 2023-05-08
Attending: INTERNAL MEDICINE
Payer: MEDICARE

## 2023-05-08 ENCOUNTER — PATIENT MESSAGE (OUTPATIENT)
Dept: ENDOCRINOLOGY | Facility: CLINIC | Age: 53
End: 2023-05-08
Payer: MEDICARE

## 2023-05-08 DIAGNOSIS — M79.10 MUSCLE PAIN: ICD-10-CM

## 2023-05-08 DIAGNOSIS — E03.9 HYPOTHYROIDISM, UNSPECIFIED TYPE: ICD-10-CM

## 2023-05-08 DIAGNOSIS — E66.01 SEVERE OBESITY (BMI >= 40): ICD-10-CM

## 2023-05-08 LAB
ALBUMIN SERPL BCP-MCNC: 4.2 G/DL (ref 3.5–5.2)
ALP SERPL-CCNC: 54 U/L (ref 55–135)
ALT SERPL W/O P-5'-P-CCNC: 21 U/L (ref 10–44)
ANION GAP SERPL CALC-SCNC: 9 MMOL/L (ref 8–16)
AST SERPL-CCNC: 18 U/L (ref 10–40)
BASOPHILS # BLD AUTO: 0.02 K/UL (ref 0–0.2)
BASOPHILS NFR BLD: 0.3 % (ref 0–1.9)
BILIRUB SERPL-MCNC: 0.3 MG/DL (ref 0.1–1)
BUN SERPL-MCNC: 16 MG/DL (ref 6–20)
CALCIUM SERPL-MCNC: 10.1 MG/DL (ref 8.7–10.5)
CHLORIDE SERPL-SCNC: 104 MMOL/L (ref 95–110)
CK SERPL-CCNC: 82 U/L (ref 20–180)
CO2 SERPL-SCNC: 26 MMOL/L (ref 23–29)
CORTIS SERPL-MCNC: <1 UG/DL (ref 4.3–22.4)
CREAT SERPL-MCNC: 0.9 MG/DL (ref 0.5–1.4)
DIFFERENTIAL METHOD: ABNORMAL
EOSINOPHIL # BLD AUTO: 0.2 K/UL (ref 0–0.5)
EOSINOPHIL NFR BLD: 2.6 % (ref 0–8)
ERYTHROCYTE [DISTWIDTH] IN BLOOD BY AUTOMATED COUNT: 13.2 % (ref 11.5–14.5)
EST. GFR  (NO RACE VARIABLE): >60 ML/MIN/1.73 M^2
ESTIMATED AVG GLUCOSE: 100 MG/DL (ref 68–131)
GLUCOSE SERPL-MCNC: 136 MG/DL (ref 70–110)
HBA1C MFR BLD: 5.1 % (ref 4–5.6)
HCT VFR BLD AUTO: 44.5 % (ref 37–48.5)
HGB BLD-MCNC: 14.7 G/DL (ref 12–16)
IMM GRANULOCYTES # BLD AUTO: 0.02 K/UL (ref 0–0.04)
IMM GRANULOCYTES NFR BLD AUTO: 0.3 % (ref 0–0.5)
LYMPHOCYTES # BLD AUTO: 1.9 K/UL (ref 1–4.8)
LYMPHOCYTES NFR BLD: 27 % (ref 18–48)
MCH RBC QN AUTO: 30.8 PG (ref 27–31)
MCHC RBC AUTO-ENTMCNC: 33 G/DL (ref 32–36)
MCV RBC AUTO: 93 FL (ref 82–98)
MONOCYTES # BLD AUTO: 0.2 K/UL (ref 0.3–1)
MONOCYTES NFR BLD: 2.8 % (ref 4–15)
NEUTROPHILS # BLD AUTO: 4.6 K/UL (ref 1.8–7.7)
NEUTROPHILS NFR BLD: 67 % (ref 38–73)
NRBC BLD-RTO: 0 /100 WBC
PLATELET # BLD AUTO: 302 K/UL (ref 150–450)
PMV BLD AUTO: 10.4 FL (ref 9.2–12.9)
POTASSIUM SERPL-SCNC: 4.4 MMOL/L (ref 3.5–5.1)
PROT SERPL-MCNC: 7.4 G/DL (ref 6–8.4)
RBC # BLD AUTO: 4.78 M/UL (ref 4–5.4)
SODIUM SERPL-SCNC: 139 MMOL/L (ref 136–145)
T4 FREE SERPL-MCNC: 1.06 NG/DL (ref 0.71–1.51)
THYROPEROXIDASE IGG SERPL-ACNC: 419.9 IU/ML
TSH SERPL DL<=0.005 MIU/L-ACNC: 1.77 UIU/ML (ref 0.4–4)
WBC # BLD AUTO: 6.85 K/UL (ref 3.9–12.7)

## 2023-05-08 PROCEDURE — 82550 ASSAY OF CK (CPK): CPT | Performed by: INTERNAL MEDICINE

## 2023-05-08 PROCEDURE — 80053 COMPREHEN METABOLIC PANEL: CPT | Performed by: INTERNAL MEDICINE

## 2023-05-08 PROCEDURE — 86376 MICROSOMAL ANTIBODY EACH: CPT | Performed by: INTERNAL MEDICINE

## 2023-05-08 PROCEDURE — 82533 TOTAL CORTISOL: CPT | Performed by: INTERNAL MEDICINE

## 2023-05-08 PROCEDURE — 83036 HEMOGLOBIN GLYCOSYLATED A1C: CPT | Performed by: INTERNAL MEDICINE

## 2023-05-08 PROCEDURE — 82542 COL CHROMOTOGRAPHY QUAL/QUAN: CPT | Performed by: INTERNAL MEDICINE

## 2023-05-08 PROCEDURE — 84439 ASSAY OF FREE THYROXINE: CPT | Performed by: INTERNAL MEDICINE

## 2023-05-08 PROCEDURE — 84443 ASSAY THYROID STIM HORMONE: CPT | Performed by: INTERNAL MEDICINE

## 2023-05-08 PROCEDURE — 85025 COMPLETE CBC W/AUTO DIFF WBC: CPT | Performed by: INTERNAL MEDICINE

## 2023-05-12 ENCOUNTER — PATIENT MESSAGE (OUTPATIENT)
Dept: ENDOCRINOLOGY | Facility: CLINIC | Age: 53
End: 2023-05-12
Payer: MEDICARE

## 2023-05-18 LAB — DEXAMETHASONE SERPL-MCNC: 144 NG/DL

## 2023-05-20 ENCOUNTER — PATIENT MESSAGE (OUTPATIENT)
Dept: ENDOCRINOLOGY | Facility: CLINIC | Age: 53
End: 2023-05-20
Payer: MEDICARE

## 2023-05-20 DIAGNOSIS — E03.9 HYPOTHYROIDISM, UNSPECIFIED TYPE: Primary | ICD-10-CM

## 2023-05-22 RX ORDER — THYROID 30 MG/1
30 TABLET ORAL
Qty: 30 TABLET | Refills: 11 | Status: SHIPPED | OUTPATIENT
Start: 2023-05-22 | End: 2023-07-21 | Stop reason: SDUPTHER

## 2023-05-22 NOTE — TELEPHONE ENCOUNTER
Lab Results   Component Value Date    TSH 1.769 05/08/2023    FREET4 1.06 05/08/2023       Reviewed labs.  Current dose of thyroid hormone is Generic levothyroxine 50 mcg daily    She would like to try [facility].    Change to [facility] thyroid 30 mg daily    Recheck labs in 6 weeks    Please schedule labs in 6 weeks

## 2023-05-23 ENCOUNTER — PATIENT MESSAGE (OUTPATIENT)
Dept: SURGERY | Facility: CLINIC | Age: 53
End: 2023-05-23
Payer: MEDICARE

## 2023-06-02 ENCOUNTER — OFFICE VISIT (OUTPATIENT)
Dept: SURGERY | Facility: CLINIC | Age: 53
End: 2023-06-02
Payer: MEDICAID

## 2023-06-02 VITALS
SYSTOLIC BLOOD PRESSURE: 131 MMHG | HEART RATE: 78 BPM | BODY MASS INDEX: 45.57 KG/M2 | DIASTOLIC BLOOD PRESSURE: 64 MMHG | WEIGHT: 265.5 LBS

## 2023-06-02 DIAGNOSIS — K64.8 INTERNAL HEMORRHOIDS: Primary | ICD-10-CM

## 2023-06-02 PROCEDURE — 99213 OFFICE O/P EST LOW 20 MIN: CPT | Mod: S$GLB,,, | Performed by: SURGERY

## 2023-06-02 PROCEDURE — 99999 PR PBB SHADOW E&M-EST. PATIENT-LVL I: CPT | Mod: PBBFAC,,, | Performed by: SURGERY

## 2023-06-02 PROCEDURE — 99213 PR OFFICE/OUTPT VISIT, EST, LEVL III, 20-29 MIN: ICD-10-PCS | Mod: S$GLB,,, | Performed by: SURGERY

## 2023-06-02 PROCEDURE — 99999 PR PBB SHADOW E&M-EST. PATIENT-LVL I: ICD-10-PCS | Mod: PBBFAC,,, | Performed by: SURGERY

## 2023-06-02 RX ORDER — HYDROCORTISONE ACETATE 25 MG/1
25 SUPPOSITORY RECTAL 2 TIMES DAILY
Qty: 20 SUPPOSITORY | Refills: 0 | Status: SHIPPED | OUTPATIENT
Start: 2023-06-02 | End: 2023-06-12

## 2023-06-02 NOTE — PROGRESS NOTES
[facility]al [facility] Follow Up    HPI:   Marika Lindsey is a 52 y.o. female with history of exploratory laparotomy and michelle's procedure (Ana María) followed by robotic colostomy closure ([facility]) presents with new onset blood per rectum.  Reports painless bright red blood per rectum in the toilet bowl as well as darker bowel movements.  Has been taking NSAIDs due to generalized inflammation that she feels in her body.  + Weight gain.  Also reports fecal urgency and spasm with bowel movements. Taking fiber supplement.       Objective:   Vitals:    06/02/23 1324   BP: 131/64   Pulse: 78        Physical Exam   Gen: well developed female, NAD  HEENT: normocephalic, atraumatic, PERRL, EOM I  CV: RRR, no murmurs  Resp: nonlabored, CTAB   Abd: soft, incisions well approximated   MSK: no gross deformities, no cyanosis or edema   Anorectal: no external masses or lesions, AARON without masses    Anoscopy:   Verbal consent obtained.  A lubricated anoscope was inserted into the anal canal and circumferential inspection performed.  Patient noted to have internal hemorrhoids, right lateral internal hemorrhoid with mild oozing. The patient tolerated the procedure well and the scope was withdrawn.     Assessment and Plan:   Marika Lindsey  is a 52 y.o. female who presents for follow up of rectal bleeding and changes in bowel habits    - Patient with mild oozing from hemorrhoid.  Will try short course of steroid suppositories.  Change fiber supplement to citrucel or fibercon due to looser bowel movements and urgency   - follow up in 2 weeks if no improvement   - will start abdominal binder for abdominal discomfort and additional support  - OTC calmoseptine for external irritation BID as needed.       Adelina Garces MD  Staff Surgeon   Colon & Rectal Surgery

## 2023-06-05 ENCOUNTER — PATIENT MESSAGE (OUTPATIENT)
Dept: SURGERY | Facility: CLINIC | Age: 53
End: 2023-06-05
Payer: MEDICAID

## 2023-06-05 DIAGNOSIS — R10.13 EPIGASTRIC PAIN: Primary | ICD-10-CM

## 2023-06-05 RX ORDER — HYDROCORTISONE 25 MG/G
CREAM TOPICAL 2 TIMES DAILY
Qty: 28 G | Refills: 3 | Status: SHIPPED | OUTPATIENT
Start: 2023-06-05 | End: 2024-01-08

## 2023-06-12 ENCOUNTER — TELEPHONE (OUTPATIENT)
Dept: SURGERY | Facility: CLINIC | Age: 53
End: 2023-06-12
Payer: MEDICAID

## 2023-06-12 ENCOUNTER — HOSPITAL ENCOUNTER (OUTPATIENT)
Dept: RADIOLOGY | Facility: OTHER | Age: 53
Discharge: HOME | End: 2023-06-12
Attending: SURGERY
Payer: MEDICAID

## 2023-06-12 DIAGNOSIS — R10.13 EPIGASTRIC PAIN: ICD-10-CM

## 2023-06-12 PROCEDURE — 74177 CT ABDOMEN PELVIS WITH CONTRAST: ICD-10-PCS | Mod: 26,,, | Performed by: RADIOLOGY

## 2023-06-12 PROCEDURE — 25500020 PHARM REV CODE 255: Performed by: SURGERY

## 2023-06-12 PROCEDURE — 74177 CT ABD & PELVIS W/CONTRAST: CPT | Mod: 26,,, | Performed by: RADIOLOGY

## 2023-06-12 PROCEDURE — 74177 CT ABD & PELVIS W/CONTRAST: CPT | Mod: TC

## 2023-06-12 PROCEDURE — A9698 NON-RAD CONTRAST MATERIALNOC: HCPCS | Performed by: SURGERY

## 2023-06-12 RX ORDER — CIPROFLOXACIN 500 MG/1
500 TABLET ORAL EVERY 12 HOURS
Qty: 20 TABLET | Refills: 0 | Status: ON HOLD | OUTPATIENT
Start: 2023-06-12 | End: 2023-10-20 | Stop reason: HOSPADM

## 2023-06-12 RX ORDER — METRONIDAZOLE 500 MG/1
500 TABLET ORAL EVERY 8 HOURS
Qty: 30 TABLET | Refills: 0 | Status: ON HOLD | OUTPATIENT
Start: 2023-06-12 | End: 2023-10-20 | Stop reason: HOSPADM

## 2023-06-12 RX ADMIN — IOHEXOL 100 ML: 350 INJECTION, SOLUTION INTRAVENOUS at 02:06

## 2023-06-12 RX ADMIN — IOHEXOL 1000 ML: 12 SOLUTION ORAL at 02:06

## 2023-06-12 NOTE — TELEPHONE ENCOUNTER
Called Ms. Lindsey to discuss results of CT showing proctitis and need for oral antibiotics.  Will attempt to call again later.     Adelina Garces MD  Staff Surgeon   Colon & Rectal Surgery

## 2023-07-05 ENCOUNTER — LAB VISIT (OUTPATIENT)
Dept: LAB | Facility: OTHER | Age: 53
End: 2023-07-05
Payer: MEDICAID

## 2023-07-05 ENCOUNTER — PATIENT MESSAGE (OUTPATIENT)
Dept: ENDOCRINOLOGY | Facility: CLINIC | Age: 53
End: 2023-07-05
Payer: MEDICAID

## 2023-07-05 DIAGNOSIS — E03.9 HYPOTHYROIDISM, UNSPECIFIED TYPE: ICD-10-CM

## 2023-07-05 LAB — TSH SERPL DL<=0.005 MIU/L-ACNC: 1.88 UIU/ML (ref 0.4–4)

## 2023-07-05 PROCEDURE — 36415 COLL VENOUS BLD VENIPUNCTURE: CPT | Performed by: INTERNAL MEDICINE

## 2023-07-05 PROCEDURE — 84443 ASSAY THYROID STIM HORMONE: CPT | Performed by: INTERNAL MEDICINE

## 2023-07-21 ENCOUNTER — PATIENT MESSAGE (OUTPATIENT)
Dept: ENDOCRINOLOGY | Facility: CLINIC | Age: 53
End: 2023-07-21
Payer: MEDICAID

## 2023-07-21 DIAGNOSIS — E03.9 HYPOTHYROIDISM, UNSPECIFIED TYPE: ICD-10-CM

## 2023-07-21 RX ORDER — THYROID 30 MG/1
30 TABLET ORAL
Qty: 90 TABLET | Refills: 3 | Status: SHIPPED | OUTPATIENT
Start: 2023-07-21 | End: 2023-07-24 | Stop reason: ALTCHOICE

## 2023-07-24 ENCOUNTER — PATIENT MESSAGE (OUTPATIENT)
Dept: ENDOCRINOLOGY | Facility: CLINIC | Age: 53
End: 2023-07-24
Payer: MEDICAID

## 2023-07-24 DIAGNOSIS — E03.9 HYPOTHYROIDISM, UNSPECIFIED TYPE: Primary | ICD-10-CM

## 2023-07-24 RX ORDER — LEVOTHYROXINE SODIUM 50 UG/1
50 TABLET ORAL
Qty: 90 TABLET | Refills: 3 | Status: SHIPPED | OUTPATIENT
Start: 2023-07-24 | End: 2023-07-31 | Stop reason: SDUPTHER

## 2023-07-31 ENCOUNTER — PATIENT MESSAGE (OUTPATIENT)
Dept: ENDOCRINOLOGY | Facility: CLINIC | Age: 53
End: 2023-07-31
Payer: MEDICAID

## 2023-07-31 DIAGNOSIS — E03.9 HYPOTHYROIDISM, UNSPECIFIED TYPE: ICD-10-CM

## 2023-07-31 RX ORDER — LEVOTHYROXINE SODIUM 50 UG/1
50 TABLET ORAL
Qty: 90 TABLET | Refills: 3 | Status: CANCELLED | OUTPATIENT
Start: 2023-07-31

## 2023-07-31 RX ORDER — LEVOTHYROXINE SODIUM 50 UG/1
50 TABLET ORAL
Qty: 90 TABLET | Refills: 3 | Status: SHIPPED | OUTPATIENT
Start: 2023-07-31

## 2023-10-16 ENCOUNTER — NURSE TRIAGE (OUTPATIENT)
Dept: ADMINISTRATIVE | Facility: CLINIC | Age: 53
End: 2023-10-16
Payer: MEDICAID

## 2023-10-16 NOTE — TELEPHONE ENCOUNTER
Patient states c/o fever, fatigue, RLQ abdominal pain when standing and lump/bulge at RLQ. Patient states bulge is at the site of of an old bowel resection scar from procedure approximately one (1) year ago.  Patient rates abdominal pain 5-6/10 that is sharp and goes away quickly.     Patient [facility]. Patient states understanding of care advice.     Reason for Disposition   MODERATE weakness (i.e., interferes with work, school, normal activities) and cause unknown (Exceptions: Weakness from acute minor illness or from poor fluid intake; weakness is chronic and not worse.)    Additional Information   Negative: SEVERE difficulty breathing (e.g., struggling for each breath, speaks in single words)   Negative: Shock suspected (e.g., cold/pale/clammy skin, too weak to stand, low BP, rapid pulse)   Negative: Difficult to awaken or acting confused (e.g., disoriented, slurred speech)   Negative: Fainted > 15 minutes ago and still feels too weak or dizzy to stand   Negative: SEVERE weakness (i.e., unable to walk or barely able to walk, requires support) and new-onset or worsening   Negative: Sounds like a life-threatening emergency to the triager   Negative: Weakness of the face, arm or leg on one side of the body   Negative: Has diabetes mellitus and weakness from low blood sugar (i.e., < 60 mg/dL or 3.5 mmol/L)   Negative: Recent heat exposure, suspected cause of weakness   Negative: Vomiting is main symptom   Negative: Diarrhea is main symptom   Negative: Difficulty breathing   Negative: Heart beating < 50 beats per minute OR > 140 beats per minute   Negative: Extra heartbeats, irregular heart beating, or heart is beating very fast (i.e., 'palpitations')   Negative: Follows large amount of bleeding (e.g., from vomiting, rectum, vagina) (Exception: Small transient weakness from sight of a small amount blood.)   Negative: Black or tarry bowel movements   Negative:  MODERATE weakness or fatigue from poor fluid intake with no improvement after 2 hours of rest and fluids   Negative: Drinking very little and dehydration suspected (e.g., no urine > 12 hours, very dry mouth, very lightheaded)   Negative: Patient sounds very sick or weak to the triager    Protocols used: Weakness (Generalized) and Fatigue-A-OH

## 2023-10-18 ENCOUNTER — HOSPITAL ENCOUNTER (INPATIENT)
Facility: HOSPITAL | Age: 53
LOS: 2 days | Discharge: HOME | DRG: 603 | End: 2023-10-20
Attending: EMERGENCY MEDICINE | Admitting: SURGERY
Payer: MEDICAID

## 2023-10-18 DIAGNOSIS — Z87.19 HX OF DIVERTICULITIS OF COLON: ICD-10-CM

## 2023-10-18 DIAGNOSIS — R18.8 ABDOMINAL WALL FLUID COLLECTIONS: Primary | ICD-10-CM

## 2023-10-18 PROBLEM — R92.8 ABNORMAL MAMMOGRAM OF BOTH BREASTS: Status: ACTIVE | Noted: 2021-04-08

## 2023-10-18 PROBLEM — R87.810 CERVICAL HIGH RISK HUMAN PAPILLOMAVIRUS (HPV) DNA TEST POSITIVE: Status: ACTIVE | Noted: 2021-05-05

## 2023-10-18 PROBLEM — E66.09 CLASS 2 OBESITY DUE TO EXCESS CALORIES WITH BODY MASS INDEX (BMI) OF 35.0 TO 35.9 IN ADULT: Status: ACTIVE | Noted: 2021-03-08

## 2023-10-18 PROBLEM — S68.119A TRAUMATIC AMPUTATION OF FINGERTIP: Status: ACTIVE | Noted: 2021-04-26

## 2023-10-18 PROBLEM — E66.812 CLASS 2 OBESITY DUE TO EXCESS CALORIES WITH BODY MASS INDEX (BMI) OF 35.0 TO 35.9 IN ADULT: Status: ACTIVE | Noted: 2021-03-08

## 2023-10-18 PROBLEM — L98.9 SKIN LESION: Status: ACTIVE | Noted: 2021-04-26

## 2023-10-18 PROBLEM — R79.89 ABNORMAL TSH: Status: ACTIVE | Noted: 2021-05-05

## 2023-10-18 PROBLEM — N60.02 CYST OF LEFT BREAST: Status: ACTIVE | Noted: 2021-04-22

## 2023-10-18 PROBLEM — R87.615 UNSATISFACTORY CERVICAL PAPANICOLAOU SMEAR: Status: ACTIVE | Noted: 2021-05-05

## 2023-10-18 PROBLEM — K57.32 DIVERTICULITIS LARGE INTESTINE: Status: ACTIVE | Noted: 2022-01-19

## 2023-10-18 PROBLEM — A41.9 SEPSIS: Status: ACTIVE | Noted: 2022-01-21

## 2023-10-18 PROBLEM — K63.1 PERFORATED BOWEL: Status: ACTIVE | Noted: 2022-01-21

## 2023-10-18 PROBLEM — E78.00 PURE HYPERCHOLESTEROLEMIA: Status: ACTIVE | Noted: 2021-03-11

## 2023-10-18 PROBLEM — D22.9 ATYPICAL MOLE: Status: ACTIVE | Noted: 2021-04-26

## 2023-10-18 PROBLEM — R65.10 SIRS (SYSTEMIC INFLAMMATORY RESPONSE SYNDROME): Status: ACTIVE | Noted: 2022-02-15

## 2023-10-18 PROBLEM — K57.20 DIVERTICULITIS OF COLON WITH PERFORATION: Status: ACTIVE | Noted: 2022-01-21

## 2023-10-18 LAB
ALBUMIN SERPL BCP-MCNC: 4 G/DL (ref 3.5–5.2)
ALP SERPL-CCNC: 77 U/L (ref 55–135)
ALT SERPL W/O P-5'-P-CCNC: 15 U/L (ref 10–44)
ANION GAP SERPL CALC-SCNC: 10 MMOL/L (ref 8–16)
AST SERPL-CCNC: 18 U/L (ref 10–40)
BACTERIA #/AREA URNS AUTO: ABNORMAL /HPF
BASOPHILS # BLD AUTO: 0.03 K/UL (ref 0–0.2)
BASOPHILS NFR BLD: 0.3 % (ref 0–1.9)
BILIRUB SERPL-MCNC: 0.6 MG/DL (ref 0.1–1)
BILIRUB UR QL STRIP: NEGATIVE
BUN SERPL-MCNC: 15 MG/DL (ref 6–20)
CALCIUM SERPL-MCNC: 10.1 MG/DL (ref 8.7–10.5)
CHLORIDE SERPL-SCNC: 105 MMOL/L (ref 95–110)
CLARITY UR REFRACT.AUTO: ABNORMAL
CO2 SERPL-SCNC: 22 MMOL/L (ref 23–29)
COLOR UR AUTO: YELLOW
CREAT SERPL-MCNC: 0.9 MG/DL (ref 0.5–1.4)
CRP SERPL-MCNC: 98.1 MG/L (ref 0–8.2)
DIFFERENTIAL METHOD: NORMAL
EOSINOPHIL # BLD AUTO: 0.2 K/UL (ref 0–0.5)
EOSINOPHIL NFR BLD: 1.5 % (ref 0–8)
ERYTHROCYTE [DISTWIDTH] IN BLOOD BY AUTOMATED COUNT: 12.7 % (ref 11.5–14.5)
EST. GFR  (NO RACE VARIABLE): >60 ML/MIN/1.73 M^2
GLUCOSE SERPL-MCNC: 106 MG/DL (ref 70–110)
GLUCOSE UR QL STRIP: NEGATIVE
HCT VFR BLD AUTO: 42.3 % (ref 37–48.5)
HGB BLD-MCNC: 14 G/DL (ref 12–16)
HGB UR QL STRIP: ABNORMAL
IMM GRANULOCYTES # BLD AUTO: 0.03 K/UL (ref 0–0.04)
IMM GRANULOCYTES NFR BLD AUTO: 0.3 % (ref 0–0.5)
INR PPP: 1 (ref 0.8–1.2)
KETONES UR QL STRIP: NEGATIVE
LACTATE SERPL-SCNC: 1.1 MMOL/L (ref 0.5–2.2)
LEUKOCYTE ESTERASE UR QL STRIP: ABNORMAL
LIPASE SERPL-CCNC: 25 U/L (ref 4–60)
LYMPHOCYTES # BLD AUTO: 2 K/UL (ref 1–4.8)
LYMPHOCYTES NFR BLD: 18.6 % (ref 18–48)
MCH RBC QN AUTO: 29.4 PG (ref 27–31)
MCHC RBC AUTO-ENTMCNC: 33.1 G/DL (ref 32–36)
MCV RBC AUTO: 89 FL (ref 82–98)
MICROSCOPIC COMMENT: ABNORMAL
MONOCYTES # BLD AUTO: 0.9 K/UL (ref 0.3–1)
MONOCYTES NFR BLD: 8.3 % (ref 4–15)
NEUTROPHILS # BLD AUTO: 7.7 K/UL (ref 1.8–7.7)
NEUTROPHILS NFR BLD: 71 % (ref 38–73)
NITRITE UR QL STRIP: POSITIVE
NON-SQ EPI CELLS #/AREA URNS AUTO: 5 /HPF
NRBC BLD-RTO: 0 /100 WBC
PH UR STRIP: 5 [PH] (ref 5–8)
PLATELET # BLD AUTO: 315 K/UL (ref 150–450)
PMV BLD AUTO: 9.9 FL (ref 9.2–12.9)
POTASSIUM SERPL-SCNC: 4.4 MMOL/L (ref 3.5–5.1)
PROT SERPL-MCNC: 8 G/DL (ref 6–8.4)
PROT UR QL STRIP: ABNORMAL
PROTHROMBIN TIME: 10.8 SEC (ref 9–12.5)
RBC # BLD AUTO: 4.76 M/UL (ref 4–5.4)
RBC #/AREA URNS AUTO: 10 /HPF (ref 0–4)
SODIUM SERPL-SCNC: 137 MMOL/L (ref 136–145)
SP GR UR STRIP: 1.03 (ref 1–1.03)
SQUAMOUS #/AREA URNS AUTO: 13 /HPF
URN SPEC COLLECT METH UR: ABNORMAL
WBC # BLD AUTO: 10.78 K/UL (ref 3.9–12.7)
WBC #/AREA URNS AUTO: >100 /HPF (ref 0–5)

## 2023-10-18 PROCEDURE — 80053 COMPREHEN METABOLIC PANEL: CPT | Performed by: PHYSICIAN ASSISTANT

## 2023-10-18 PROCEDURE — 85610 PROTHROMBIN TIME: CPT | Performed by: SURGERY

## 2023-10-18 PROCEDURE — 96361 HYDRATE IV INFUSION ADD-ON: CPT

## 2023-10-18 PROCEDURE — 87186 SC STD MICRODIL/AGAR DIL: CPT | Performed by: PHYSICIAN ASSISTANT

## 2023-10-18 PROCEDURE — 85025 COMPLETE CBC W/AUTO DIFF WBC: CPT | Performed by: PHYSICIAN ASSISTANT

## 2023-10-18 PROCEDURE — 63600175 PHARM REV CODE 636 W HCPCS: Performed by: PHYSICIAN ASSISTANT

## 2023-10-18 PROCEDURE — 87077 CULTURE AEROBIC IDENTIFY: CPT | Performed by: PHYSICIAN ASSISTANT

## 2023-10-18 PROCEDURE — 99285 EMERGENCY DEPT VISIT HI MDM: CPT | Mod: 25

## 2023-10-18 PROCEDURE — 83690 ASSAY OF LIPASE: CPT | Performed by: PHYSICIAN ASSISTANT

## 2023-10-18 PROCEDURE — 86140 C-REACTIVE PROTEIN: CPT | Performed by: PHYSICIAN ASSISTANT

## 2023-10-18 PROCEDURE — 87040 BLOOD CULTURE FOR BACTERIA: CPT | Performed by: PHYSICIAN ASSISTANT

## 2023-10-18 PROCEDURE — 87086 URINE CULTURE/COLONY COUNT: CPT | Performed by: PHYSICIAN ASSISTANT

## 2023-10-18 PROCEDURE — 83605 ASSAY OF LACTIC ACID: CPT | Performed by: PHYSICIAN ASSISTANT

## 2023-10-18 PROCEDURE — 12000002 HC ACUTE/MED SURGE SEMI-PRIVATE ROOM

## 2023-10-18 PROCEDURE — 96365 THER/PROPH/DIAG IV INF INIT: CPT

## 2023-10-18 PROCEDURE — 81001 URINALYSIS AUTO W/SCOPE: CPT | Performed by: PHYSICIAN ASSISTANT

## 2023-10-18 PROCEDURE — 87088 URINE BACTERIA CULTURE: CPT | Performed by: PHYSICIAN ASSISTANT

## 2023-10-18 PROCEDURE — 96375 TX/PRO/DX INJ NEW DRUG ADDON: CPT

## 2023-10-18 PROCEDURE — 63600175 PHARM REV CODE 636 W HCPCS: Performed by: SURGERY

## 2023-10-18 PROCEDURE — 25000003 PHARM REV CODE 250: Performed by: PHYSICIAN ASSISTANT

## 2023-10-18 PROCEDURE — 25500020 PHARM REV CODE 255: Performed by: EMERGENCY MEDICINE

## 2023-10-18 RX ORDER — OXYCODONE HYDROCHLORIDE 5 MG/1
5 TABLET ORAL EVERY 6 HOURS PRN
Status: DISCONTINUED | OUTPATIENT
Start: 2023-10-18 | End: 2023-10-20 | Stop reason: HOSPADM

## 2023-10-18 RX ORDER — THYROID 30 MG/1
30 TABLET ORAL
Status: DISCONTINUED | OUTPATIENT
Start: 2023-10-19 | End: 2023-10-20 | Stop reason: HOSPADM

## 2023-10-18 RX ORDER — NALOXONE HCL 0.4 MG/ML
0.02 VIAL (ML) INJECTION
Status: DISCONTINUED | OUTPATIENT
Start: 2023-10-18 | End: 2023-10-20 | Stop reason: HOSPADM

## 2023-10-18 RX ORDER — ACETAMINOPHEN 325 MG/1
650 TABLET ORAL EVERY 6 HOURS
Status: DISCONTINUED | OUTPATIENT
Start: 2023-10-19 | End: 2023-10-20 | Stop reason: HOSPADM

## 2023-10-18 RX ORDER — THYROID, PORCINE 30 MG/1
30 TABLET ORAL
Status: ON HOLD | COMMUNITY
Start: 2023-10-01 | End: 2023-10-19

## 2023-10-18 RX ORDER — DEXTROSE MONOHYDRATE, SODIUM CHLORIDE, AND POTASSIUM CHLORIDE 50; 1.49; 4.5 G/1000ML; G/1000ML; G/1000ML
INJECTION, SOLUTION INTRAVENOUS CONTINUOUS
Status: DISCONTINUED | OUTPATIENT
Start: 2023-10-18 | End: 2023-10-19

## 2023-10-18 RX ORDER — LEVOTHYROXINE SODIUM 50 UG/1
50 TABLET ORAL
Status: DISCONTINUED | OUTPATIENT
Start: 2023-10-19 | End: 2023-10-20 | Stop reason: HOSPADM

## 2023-10-18 RX ORDER — SODIUM CHLORIDE 0.9 % (FLUSH) 0.9 %
10 SYRINGE (ML) INJECTION
Status: DISCONTINUED | OUTPATIENT
Start: 2023-10-18 | End: 2023-10-20 | Stop reason: HOSPADM

## 2023-10-18 RX ORDER — ONDANSETRON 2 MG/ML
4 INJECTION INTRAMUSCULAR; INTRAVENOUS EVERY 6 HOURS PRN
Status: DISCONTINUED | OUTPATIENT
Start: 2023-10-18 | End: 2023-10-20 | Stop reason: HOSPADM

## 2023-10-18 RX ORDER — IBUPROFEN 600 MG/1
600 TABLET ORAL EVERY 6 HOURS
Status: DISCONTINUED | OUTPATIENT
Start: 2023-10-19 | End: 2023-10-20 | Stop reason: HOSPADM

## 2023-10-18 RX ORDER — METRONIDAZOLE 500 MG/100ML
500 INJECTION, SOLUTION INTRAVENOUS
Status: DISCONTINUED | OUTPATIENT
Start: 2023-10-18 | End: 2023-10-20

## 2023-10-18 RX ORDER — ONDANSETRON 2 MG/ML
4 INJECTION INTRAMUSCULAR; INTRAVENOUS
Status: COMPLETED | OUTPATIENT
Start: 2023-10-18 | End: 2023-10-18

## 2023-10-18 RX ADMIN — IOHEXOL 100 ML: 350 INJECTION, SOLUTION INTRAVENOUS at 08:10

## 2023-10-18 RX ADMIN — ACETAMINOPHEN 650 MG: 325 TABLET ORAL at 11:10

## 2023-10-18 RX ADMIN — METRONIDAZOLE 500 MG: 5 INJECTION, SOLUTION INTRAVENOUS at 11:10

## 2023-10-18 RX ADMIN — IBUPROFEN 600 MG: 600 TABLET ORAL at 11:10

## 2023-10-18 RX ADMIN — CEFTRIAXONE 1 G: 1 INJECTION, POWDER, FOR SOLUTION INTRAMUSCULAR; INTRAVENOUS at 09:10

## 2023-10-18 RX ADMIN — ONDANSETRON 4 MG: 2 INJECTION INTRAMUSCULAR; INTRAVENOUS at 06:10

## 2023-10-18 RX ADMIN — SODIUM CHLORIDE 1000 ML: 9 INJECTION, SOLUTION INTRAVENOUS at 06:10

## 2023-10-18 NOTE — ED PROVIDER NOTES
Encounter Date: 10/18/2023       History     Chief Complaint   Patient presents with    Abdominal Pain     Seen at [facility] ct 2d ago,  chills , states r side of abd is growing, sent home from [facility] with zofran and norco     52-year-old female with history of perforated diverticulitis s/p sigmoid colectomy, colostomy s/p reversal, GERD, thyroid disease, recurrent abdominal wall wound with dehiscence presents for right lower quadrant abdominal pain and distention after being diagnosed with an abdominal wall abscess a few days ago.  She was seen at [facility] for abdominal pain with fatigue, chills and diagnosis abdominal wall abscess apparently, she was supposed to be transferred to this facility but reason this did not happen.  Presently, she states that she feels worse with, chills, nausea, abdominal distention.  She denies vomiting, fever or changes in bowel movements.      Review of patient's allergies indicates:  No Known Allergies  Past Medical History:   Diagnosis Date    Dehisced gastrointestinal anastomosis     Diverticulitis     GERD (gastroesophageal reflux disease)     Thyroid disease     Wound dehiscence      Past Surgical History:   Procedure Laterality Date    COLON SURGERY  01/2022    sigmoid colon removed    COLONOSCOPY  2021    COLONOSCOPY  2022    COLOSTOMY      ROBOTIC CLOSURE, COLOSTOMY N/A 6/14/2022    Procedure: ROBOTIC CLOSURE, COLOSTOMY ;  Surgeon: Adelina Garces MD;  Location: [facility];  Service: Colon and Rectal;  Laterality: N/A;  W/ FLEXIBLE SIGMOIDOSCOPY   LITHOTOMY EEA STAPLER       URETHRA SURGERY       No family history on file.  Social History     Tobacco Use    Smoking status: Former    Smokeless tobacco: Never   Substance Use Topics    Alcohol use: Not Currently     Comment: rare     Review of Systems    Physical Exam     Initial Vitals [10/18/23 1615]   BP Pulse Resp Temp SpO2   (!) 140/81 92 20 99.8 °F (37.7 °C) 96 %      MAP       --         Physical Exam    Nursing note and  vitals reviewed.  Constitutional: She appears well-developed and well-nourished. She is not diaphoretic. She is Obese . No distress.   HENT:   Head: Normocephalic and atraumatic.   Eyes: EOM are normal. Pupils are equal, round, and reactive to light.   Neck:   Normal range of motion.  Cardiovascular:  Normal rate, regular rhythm, normal heart sounds and intact distal pulses.     Exam reveals no gallop and no friction rub.       No murmur heard.  Pulmonary/Chest: Breath sounds normal. No respiratory distress. She has no wheezes. She has no rhonchi. She has no rales. She exhibits no tenderness.   Abdominal: Abdomen is soft. Bowel sounds are normal. She exhibits no distension and no mass. There is abdominal tenderness in the right lower quadrant.   Fullness in the RLQ with tenderness, no visible skin changes    Multiple abdominal scars There is no rebound and no guarding.   Musculoskeletal:         General: Normal range of motion.      Cervical back: Normal range of motion.     Neurological: She is alert and oriented to person, place, and time.   Skin: Skin is warm and dry.   Psychiatric: She has a normal mood and affect.         ED Course   Procedures  Labs Reviewed   COMPREHENSIVE METABOLIC PANEL - Abnormal; Notable for the following components:       Result Value    CO2 22 (*)     All other components within normal limits   URINALYSIS, REFLEX TO URINE CULTURE - Abnormal; Notable for the following components:    Appearance, UA Hazy (*)     Protein, UA Trace (*)     Occult Blood UA 1+ (*)     Nitrite, UA Positive (*)     Leukocytes, UA 3+ (*)     All other components within normal limits    Narrative:     Specimen Source->Urine   URINALYSIS MICROSCOPIC - Abnormal; Notable for the following components:    RBC, UA 10 (*)     WBC, UA >100 (*)     Bacteria Many (*)     Non-Squam Epith 5 (*)     All other components within normal limits    Narrative:     Specimen Source->Urine   C-REACTIVE PROTEIN - Abnormal; Notable for  the following components:    CRP 98.1 (*)     All other components within normal limits    Narrative:     ADD ON CRP PER DR YANELI GOMEZ/ORDER# [id number] @ 22:08   CULTURE, BLOOD   CULTURE, BLOOD   CULTURE, URINE   CULTURE, ANAEROBIC   CULTURE, AEROBIC  (SPECIFY SOURCE)   CBC W/ AUTO DIFFERENTIAL   LACTIC ACID, PLASMA   LIPASE   C-REACTIVE PROTEIN   PROTIME-INR          Imaging Results               CT Abdomen Pelvis With Contrast (Final result)  Result time 10/18/23 21:54:39      Final result by Markel Mortensen MD (10/18/23 21:54:39)                   Impression:      Enlarging right lower quadrant peripherally enhancing fluid collection in the subcutaneous soft tissues with surrounding inflammatory change concerning for abdominal wall abscess formation.    Improved rectal wall thickening since the prior examination.    Hepatomegaly.    Additional findings as described.    This report was flagged in Epic as abnormal.    Electronically signed by resident: Adrianne Dyson  Date:    10/18/2023  Time:    20:35    Electronically signed by: Markel Mortensen MD  Date:    10/18/2023  Time:    21:54               Narrative:    EXAMINATION:  CT ABDOMEN PELVIS WITH CONTRAST    CLINICAL HISTORY:  Abdominal abscess/infection suspected;?  Abdominal wall abscess diagnosed at outside facility;    TECHNIQUE:  Low dose axial images, sagittal and coronal reformations were obtained from the lung bases to the pubic symphysis following the IV administration of 100 mL of Omnipaque 350 .  Oral contrast was not administered.    COMPARISON:  CT abdomen pelvis 06/12/2023, 10/08/2022    FINDINGS:  Heart: Normal in size. No pericardial effusion.    Lung Bases: Dependent atelectasis.  No consolidation or pleural effusion.    Liver: Hepatomegaly measuring 20.4 cm in craniocaudal dimension.  No focal lesions.  Portal veins are patent.    Gallbladder: No calcified stones or surrounding inflammatory change.    Bile Ducts: No evidence of  dilated ducts.    Pancreas: No mass or peripancreatic fat stranding.    Spleen: Normal size and attenuation.    Adrenals: Unremarkable. No focal lesions.    Kidneys/ Ureters: Normal enhancement.  Normal size and location.  No mass or hydronephrosis.  The ureters are unremarkable.    Bladder: Not fully distended.    Reproductive organs: Uterus and adnexa are unremarkable.    GI Tract/Mesentery: Stomach is nondistended which limits its evaluation.  There is no evidence of small-bowel obstruction.  Appendix appears within normal limits.  Postop changes in the sigmoid colon.  Improved rectal wall thickening and surrounding stranding since the prior examination.  No evidence of bowel inflammation.    Peritoneal Space: No ascites. No free air.    Retroperitoneum: No significant adenopathy.    Abdominal wall: Enlarged peripherally enhancing fluid collection involving the soft tissues of the right lower quadrant measuring 8.6 x 5.9 cm, previously 4.8 x 1.9 cm (series 2, image 146).  Increased surrounding inflammatory stranding.  Additional postoperative changes involving the infraumbilical abdominal wall.  Fat and small bowel containing hernia along the inferior aspect of the pannus.  Additional remote operative changes of colostomy reversal    Vasculature: No significant atherosclerosis or aneurysm.    Bones: Minimal aortoiliac calcific atherosclerosis.  No aneurysm.                                       Medications   levothyroxine tablet 50 mcg (has no administration in time range)   thyroid (pork) tablet 30 mg (has no administration in time range)   sodium chloride 0.9% flush 10 mL (has no administration in time range)   naloxone 0.4 mg/mL injection 0.02 mg (has no administration in time range)   dextrose 5 % and 0.45 % NaCl with KCl 20 mEq infusion (has no administration in time range)   ondansetron injection 4 mg (has no administration in time range)   acetaminophen tablet 650 mg (has no administration in time range)    ibuprofen tablet 600 mg (has no administration in time range)   oxyCODONE immediate release tablet 5 mg (has no administration in time range)   cefTRIAXone (ROCEPHIN) 2 g in dextrose 5 % in water (D5W) 100 mL IVPB (MB+) (has no administration in time range)   metronidazole IVPB 500 mg (has no administration in time range)   sodium chloride 0.9% bolus 1,000 mL 1,000 mL (0 mLs Intravenous Stopped 10/18/23 2100)   ondansetron injection 4 mg (4 mg Intravenous Given 10/18/23 1850)   iohexoL (OMNIPAQUE 350) injection 100 mL (100 mLs Intravenous Given 10/18/23 2005)   cefTRIAXone (ROCEPHIN) 1 g in dextrose 5 % in water (D5W) 100 mL IVPB (MB+) (0 g Intravenous Stopped 10/18/23 2139)     Medical Decision Making  52-year-old female presenting for persistent, worsening pain associated with a previously diagnosed.  She is mildly hypertensive 140/81 with otherwise normal vitals.  She appears uncomfortable and upset but nontoxic.    Differential diagnosis:  Intra-abdominal abscess   Abdominal wall abscess   Not overtly septic however given lack of treatment this is a concern   Bacteremia     Repeat CT abdomen pelvis and reassess.    Lab workup is reassuring, CT shows abdominal wall fluid collection.  Case discussed with General surgery and Colorectal surgery.  Patient will be admitted to Colorectal surgery for further management.  Patient comfortable with admission.    Amount and/or Complexity of Data Reviewed  Labs: ordered. Decision-making details documented in ED Course.  Radiology: ordered and independent interpretation performed. Decision-making details documented in ED Course.  Discussion of management or test interpretation with external provider(s): Case discussed with General surgery and Colorectal surgery.  See ED course    Risk  Prescription drug management.  Decision regarding hospitalization.               ED Course as of 10/18/23 2314   Wed Oct 18, 2023   1855 WBC: 10.78 [CC]   1918 Lactate, Javon: 1.1 [CC]   2028  CT Abdomen Pelvis With Contrast  Per my independent interpretation, large abdominal wall abscess.  Will consult General surgery [CC]   2040 Case discussed with General surgery who will evaluate the patient [CC]   2107 Discussed with General surgery who discussed this patient with Radiology.  Abscess does not communicate with the bowel.  Given that this patient has had prior care with Colorectal surgery as she has been followed by them previously, will consult colorectal surgery [CC]   2111 Case discussed with Colorectal surgery who will evaluate the patient [CC]      ED Course User Index  [CC] Inez Finney PA-C                    Clinical Impression:   Final diagnoses:  [R18.8] Abdominal wall fluid collections        ED Disposition Condition    Admit Stable                Inez Finney PA-C  10/18/23 1620

## 2023-10-18 NOTE — ED TRIAGE NOTES
The patient was seen at [facility] two days ago. The patient was experiencing chills. The patient states r side of abd is growing due to an abbesses, sent home from [facility] with zofran and norco.

## 2023-10-19 ENCOUNTER — TELEPHONE (OUTPATIENT)
Dept: SURGERY | Facility: CLINIC | Age: 53
End: 2023-10-19
Payer: MEDICAID

## 2023-10-19 PROBLEM — N30.90 CYSTITIS: Status: ACTIVE | Noted: 2023-10-19

## 2023-10-19 PROBLEM — Z87.19 HX OF DIVERTICULITIS OF COLON: Status: ACTIVE | Noted: 2023-10-19

## 2023-10-19 LAB
ANION GAP SERPL CALC-SCNC: 11 MMOL/L (ref 8–16)
BASOPHILS # BLD AUTO: 0.04 K/UL (ref 0–0.2)
BASOPHILS NFR BLD: 0.4 % (ref 0–1.9)
BUN SERPL-MCNC: 13 MG/DL (ref 6–20)
CALCIUM SERPL-MCNC: 9.2 MG/DL (ref 8.7–10.5)
CHLORIDE SERPL-SCNC: 104 MMOL/L (ref 95–110)
CO2 SERPL-SCNC: 21 MMOL/L (ref 23–29)
CREAT SERPL-MCNC: 0.8 MG/DL (ref 0.5–1.4)
CRP SERPL-MCNC: 101.5 MG/L (ref 0–8.2)
DIFFERENTIAL METHOD: ABNORMAL
EOSINOPHIL # BLD AUTO: 0.2 K/UL (ref 0–0.5)
EOSINOPHIL NFR BLD: 1.7 % (ref 0–8)
ERYTHROCYTE [DISTWIDTH] IN BLOOD BY AUTOMATED COUNT: 12.8 % (ref 11.5–14.5)
EST. GFR  (NO RACE VARIABLE): >60 ML/MIN/1.73 M^2
GLUCOSE SERPL-MCNC: 90 MG/DL (ref 70–110)
HCT VFR BLD AUTO: 37.2 % (ref 37–48.5)
HGB BLD-MCNC: 12.2 G/DL (ref 12–16)
IMM GRANULOCYTES # BLD AUTO: 0.02 K/UL (ref 0–0.04)
IMM GRANULOCYTES NFR BLD AUTO: 0.2 % (ref 0–0.5)
LYMPHOCYTES # BLD AUTO: 2.7 K/UL (ref 1–4.8)
LYMPHOCYTES NFR BLD: 26.5 % (ref 18–48)
MAGNESIUM SERPL-MCNC: 1.8 MG/DL (ref 1.6–2.6)
MCH RBC QN AUTO: 29.7 PG (ref 27–31)
MCHC RBC AUTO-ENTMCNC: 32.8 G/DL (ref 32–36)
MCV RBC AUTO: 91 FL (ref 82–98)
MONOCYTES # BLD AUTO: 1.1 K/UL (ref 0.3–1)
MONOCYTES NFR BLD: 10.7 % (ref 4–15)
NEUTROPHILS # BLD AUTO: 6.1 K/UL (ref 1.8–7.7)
NEUTROPHILS NFR BLD: 60.5 % (ref 38–73)
NRBC BLD-RTO: 0 /100 WBC
PHOSPHATE SERPL-MCNC: 3.8 MG/DL (ref 2.7–4.5)
PLATELET # BLD AUTO: 254 K/UL (ref 150–450)
PMV BLD AUTO: 10.9 FL (ref 9.2–12.9)
POTASSIUM SERPL-SCNC: 3.9 MMOL/L (ref 3.5–5.1)
RBC # BLD AUTO: 4.11 M/UL (ref 4–5.4)
SODIUM SERPL-SCNC: 136 MMOL/L (ref 136–145)
WBC # BLD AUTO: 10.13 K/UL (ref 3.9–12.7)

## 2023-10-19 PROCEDURE — 63600175 PHARM REV CODE 636 W HCPCS: Performed by: SURGERY

## 2023-10-19 PROCEDURE — 80048 BASIC METABOLIC PNL TOTAL CA: CPT | Performed by: SURGERY

## 2023-10-19 PROCEDURE — 99232 SBSQ HOSP IP/OBS MODERATE 35: CPT | Mod: ,,, | Performed by: NURSE PRACTITIONER

## 2023-10-19 PROCEDURE — 20600001 HC STEP DOWN PRIVATE ROOM

## 2023-10-19 PROCEDURE — 25000003 PHARM REV CODE 250

## 2023-10-19 PROCEDURE — 25000003 PHARM REV CODE 250: Performed by: SURGERY

## 2023-10-19 PROCEDURE — 85025 COMPLETE CBC W/AUTO DIFF WBC: CPT | Performed by: SURGERY

## 2023-10-19 PROCEDURE — 87077 CULTURE AEROBIC IDENTIFY: CPT | Performed by: SURGERY

## 2023-10-19 PROCEDURE — 99223 1ST HOSP IP/OBS HIGH 75: CPT | Mod: ,,,

## 2023-10-19 PROCEDURE — 99232 PR SUBSEQUENT HOSPITAL CARE,LEVL II: ICD-10-PCS | Mod: ,,, | Performed by: NURSE PRACTITIONER

## 2023-10-19 PROCEDURE — 84100 ASSAY OF PHOSPHORUS: CPT | Performed by: SURGERY

## 2023-10-19 PROCEDURE — 83735 ASSAY OF MAGNESIUM: CPT | Performed by: SURGERY

## 2023-10-19 PROCEDURE — 99223 PR INITIAL HOSPITAL CARE,LEVL III: ICD-10-PCS | Mod: ,,,

## 2023-10-19 PROCEDURE — 86140 C-REACTIVE PROTEIN: CPT | Performed by: SURGERY

## 2023-10-19 PROCEDURE — 87070 CULTURE OTHR SPECIMN AEROBIC: CPT | Performed by: SURGERY

## 2023-10-19 PROCEDURE — 87186 SC STD MICRODIL/AGAR DIL: CPT | Performed by: SURGERY

## 2023-10-19 PROCEDURE — 63600175 PHARM REV CODE 636 W HCPCS: Performed by: STUDENT IN AN ORGANIZED HEALTH CARE EDUCATION/TRAINING PROGRAM

## 2023-10-19 PROCEDURE — 87075 CULTR BACTERIA EXCEPT BLOOD: CPT | Performed by: SURGERY

## 2023-10-19 PROCEDURE — 36415 COLL VENOUS BLD VENIPUNCTURE: CPT | Performed by: SURGERY

## 2023-10-19 RX ORDER — MIDAZOLAM HYDROCHLORIDE 1 MG/ML
INJECTION INTRAMUSCULAR; INTRAVENOUS
Status: COMPLETED | OUTPATIENT
Start: 2023-10-19 | End: 2023-10-19

## 2023-10-19 RX ORDER — FENTANYL CITRATE 50 UG/ML
INJECTION, SOLUTION INTRAMUSCULAR; INTRAVENOUS
Status: COMPLETED | OUTPATIENT
Start: 2023-10-19 | End: 2023-10-19

## 2023-10-19 RX ORDER — TALC
9 POWDER (GRAM) TOPICAL NIGHTLY PRN
Status: DISCONTINUED | OUTPATIENT
Start: 2023-10-19 | End: 2023-10-20 | Stop reason: HOSPADM

## 2023-10-19 RX ADMIN — FENTANYL CITRATE 50 MCG: 50 INJECTION, SOLUTION INTRAMUSCULAR; INTRAVENOUS at 09:10

## 2023-10-19 RX ADMIN — FENTANYL CITRATE 50 MCG: 50 INJECTION, SOLUTION INTRAMUSCULAR; INTRAVENOUS at 10:10

## 2023-10-19 RX ADMIN — IBUPROFEN 600 MG: 600 TABLET ORAL at 06:10

## 2023-10-19 RX ADMIN — MIDAZOLAM HYDROCHLORIDE 1 MG: 2 INJECTION, SOLUTION INTRAMUSCULAR; INTRAVENOUS at 09:10

## 2023-10-19 RX ADMIN — ACETAMINOPHEN 650 MG: 325 TABLET ORAL at 06:10

## 2023-10-19 RX ADMIN — ACETAMINOPHEN 650 MG: 325 TABLET ORAL at 12:10

## 2023-10-19 RX ADMIN — METRONIDAZOLE 500 MG: 5 INJECTION, SOLUTION INTRAVENOUS at 04:10

## 2023-10-19 RX ADMIN — Medication 9 MG: at 09:10

## 2023-10-19 RX ADMIN — CEFTRIAXONE SODIUM 2 G: 2 INJECTION, POWDER, FOR SOLUTION INTRAMUSCULAR; INTRAVENOUS at 09:10

## 2023-10-19 RX ADMIN — IBUPROFEN 600 MG: 600 TABLET ORAL at 12:10

## 2023-10-19 RX ADMIN — METRONIDAZOLE 500 MG: 5 INJECTION, SOLUTION INTRAVENOUS at 08:10

## 2023-10-19 RX ADMIN — DEXTROSE, SODIUM CHLORIDE, AND POTASSIUM CHLORIDE: 5; .45; .15 INJECTION INTRAVENOUS at 04:10

## 2023-10-19 RX ADMIN — MIDAZOLAM HYDROCHLORIDE 1 MG: 2 INJECTION, SOLUTION INTRAMUSCULAR; INTRAVENOUS at 10:10

## 2023-10-19 RX ADMIN — METRONIDAZOLE 500 MG: 5 INJECTION, SOLUTION INTRAVENOUS at 11:10

## 2023-10-19 RX ADMIN — LEVOTHYROXINE SODIUM 50 MCG: 50 TABLET ORAL at 06:10

## 2023-10-19 NOTE — CONSULTS
Consult Note  Interventional Radiology      Date: 10/19/2023   Primary team: Networked reference to record [facility]EREC[facility] , Adelina Garces MD   Room/bed: 1061/1061 A    Inpatient consult to Interventional Radiology  Consult performed by: Jessica Freeman PA-C  Consult ordered by: Joselito Siddiqi MD           Reason for Consult:   abdominal wall fluid collection; please sample and place drain     SUBJECTIVE:     Chief Complaint:  RLQ abdominal wall collection    History of Present Illness:  Marika Lindsey is a 52 y.o. female with a past medical history of diverticulitis s/p Liz's at OSH 01/2022, robotic Liz's takedown 6/14/22 at [facility] with delayed wound healing who was admitted on 10/18/23 for RLQ abdominal pain.Pt has had recent imaging including a CT which revealed an enlarging right lower quadrant peripherally enhancing fluid collection in the subcutaneous soft tissues with surrounding inflammatory change concerning for abdominal wall abscess formation. The pt's WBC is 10, currently afebrile, and is hemodynamically stable. Interventional Radiology has been consulted for image guided percutaneous aspiration/drain placement for a RLQ abdominal wall collection.       Review of Systems   Constitutional: Negative.    HENT: Negative.     Respiratory: Negative.     Cardiovascular: Negative.    Gastrointestinal:  Positive for abdominal pain.   Musculoskeletal: Negative.    Skin: Negative.    Neurological: Negative.    Psychiatric/Behavioral: Negative.          Scheduled Meds:   acetaminophen  650 mg Oral Q6H    cefTRIAXone (ROCEPHIN) IVPB  2 g Intravenous Q24H    ibuprofen  600 mg Oral Q6H    levothyroxine  50 mcg Oral Before breakfast    metronidazole  500 mg Intravenous Q8H    thyroid (pork)  30 mg Oral Before breakfast     Continuous Infusions:   dextrose 5 % and 0.45 % NaCl with KCl 20 mEq 40 mL/hr at 10/19/23 0645     PRN Meds:naloxone, ondansetron, oxyCODONE, sodium chloride 0.9%    Review of patient's allergies  indicates:   Allergen Reactions    Adhesive Rash       Past Medical History:   Diagnosis Date    Dehisced gastrointestinal anastomosis     Diverticulitis     GERD (gastroesophageal reflux disease)     Thyroid disease     Wound dehiscence      Past Surgical History:   Procedure Laterality Date    COLON SURGERY  2022    sigmoid colon removed    COLONOSCOPY      COLONOSCOPY      COLOSTOMY      ROBOTIC CLOSURE, COLOSTOMY N/A 2022    Procedure: ROBOTIC CLOSURE, COLOSTOMY ;  Surgeon: Adelina Garces MD;  Location: [facility];  Service: Colon and Rectal;  Laterality: N/A;  W/ FLEXIBLE SIGMOIDOSCOPY   LITHOTOMY EEA STAPLER       URETHRA SURGERY       No family history on file.  Social History     Tobacco Use    Smoking status: Former     Current packs/day: 0.00     Types: Cigarettes     Quit date: 10/19/2006     Years since quittin.0    Smokeless tobacco: Never   Substance Use Topics    Alcohol use: Not Currently     Comment: rare    Drug use: Not Currently       OBJECTIVE:     Vital Signs (Most Recent)  Temp: 98.5 °F (36.9 °C) (10/19/23 0759)  Pulse: 63 (10/19/23 0759)  Resp: 12 (10/19/23 0759)  BP: (!) 114/57 (10/19/23 0759)  SpO2: (!) 94 % (10/19/23 0759)    Physical Exam:   Physical Exam  Constitutional:       Appearance: She is obese.      Comments: Pleasant    HENT:      Head: Normocephalic.   Cardiovascular:      Rate and Rhythm: Normal rate.   Pulmonary:      Effort: Pulmonary effort is normal.   Abdominal:      General: Abdomen is flat.   Neurological:      Mental Status: She is alert and oriented to person, place, and time. Mental status is at baseline.   Psychiatric:         Mood and Affect: Mood normal.         Laboratory  I have reviewed all pertinent lab results within the past 24 hours.  CBC:   Recent Labs   Lab 10/19/23  0235   WBC 10.13   RBC 4.11   HGB 12.2   HCT 37.2      MCV 91   MCH 29.7   MCHC 32.8     CMP:   Recent Labs   Lab 10/18/23  1840 10/19/23  0235    90   CALCIUM  10.1 9.2   ALBUMIN 4.0  --    PROT 8.0  --     136   K 4.4 3.9   CO2 22* 21*    104   BUN 15 13   CREATININE 0.9 0.8   ALKPHOS 77  --    ALT 15  --    AST 18  --    BILITOT 0.6  --      Coagulation:   Recent Labs   Lab 10/18/23  2327   LABPROT 10.8   INR 1.0         Anticoagulants/Antiplatelets:   No anticoagulation    ASA/Mallampati  ASA: 2  Mallampati: 2    Imaging:  Reviewed by Lio Arellano MD.     ASSESSMENT/PLAN:     Assessment:  52 y.o. female with a past medical history of diverticulitis s/p Liz's at OSH 01/2022, robotic Liz's [place] [facility] [place] who was admitted on 10/18/23 for RLQ abdominal pain. CT revealed an enlarging right lower quadrant peripherally enhancing fluid collection in the subcutaneous soft tissues with surrounding inflammatory change concerning for abdominal wall abscess formation.Interventional Radiology has been consulted for image guided percutaneous aspiration/drain placement for a RLQ abdominal wall collection.     Plan:  Will proceed with ultrasound guided percutaneous aspiration/drain placement for mgmt of a RLQ fluid collection on 10/19/23.   Sedation plan: up to moderate  Anticoagulation history reviewed.Coagulation labs reviewed.  Thank you for the consult. Please contact with questions via eSnips secure chat.    Jessica Freeman PA-C  Interventional Radiology  10/19/2023

## 2023-10-19 NOTE — PROGRESS NOTES
[facility]  Colorectal Surgery  Progress Note    Patient Name: Marika Lindsey  MRN: [id number]  Admission Date: 10/18/2023  Hospital Length of Stay: 1 days  Attending Physician: Adelina Garces MD    Subjective:     Interval History: no acute events overnite, feeling better, for IR today    Post-Op Info:  * No surgery found *          Medications:  Continuous Infusions:   dextrose 5 % and 0.45 % NaCl with KCl 20 mEq 40 mL/hr at 10/19/23 0655     Scheduled Meds:   acetaminophen  650 mg Oral Q6H    cefTRIAXone (ROCEPHIN) IVPB  2 g Intravenous Q24H    ibuprofen  600 mg Oral Q6H    levothyroxine  50 mcg Oral Before breakfast    metronidazole  500 mg Intravenous Q8H    thyroid (pork)  30 mg Oral Before breakfast     PRN Meds:   naloxone    ondansetron    oxyCODONE    sodium chloride 0.9%        Objective:     Vital Signs (Most Recent):  Temp: 97.5 °F (36.4 °C) (10/19/23 1018)  Pulse: (!) 58 (10/19/23 1115)  Resp: 18 (10/19/23 1115)  BP: (!) 110/58 (10/19/23 1115)  SpO2: 99 % (10/19/23 1143) Vital Signs (24h Range):  Temp:  [97.5 °F (36.4 °C)-99.8 °F (37.7 °C)] 97.5 °F (36.4 °C)  Pulse:  [57-92] 58  Resp:  [12-20] 18  SpO2:  [87 %-100 %] 99 %  BP: ()/(50-81) 110/58     Intake/Output - Last 3 Shifts         10/17 0700  10/18 0659 10/18 0700  10/19 0659 10/19 0700  10/20 0659    I.V. (mL/kg)  114.7 (1)     IV Piggyback  100     Total Intake(mL/kg)  214.7 (1.9)     Urine (mL/kg/hr)  500     Stool  0     Total Output  500     Net  -285.3            Urine Occurrence  1 x     Stool Occurrence  0 x              Physical Exam  Vitals and nursing note reviewed.   Constitutional:       Appearance: She is well-developed.   HENT:      Head: Normocephalic.   Eyes:      Pupils: Pupils are equal, round, and reactive to light.   Cardiovascular:      Rate and Rhythm: Normal rate and regular rhythm.      Heart sounds: Normal heart sounds.   Pulmonary:      Effort: Pulmonary effort is normal. No respiratory  distress.      Breath sounds: Normal breath sounds. No wheezing or rales.   Abdominal:      Palpations: Abdomen is soft. There is no mass.      Tenderness: There is no guarding or rebound.      Comments: Abd inc line healed   Skin:     General: Skin is warm and dry.   Neurological:      Mental Status: She is alert and oriented to person, place, and time.   Psychiatric:      Comments: Hx of short term memory loss, in nursing home  Mentality at baseline                Significant Labs:  BMP (Last 3 Results):   Recent Labs   Lab 10/18/23  1840 10/19/23  0235    90    136   K 4.4 3.9    104   CO2 22* 21*   BUN 15 13   CREATININE 0.9 0.8   CALCIUM 10.1 9.2   MG  --  1.8     CBC (Last 3 Results):   Recent Labs   Lab 10/18/23  1840 10/19/23  0235   WBC 10.78 10.13   RBC 4.76 4.11   HGB 14.0 12.2   HCT 42.3 37.2    254   MCV 89 91   MCH 29.4 29.7   MCHC 33.1 32.8       Significant Diagnostics:  CORI wilkes    Assessment/Plan:     * Abdominal wall fluid collections  Ms. Lindsey is a 52 year old woman with history of Liz's for diverticulitis followed by robotic stoma closure 6/14/22 by Dr. Garces. She now presents with symptomatic RLQ abdominal wall fluid collection, seroma vs abscess. Unclear from her scan the etiology. She has had fluid there on many previous scans, however today it is much larger and now symptomatic. She may have an underlying stitch abscess. No history of mesh implantation. Otherwise, she is stable with systemic signs of sepsis. She does have mild dysuria and an abnormal UA, however it appears unlikely that cystitis is driving most of her symptoms.     For IR today  Continue CAROLYN    Cystitis  Monitor vs  Monitor labs  Cont CAROLYN for now          Candace Melendez NP  Colorectal Surgery  [facility]

## 2023-10-19 NOTE — SEDATION DOCUMENTATION
Abscess drain placement procedure complete. Pt tolerated well with no signs of distress noted. Dressing to site is clean, dry, and intact. Pt will be transported to [facility] for 1 hour recovery.

## 2023-10-19 NOTE — TELEPHONE ENCOUNTER
Spoke with pt regarding 2-3 wk f/u for drain management. Offered pt to be seen at 2 PM on 11/6 at the [facility]. Pt verbally confirmed time, date, and location. Denies further questions at this time.       ----- Message from Adelina Garces MD sent at 10/19/2023  2:58 PM CDT -----  Ms. Lindsey needs follow up for drain management in 2-3 weeks.

## 2023-10-19 NOTE — CONSULTS
Subjective:       Patient ID: Marika Lindsey is a 52 y.o. female.    Chief Complaint: Abdominal Pain (Seen at [facility] ct 2d ago,  chills , states r side of abd is growing, sent home from [facility] with zofran and norco)    HPI    Ms. Lindsey is a 52 year old woman with history of diverticulitis s/p Liz's at OSH 01/2022 then robotic Liz's takedown 6/14/22 by Dr. Garces. Postoperative course complicated by delayed wound healing of midline and ostomy site wounds. Ultimately both of her wounds have healed and have been stable for several months. At last visit 06/2023, noted to have completely healed abdominal wall. CT demonstrated proctitis, resolved left abdominal wall collection, but persistent small right abdominal wall collection.    She presents with 5 days of fatigue, decreased energy, subjective fevers and chills, as well as RLQ abdominal wall bulge and pain. She reports the fatigue started first and three days ago she noted the abdominal wall bulge with pain. Having bowel function, occasional nausea, no emesis or bloating. No bleeding. Mild dysuria, no hematuria or retention. She presented to [facility] and was transferred to [facility]. She states they were going to plan for IR drain as an outpatient but they never called her. Due to dissatisfaction with her care she presents to our ER today for evaluation. Our team consulted.     Last colonoscopy - 2022  No family hx of CRC or IBD.      Review of patient's allergies indicates:  No Known Allergies    Past Medical History:   Diagnosis Date    Dehisced gastrointestinal anastomosis     Diverticulitis     GERD (gastroesophageal reflux disease)     Thyroid disease     Wound dehiscence        Past Surgical History:   Procedure Laterality Date    COLON SURGERY  01/2022    sigmoid colon removed    COLONOSCOPY  2021    COLONOSCOPY  2022    COLOSTOMY      ROBOTIC CLOSURE, COLOSTOMY N/A 6/14/2022    Procedure: ROBOTIC CLOSURE, COLOSTOMY ;  Surgeon: Adelina Garces MD;   Location: [facility] OR;  Service: Colon and Rectal;  Laterality: N/A;  W/ FLEXIBLE SIGMOIDOSCOPY   LITHOTOMY EEA STAPLER       URETHRA SURGERY         Current Facility-Administered Medications   Medication Dose Route Frequency Provider Last Rate Last Admin    [START ON 10/19/2023] acetaminophen tablet 650 mg  650 mg Oral Q6H Joselito Siddiqi MD        cefTRIAXone (ROCEPHIN) 2 g in dextrose 5 % in water (D5W) 100 mL IVPB (MB+)  2 g Intravenous Q24H Joselito Siddiqi MD        dextrose 5 % and 0.45 % NaCl with KCl 20 mEq infusion   Intravenous Continuous Joselito Siddiqi MD        [START ON 10/19/2023] ibuprofen tablet 600 mg  600 mg Oral Q6H Joselito Siddiqi MD        [START ON 10/19/2023] levothyroxine tablet 50 mcg  50 mcg Oral Before breakfast Joselito Siddiqi MD        metronidazole IVPB 500 mg  500 mg Intravenous Q8H Joselito Siddiqi MD        naloxone 0.4 mg/mL injection 0.02 mg  0.02 mg Intravenous PRN Joselito Siddiqi MD        ondansetron injection 4 mg  4 mg Intravenous Q6H PRN Joselito Siddiqi MD        oxyCODONE immediate release tablet 5 mg  5 mg Oral Q6H PRN Joselito Siddiqi MD        sodium chloride 0.9% flush 10 mL  10 mL Intravenous PRN Joselito Siddiqi MD        [START ON 10/19/2023] thyroid (pork) tablet 30 mg  30 mg Oral Before breakfast Joselito Siddiqi MD         Current Outpatient Medications   Medication Sig Dispense Refill    ARMOUR THYROID 30 mg Tab Take 30 mg by mouth.      ciprofloxacin HCl (CIPRO) 500 MG tablet Take 1 tablet (500 mg total) by mouth every 12 (twelve) hours. 20 tablet 0    hydrocortisone (ANUSOL-HC) 2.5 % rectal cream Place rectally 2 (two) times daily. 28 g 3    levothyroxine (SYNTHROID) 50 MCG tablet Take 1 tablet (50 mcg total) by mouth before breakfast. 90 tablet 3    metroNIDAZOLE (FLAGYL) 500 MG tablet Take 1 tablet (500 mg total) by mouth every 8 (eight) hours. 30 tablet 0    multivitamin capsule Take 1 capsule by mouth once daily.       Facility-Administered Medications  "Ordered in Other Encounters   Medication Dose Route Frequency Provider Last Rate Last Admin    LIDOcaine (PF) 10 mg/ml (1%) injection 10 mg  1 mL Intradermal Once Candace Melendez NP        mupirocin 2 % ointment   Nasal On Call Procedure Candace Melendez NP   Given at 06/14/22 0554    scopolamine 1.3-1.5 mg (1 mg over 3 days) 1 patch  1 patch Transdermal Q3 Days Candace Melendez NP   1 patch at 06/14/22 0548       No family history on file.    Social History     Socioeconomic History    Marital status: Single   Tobacco Use    Smoking status: Former    Smokeless tobacco: Never   Substance and Sexual Activity    Alcohol use: Not Currently     Comment: rare       Review of Systems    Objective:      Physical Exam      Physical Exam:  BP (!) 119/56 (Patient Position: Sitting)   Pulse 78   Temp 98.6 °F (37 °C) (Oral)   Resp 20   Ht 5' 4" (1.626 m)   Wt 117.9 kg (260 lb)   SpO2 96%   BMI 44.63 kg/m²     General: Alert and oriented x 3. No acute distress. Well-nourished.  HEENT: EOMI. Sclera anicteric. Moist mucous membranes. No scleral icterus. No cervical lymphadenopathy.  Lungs: Clear to auscultation bilaterally. No accessory muscle use.  Cardiac: Regular rate and rhythm. No murmur. No JVD.  Abdomen: soft, non distended. RLQ TTP, hard to characterize bulge in the abdominal wall, no hernia in this area, no open wounds  Extremities: No edema. Non-tender.?  Skin: No rashes or lesions. Warm.  Neuro: No focal neurological deficits. CN II-XII grossly intact, but not individually tested.  Psych: Cooperative. Appropriate mood and affect.    Laboratory Studies:  Complete Blood Count:  Lab Results   Component Value Date/Time    WBC 10.78 10/18/2023 06:40 PM    HGB 14.0 10/18/2023 06:40 PM    HCT 42.3 10/18/2023 06:40 PM    HCT 42 10/08/2022 09:30 AM    RBC 4.76 10/18/2023 06:40 PM     10/18/2023 06:40 PM       Basic Chemistry Panel:  Lab Results   Component Value Date/Time     10/18/2023 06:40 PM "    K 4.4 10/18/2023 06:40 PM     10/18/2023 06:40 PM    CO2 22 (L) 10/18/2023 06:40 PM    BUN 15 10/18/2023 06:40 PM    CREATININE 0.9 10/18/2023 06:40 PM    CALCIUM 10.1 10/18/2023 06:40 PM       Lab Results   Component Value Date/Time    CRP 10.7 (H) 10/08/2022 09:28 AM     CT reviewed. Right lower quadrant abdominal wall fluid collection. Previously present on old scans, however larger today. Now 8cm x 5cm. No other significant findings. No nearby bowel.     Assessment:       1. Abdominal wall fluid collections          Ms. Lindsey is a 52 year old woman with history of Liz's for diverticulitis followed by robotic stoma closure 6/14/22 by Dr. Garces. She now presents with symptomatic RLQ abdominal wall fluid collection, seroma vs abscess. Unclear from her scan the etiology. She has had fluid there on many previous scans, however today it is much larger and now symptomatic. She may have an underlying stitch abscess. No history of mesh implantation. Otherwise, she is stable with systemic signs of sepsis. She does have mild dysuria and an abnormal UA, however it appears unlikely that cystitis is driving most of her symptoms.     Plan:       Admit to CRS  Ceftriaxone/flagyl --> covers abdominal wall abscess and UTI  IR consult for drain  Fluid cultures    Joselito Siddiqi MD  Department of Colon & Rectal Surgery

## 2023-10-19 NOTE — CARE UPDATE
Pt fully recovered from procedure and report called to FABIAN Constantino. Pt to transport back to room shortly.

## 2023-10-19 NOTE — SUBJECTIVE & OBJECTIVE
Subjective:     Interval History: no acute events overnite, feeling better, for IR today    Post-Op Info:  * No surgery found *          Medications:  Continuous Infusions:   dextrose 5 % and 0.45 % NaCl with KCl 20 mEq 40 mL/hr at 10/19/23 0655     Scheduled Meds:   acetaminophen  650 mg Oral Q6H    cefTRIAXone (ROCEPHIN) IVPB  2 g Intravenous Q24H    ibuprofen  600 mg Oral Q6H    levothyroxine  50 mcg Oral Before breakfast    metronidazole  500 mg Intravenous Q8H    thyroid (pork)  30 mg Oral Before breakfast     PRN Meds:   naloxone    ondansetron    oxyCODONE    sodium chloride 0.9%        Objective:     Vital Signs (Most Recent):  Temp: 97.5 °F (36.4 °C) (10/19/23 1018)  Pulse: (!) 58 (10/19/23 1115)  Resp: 18 (10/19/23 1115)  BP: (!) 110/58 (10/19/23 1115)  SpO2: 99 % (10/19/23 1143) Vital Signs (24h Range):  Temp:  [97.5 °F (36.4 °C)-99.8 °F (37.7 °C)] 97.5 °F (36.4 °C)  Pulse:  [57-92] 58  Resp:  [12-20] 18  SpO2:  [87 %-100 %] 99 %  BP: ()/(50-81) 110/58     Intake/Output - Last 3 Shifts         10/17 0700  10/18 0659 10/18 0700  10/19 0659 10/19 0700  10/20 0659    I.V. (mL/kg)  114.7 (1)     IV Piggyback  100     Total Intake(mL/kg)  214.7 (1.9)     Urine (mL/kg/hr)  500     Stool  0     Total Output  500     Net  -285.3            Urine Occurrence  1 x     Stool Occurrence  0 x              Physical Exam  Vitals and nursing note reviewed.   Constitutional:       Appearance: She is well-developed.   HENT:      Head: Normocephalic.   Eyes:      Pupils: Pupils are equal, round, and reactive to light.   Cardiovascular:      Rate and Rhythm: Normal rate and regular rhythm.      Heart sounds: Normal heart sounds.   Pulmonary:      Effort: Pulmonary effort is normal. No respiratory distress.      Breath sounds: Normal breath sounds. No wheezing or rales.   Abdominal:      Palpations: Abdomen is soft. There is no mass.      Tenderness: There is no guarding or rebound.      Comments: Abd inc line healed    Skin:     General: Skin is warm and dry.   Neurological:      Mental Status: She is alert and oriented to person, place, and time.   Psychiatric:      Comments: Hx of short term memory loss, in nursing home  Mentality at baseline                Significant Labs:  BMP (Last 3 Results):   Recent Labs   Lab 10/18/23  1840 10/19/23  0235    90    136   K 4.4 3.9    104   CO2 22* 21*   BUN 15 13   CREATININE 0.9 0.8   CALCIUM 10.1 9.2   MG  --  1.8     CBC (Last 3 Results):   Recent Labs   Lab 10/18/23  1840 10/19/23  0235   WBC 10.78 10.13   RBC 4.76 4.11   HGB 14.0 12.2   HCT 42.3 37.2    254   MCV 89 91   MCH 29.4 29.7   MCHC 33.1 32.8       Significant Diagnostics:  CORI wilkes

## 2023-10-19 NOTE — NURSING
[facility] Plan of Care Note  [facility] Plan of Care Note  Dx Abdominal wall fluid collections     Shift Events Pt arrived to unit from ED@ 0030 in stable condition.  Pt alert and oriented x 4; in no cardiac or respiratory distress.  Pt walked to bathroom voided x 1.  NPO.No significant events.     Goals of Care: safety; manage pain     Neuro: A&O x 4; intact     Vital Signs: see flowsheet     Respiratory: RA     Diet:NPO   Is patient tolerating current diet? yes     GTTS: N/A     Urine Output/Bowel Movement: LBM 10/18; adequate urine output     Drains/Tubes/Tube Feeds (include total output/shift): none     Lines: L AC 20ga        Accuchecks:none     Skin: rintact, healed abdominal wounds     Fall Risk Score: 0     Activity level? ind     Any scheduled procedures? none     Any safety concerns? Fall risk     Other: none

## 2023-10-19 NOTE — NURSING
Patient received s/p abdominal abscess drain placement in MPU bay 4. Procedure site dressing to RLQ is clean and dry, no evidence of bleeding or hematoma formation. Patient to recover for 1 hour and return to inpatient room. Fall precautions reviewed. Bed in lowest, locked position. Call light within reach.

## 2023-10-19 NOTE — NURSING
Pt arrived to floor via wheelchair. No telemetry. Left AC IV  noted, no fluids going, Iv site CDI, flushed and capped.  Pt Ambulated to restroom. Skin assessment completed with charge nurse. Skin intact, Right side abdominal abscess, healed surgery scars noted in mid and lower abdominal area noted. Medication reviewed.  All questions asked and answered. Bed in lowest position and call light in reach.

## 2023-10-19 NOTE — PLAN OF CARE
Problem: Adult Inpatient Plan of Care  Goal: Plan of Care Review  Outcome: Ongoing, Progressing  Goal: Patient-Specific Goal (Individualized)  Outcome: Ongoing, Progressing  Goal: Absence of Hospital-Acquired Illness or Injury  Outcome: Ongoing, Progressing  Goal: Optimal Comfort and Wellbeing  Outcome: Ongoing, Progressing  Goal: Readiness for Transition of Care  Outcome: Ongoing, Progressing     Problem: Bariatric Environmental Safety  Goal: Safety Maintained with Care  Outcome: Ongoing, Progressing     Problem: Adjustment to Illness (Sepsis/Septic Shock)  Goal: Optimal Coping  Outcome: Ongoing, Progressing     Problem: Bleeding (Sepsis/Septic Shock)  Goal: Absence of Bleeding  Outcome: Ongoing, Progressing     Problem: Glycemic Control Impaired (Sepsis/Septic Shock)  Goal: Blood Glucose Level Within Desired Range  Outcome: Ongoing, Progressing     Problem: Infection Progression (Sepsis/Septic Shock)  Goal: Absence of Infection Signs and Symptoms  Outcome: Ongoing, Progressing     Problem: Nutrition Impaired (Sepsis/Septic Shock)  Goal: Optimal Nutrition Intake  Outcome: Ongoing, Progressing

## 2023-10-19 NOTE — PROCEDURES
IR Post-Procedure Note      Pre Op Diagnosis:  RLQ abdominal wall fluid collection    Post Op Diagnosis:  same    Procedure:  Image Guided Drain Placement    Procedure performed by:  Tabatha Felipe MD  /  Anthony Dang MD    Written Informed Consent Obtained: Yes    Specimen Removed:  Yes; aspirate from fluid collection    Estimated Blood Loss: minimal    Findings:    US was used for localization of abnormal fluid collection.     Successful placement of 10 Jamaican all-purpose drainage catheter in the fluid collection.  Approximately  105 mL of purulent fluid was removed. A specimen was sent to the lab for further analysis and culture.    The patient tolerated procedure well and there were no complications. Please see procedure report under Imaging for further details.    Plan:    Connect tube to suction bulb.    Flush drain with 10 cc normal saline daily.     Consider drain removal once drain output < 10 cc daily for 2 consecutive days with clinical improvement and imaging evidence of fluid collection resolution.       Tabatha Felipe MD MSCR  PGY-5 Radiology Resident

## 2023-10-19 NOTE — ASSESSMENT & PLAN NOTE
Ms. Lindsey is a 52 year old woman with history of Liz's for diverticulitis followed by robotic stoma closure 6/14/22 by Dr. Garces. She now presents with symptomatic RLQ abdominal wall fluid collection, seroma vs abscess. Unclear from her scan the etiology. She has had fluid there on many previous scans, however today it is much larger and now symptomatic. She may have an underlying stitch abscess. No history of mesh implantation. Otherwise, she is stable with systemic signs of sepsis. She does have mild dysuria and an abnormal UA, however it appears unlikely that cystitis is driving most of her symptoms.     For IR today  Continue CAROLYN

## 2023-10-19 NOTE — H&P
Please see IR consult note dated 10/19/2023    Jessica Freeman PA-C  Interventional Radiology  Spectra [phone]  10/19/2023

## 2023-10-19 NOTE — NURSING
Nurses Note -- 4 Eyes      10/19/2023   1:19 AM      Skin assessed during: Admit      [x] No Altered Skin Integrity Present    []Prevention Measures Documented      [] Yes- Altered Skin Integrity Present or Discovered   [] LDA Added if Not in Epic (Describe Wound)   [] New Altered Skin Integrity was Present on Admit and Documented in LDA   [] Wound Image Taken    Wound Care Consulted? No    Attending Nurse:  Alexandrea alvarez RN    Second RN/Staff Member:   Shirley Kong RN

## 2023-10-19 NOTE — PLAN OF CARE
[address] [facility] [facility]  Initial Discharge Assessment       Primary Care Provider: Mukul Davison FNP    Admission Diagnosis: Abdominal wall fluid collections [R18.8]    Admission Date: 10/18/2023  Expected Discharge Date: 10/21/2023    Transition of Care Barriers: None    Payor: MEDICAID / Plan: [place] [employer] / Product Type: Managed Medicaid /     Extended Emergency Contact Information  Primary Emergency Contact: Joselito Lindsey   [place]  Mobile Phone: [phone]  Relation: Brother  Preferred language: English   needed? No    Discharge Plan A: Home with family  Discharge Plan B: Home      [id number] [place], [place] - [address]  [address]  [facility] [address]  Phone: [phone] Fax: [phone]      Initial Assessment (most recent)       Adult Discharge Assessment - 10/19/23 1335          Discharge Assessment    Assessment Type Discharge Planning Assessment     Confirmed/corrected address, phone number and insurance Yes     Confirmed Demographics Correct on Facesheet     Source of Information patient     When was your last doctors appointment? 10/02/23     Does patient/caregiver understand observation status Yes     Communicated PA with patient/caregiver Yes     Reason For Admission See chart     People in Home alone     Facility Arrived From: ED     Do you expect to return to your current living situation? Yes     Do you have help at home or someone to help you manage your care at home? Yes     Who are your caregiver(s) and their phone number(s)? See demo.     Prior to hospitilization cognitive status: Alert/Oriented     Current cognitive status: Alert/Oriented     Home Accessibility wheelchair accessible     Equipment Currently Used at Home none     Readmission within 30 days? No     Patient currently being followed by outpatient case management? No     Do you currently have service(s) that help you manage your care  at home? No     Do you take prescription medications? Yes     Do you have prescription coverage? Yes     Coverage [employer] [place] [facility]     Do you have any problems affording any of your prescribed medications? No     Is the patient taking medications as prescribed? yes     How do you get to doctors appointments? car, drives self     Are you on dialysis? No     Do you take coumadin? No     DME Needed Upon Discharge  none     Discharge Plan discussed with: Patient     Transition of Care Barriers None     Discharge Plan A Home with family     Discharge Plan B Home        Physical Activity    On average, how many days per week do you engage in moderate to strenuous exercise (like a brisk walk)? 0 days     On average, how many minutes do you engage in exercise at this level? 0 min        Financial Resource Strain    How hard is it for you to pay for the very basics like food, housing, medical care, and heating? Somewhat hard        Housing Stability    In the last 12 months, was there a time when you were not able to pay the mortgage or rent on time? No     In the last 12 months, was there a time when you did not have a steady place to sleep or slept in a shelter (including now)? No        Transportation Needs    In the past 12 months, has lack of transportation kept you from medical appointments or from getting medications? No     In the past 12 months, has lack of transportation kept you from meetings, work, or from getting things needed for daily living? No        Food Insecurity    Within the past 12 months, you worried that your food would run out before you got the money to buy more. Never true     Within the past 12 months, the food you bought just didn't last and you didn't have money to get more. Never true        Stress    Do you feel stress - tense, restless, nervous, or anxious, or unable to sleep at night because your mind is troubled all the time - these days? To some extent        Social  Connections    In a typical week, how many times do you talk on the phone with family, friends, or neighbors? Three times a week     How often do you get together with friends or relatives? Three times a week     How often do you attend Sabianist or Buddhism services? More than 4 times per year     Do you belong to any clubs or organizations such as Sabianist groups, unions, fraternal or athletic groups, or school groups? Yes     How often do you attend meetings of the clubs or organizations you belong to? More than 4 times per year     Are you , , , , never , or living with a partner? Patient refused        Alcohol Use    Q1: How often do you have a drink containing alcohol? Never     Q2: How many drinks containing alcohol do you have on a typical day when you are drinking? Patient does not drink     Q3: How often do you have six or more drinks on one occasion? Never                      Spoke to pt. Pt lives at home with family. Post hospital  stay family will be pt support person and pt. has transportation at d/c with family. There have been no hospitalizations within the last 30 days per pt. Verified pt PCP and preferred pharmacy. Pt stated not on Coumadin and is not receiving dialysis. All questions answered regarding case management/ discharge planning , pt verbalized understanding. Discharge booklet with SW contact information given to pt.     Mi Hartley LCSW  Case Management/[facility]  [phone]

## 2023-10-20 VITALS
RESPIRATION RATE: 18 BRPM | HEART RATE: 70 BPM | DIASTOLIC BLOOD PRESSURE: 58 MMHG | BODY MASS INDEX: 43.66 KG/M2 | HEIGHT: 64 IN | TEMPERATURE: 98 F | OXYGEN SATURATION: 97 % | WEIGHT: 255.75 LBS | SYSTOLIC BLOOD PRESSURE: 103 MMHG

## 2023-10-20 LAB
ANION GAP SERPL CALC-SCNC: 8 MMOL/L (ref 8–16)
BASOPHILS # BLD AUTO: 0.03 K/UL (ref 0–0.2)
BASOPHILS NFR BLD: 0.4 % (ref 0–1.9)
BUN SERPL-MCNC: 17 MG/DL (ref 6–20)
CALCIUM SERPL-MCNC: 9.3 MG/DL (ref 8.7–10.5)
CHLORIDE SERPL-SCNC: 107 MMOL/L (ref 95–110)
CO2 SERPL-SCNC: 23 MMOL/L (ref 23–29)
CREAT SERPL-MCNC: 0.9 MG/DL (ref 0.5–1.4)
CRP SERPL-MCNC: 151.8 MG/L (ref 0–8.2)
DIFFERENTIAL METHOD: ABNORMAL
EOSINOPHIL # BLD AUTO: 0.3 K/UL (ref 0–0.5)
EOSINOPHIL NFR BLD: 3.5 % (ref 0–8)
ERYTHROCYTE [DISTWIDTH] IN BLOOD BY AUTOMATED COUNT: 12.8 % (ref 11.5–14.5)
EST. GFR  (NO RACE VARIABLE): >60 ML/MIN/1.73 M^2
GLUCOSE SERPL-MCNC: 103 MG/DL (ref 70–110)
HCT VFR BLD AUTO: 36.6 % (ref 37–48.5)
HGB BLD-MCNC: 12 G/DL (ref 12–16)
IMM GRANULOCYTES # BLD AUTO: 0.02 K/UL (ref 0–0.04)
IMM GRANULOCYTES NFR BLD AUTO: 0.3 % (ref 0–0.5)
LYMPHOCYTES # BLD AUTO: 1.3 K/UL (ref 1–4.8)
LYMPHOCYTES NFR BLD: 17.8 % (ref 18–48)
MCH RBC QN AUTO: 29.2 PG (ref 27–31)
MCHC RBC AUTO-ENTMCNC: 32.8 G/DL (ref 32–36)
MCV RBC AUTO: 89 FL (ref 82–98)
MONOCYTES # BLD AUTO: 0.6 K/UL (ref 0.3–1)
MONOCYTES NFR BLD: 8.6 % (ref 4–15)
NEUTROPHILS # BLD AUTO: 5.2 K/UL (ref 1.8–7.7)
NEUTROPHILS NFR BLD: 69.4 % (ref 38–73)
NRBC BLD-RTO: 0 /100 WBC
PLATELET # BLD AUTO: 237 K/UL (ref 150–450)
PMV BLD AUTO: 10.5 FL (ref 9.2–12.9)
POTASSIUM SERPL-SCNC: 4.3 MMOL/L (ref 3.5–5.1)
RBC # BLD AUTO: 4.11 M/UL (ref 4–5.4)
SODIUM SERPL-SCNC: 138 MMOL/L (ref 136–145)
WBC # BLD AUTO: 7.43 K/UL (ref 3.9–12.7)

## 2023-10-20 PROCEDURE — 80048 BASIC METABOLIC PNL TOTAL CA: CPT | Performed by: STUDENT IN AN ORGANIZED HEALTH CARE EDUCATION/TRAINING PROGRAM

## 2023-10-20 PROCEDURE — 85025 COMPLETE CBC W/AUTO DIFF WBC: CPT | Performed by: STUDENT IN AN ORGANIZED HEALTH CARE EDUCATION/TRAINING PROGRAM

## 2023-10-20 PROCEDURE — 86140 C-REACTIVE PROTEIN: CPT | Performed by: SURGERY

## 2023-10-20 PROCEDURE — 36415 COLL VENOUS BLD VENIPUNCTURE: CPT | Performed by: SURGERY

## 2023-10-20 PROCEDURE — 25000003 PHARM REV CODE 250: Performed by: SURGERY

## 2023-10-20 PROCEDURE — 25000003 PHARM REV CODE 250: Performed by: STUDENT IN AN ORGANIZED HEALTH CARE EDUCATION/TRAINING PROGRAM

## 2023-10-20 PROCEDURE — 36415 COLL VENOUS BLD VENIPUNCTURE: CPT | Performed by: STUDENT IN AN ORGANIZED HEALTH CARE EDUCATION/TRAINING PROGRAM

## 2023-10-20 RX ORDER — METRONIDAZOLE 500 MG/1
500 TABLET ORAL EVERY 8 HOURS
Status: DISCONTINUED | OUTPATIENT
Start: 2023-10-20 | End: 2023-10-20

## 2023-10-20 RX ORDER — SULFAMETHOXAZOLE AND TRIMETHOPRIM 800; 160 MG/1; MG/1
1 TABLET ORAL 2 TIMES DAILY
Status: DISCONTINUED | OUTPATIENT
Start: 2023-10-20 | End: 2023-10-20 | Stop reason: HOSPADM

## 2023-10-20 RX ORDER — SULFAMETHOXAZOLE AND TRIMETHOPRIM 800; 160 MG/1; MG/1
1 TABLET ORAL 2 TIMES DAILY
Qty: 20 TABLET | Refills: 0 | Status: SHIPPED | OUTPATIENT
Start: 2023-10-20 | End: 2023-10-30

## 2023-10-20 RX ORDER — CIPROFLOXACIN 500 MG/1
500 TABLET ORAL EVERY 12 HOURS
Status: DISCONTINUED | OUTPATIENT
Start: 2023-10-20 | End: 2023-10-20

## 2023-10-20 RX ADMIN — LEVOTHYROXINE SODIUM 50 MCG: 50 TABLET ORAL at 06:10

## 2023-10-20 RX ADMIN — ACETAMINOPHEN 650 MG: 325 TABLET ORAL at 06:10

## 2023-10-20 RX ADMIN — SULFAMETHOXAZOLE AND TRIMETHOPRIM 1 TABLET: 800; 160 TABLET ORAL at 09:10

## 2023-10-20 RX ADMIN — IBUPROFEN 600 MG: 600 TABLET ORAL at 06:10

## 2023-10-20 NOTE — NURSING
1925: Bedside Shift report received from off-going RN Dougie. Pt. Resting in bed at this time, NAD, Call bell within reach.     2115: Page to Surgical oncall provider ([place]) Pt. Requesting melatonin for sleep.

## 2023-10-20 NOTE — PLAN OF CARE
[facility]  Discharge Final Note    Primary Care Provider: Mukul Davison FNP    Expected Discharge Date: 10/20/2023    Final Discharge Note (most recent)       Final Note - 10/20/23 1013          Final Note    Assessment Type Final Discharge Note     Anticipated Discharge Disposition Home or Self Care     Hospital Resources/Appts/Education Provided Provided patient/caregiver with written discharge plan information        Post-Acute Status    Discharge Delays None known at this time                     Important Message from Medicare             Contact Info       Adelina Garces MD   Specialty: Colon and Rectal Surgery    [address] [facility] [address]   Phone: [phone]       Next Steps: Follow up on 11/6/2023          Pt d/c home with family. No d/c needs reported by medical team at this time.     Mi Hartley LCSW  Case Management/[facility]  [phone]

## 2023-10-20 NOTE — DISCHARGE SUMMARY
[facility]  Colorectal Surgery  Discharge Summary      Patient Name: Marika Lindsey  MRN: [id number]  Admission Date: 10/18/2023  Hospital Length of Stay: 2 days  Discharge Date and Time: No discharge date for patient encounter.  Attending Physician: Adelina Garces MD   Discharging Provider: Delmy Wilson MD  Primary Care Provider: Mukul Davison FNP     HPI:  No notes on file    * No surgery found *     Hospital Course:  Patient admitted 10/18/2023 and underwent IR drainage and drain placement of subcutaneous fluid collection 10/19. She tolerated this procedure well. She was suitable for discharge 10/20. She will discharge with PO antibiotics to cover subcutaneous abscess and UTI. She will discharge with the drain in place and follow up with Dr. Garces in clinic 11/06.       Goals of Care Treatment Preferences:  Code Status: Full Code      Consults (From admission, onward)        Status Ordering Provider     Inpatient consult to Interventional Radiology  Once        Provider:  (Not yet assigned)    Completed YANELI GOMEZ     Inpatient consult to Colorectal Surgery  Once        Provider:  (Not yet assigned)    Completed YANELI GOMEZ          Significant Diagnostic Studies: N/A  See HPI and hospital course    Pending Diagnostic Studies:     Procedure Component Value Units Date/Time    Basic metabolic panel [[id number]] Collected: 10/20/23 0744    Order Status: Sent Lab Status: In process Updated: 10/20/23 0744    Specimen: Blood     CBC auto differential [[id number]] Collected: 10/20/23 0744    Order Status: Sent Lab Status: In process Updated: 10/20/23 0744    Specimen: Blood         Final Active Diagnoses:    Diagnosis Date Noted POA    PRINCIPAL PROBLEM:  Abdominal wall fluid collections [R18.8] 10/18/2023 Yes    Hx of diverticulitis of colon [Z87.19] 10/19/2023 Not Applicable    Cystitis [N30.90] 10/19/2023 Yes    Severe obesity (BMI >= 40) [E66.01] 05/05/2023 Yes      Problems Resolved  During this Admission:      Discharged Condition: good    Disposition: Home or Self Care    Follow Up:   Follow-up Information     Adelina Garces MD Follow up on 11/6/2023.    Specialty: Colon and Rectal Surgery  Contact information:  [address]  [facility] [id number] [phone]                       Patient Instructions:      Notify your health care provider if you experience any of the following:  increased confusion or weakness     Notify your health care provider if you experience any of the following:  persistent dizziness, light-headedness, or visual disturbances     Notify your health care provider if you experience any of the following:  worsening rash     Notify your health care provider if you experience any of the following:  severe persistent headache     Notify your health care provider if you experience any of the following:  difficulty breathing or increased cough     Notify your health care provider if you experience any of the following:  redness, tenderness, or signs of infection (pain, swelling, redness, odor or green/yellow discharge around incision site)     Notify your health care provider if you experience any of the following:  severe uncontrolled pain     Notify your health care provider if you experience any of the following:  persistent nausea and vomiting or diarrhea     Notify your health care provider if you experience any of the following:  temperature >100.4     Activity as tolerated     Medications:  Reconciled Home Medications:      Medication List      START taking these medications    sulfamethoxazole-trimethoprim 800-160mg 800-160 mg Tab  Commonly known as: BACTRIM DS  Take 1 tablet by mouth 2 (two) times daily. for 10 days        CONTINUE taking these medications    hydrocortisone 2.5 % rectal cream  Commonly known as: ANUSOL-HC  Place rectally 2 (two) times daily.     levothyroxine 50 MCG tablet  Commonly known as: SYNTHROID  Take 1 tablet (50 mcg total) by mouth  before breakfast.     multivitamin capsule  Take 1 capsule by mouth once daily.        STOP taking these medications    ciprofloxacin HCl 500 MG tablet  Commonly known as: CIPRO     metroNIDAZOLE 500 MG tablet  Commonly known as: RORY Wilson MD  Colorectal Surgery  [facility], [place]

## 2023-10-20 NOTE — PLAN OF CARE
Pt at this time discharge pt is alert and oriented X4 reviewed instructions, medications reviewed and delivered, and follow-up. Left arm PIV dc'd placed coban no bleeding noted instructed to remove at home. Transport called. Pt self voided and had 1 BM this am. No complaints of pain, consumed am meal tolerated well, VERITO care instructions reviewed pt verbalized understanding of all instructions. VERITO device intact and patent. Abdominal binder given and placed.

## 2023-10-20 NOTE — PLAN OF CARE
I have reviewed the chart of Marika Lindsey and collaborated with Adelina Garces MD in the care of the patient who is hospitalized for the following:    Active Hospital Problems    Diagnosis    *Abdominal wall fluid collections    Hx of diverticulitis of colon    Cystitis    Severe obesity (BMI >= 40)          I have reviewed the Marika Lindsey with the multidisciplinary team during discharge huddle.       Venkat Ceron PA-C  Unit Based [facility]

## 2023-10-20 NOTE — HOSPITAL COURSE
Patient admitted 10/18/2023 and underwent IR drainage and drain placement of subcutaneous fluid collection 10/19. She tolerated this procedure well. She was suitable for discharge 10/20. She will discharge with PO antibiotics to cover subcutaneous abscess and UTI. She will discharge with the drain in place and follow up with Dr. Garces in clinic 11/06.

## 2023-10-21 LAB
BACTERIA SPEC AEROBE CULT: ABNORMAL
BACTERIA UR CULT: ABNORMAL

## 2023-10-23 LAB
BACTERIA BLD CULT: NORMAL
BACTERIA BLD CULT: NORMAL
BACTERIA SPEC ANAEROBE CULT: NORMAL

## 2023-10-25 ENCOUNTER — PATIENT MESSAGE (OUTPATIENT)
Dept: SURGERY | Facility: CLINIC | Age: 53
End: 2023-10-25
Payer: MEDICAID

## 2023-11-02 ENCOUNTER — TELEPHONE (OUTPATIENT)
Dept: SURGERY | Facility: HOSPITAL | Age: 53
End: 2023-11-02
Payer: COMMERCIAL

## 2023-11-06 ENCOUNTER — OFFICE VISIT (OUTPATIENT)
Dept: SURGERY | Facility: CLINIC | Age: 53
End: 2023-11-06
Payer: COMMERCIAL

## 2023-11-06 VITALS
SYSTOLIC BLOOD PRESSURE: 106 MMHG | BODY MASS INDEX: 45.39 KG/M2 | RESPIRATION RATE: 19 BRPM | HEART RATE: 96 BPM | WEIGHT: 265.88 LBS | HEIGHT: 64 IN | DIASTOLIC BLOOD PRESSURE: 60 MMHG | OXYGEN SATURATION: 96 %

## 2023-11-06 DIAGNOSIS — R18.8 ABDOMINAL WALL FLUID COLLECTIONS: Primary | ICD-10-CM

## 2023-11-06 PROCEDURE — 3078F DIAST BP <80 MM HG: CPT | Mod: CPTII,S$GLB,, | Performed by: SURGERY

## 2023-11-06 PROCEDURE — 1111F PR DISCHARGE MEDS RECONCILED W/ CURRENT OUTPATIENT MED LIST: ICD-10-PCS | Mod: CPTII,S$GLB,, | Performed by: SURGERY

## 2023-11-06 PROCEDURE — 99213 OFFICE O/P EST LOW 20 MIN: CPT | Mod: S$GLB,,, | Performed by: SURGERY

## 2023-11-06 PROCEDURE — 3074F PR MOST RECENT SYSTOLIC BLOOD PRESSURE < 130 MM HG: ICD-10-PCS | Mod: CPTII,S$GLB,, | Performed by: SURGERY

## 2023-11-06 PROCEDURE — 1159F PR MEDICATION LIST DOCUMENTED IN MEDICAL RECORD: ICD-10-PCS | Mod: CPTII,S$GLB,, | Performed by: SURGERY

## 2023-11-06 PROCEDURE — 3008F BODY MASS INDEX DOCD: CPT | Mod: CPTII,S$GLB,, | Performed by: SURGERY

## 2023-11-06 PROCEDURE — 3074F SYST BP LT 130 MM HG: CPT | Mod: CPTII,S$GLB,, | Performed by: SURGERY

## 2023-11-06 PROCEDURE — 3044F HG A1C LEVEL LT 7.0%: CPT | Mod: CPTII,S$GLB,, | Performed by: SURGERY

## 2023-11-06 PROCEDURE — 1159F MED LIST DOCD IN RCRD: CPT | Mod: CPTII,S$GLB,, | Performed by: SURGERY

## 2023-11-06 PROCEDURE — 3078F PR MOST RECENT DIASTOLIC BLOOD PRESSURE < 80 MM HG: ICD-10-PCS | Mod: CPTII,S$GLB,, | Performed by: SURGERY

## 2023-11-06 PROCEDURE — 3044F PR MOST RECENT HEMOGLOBIN A1C LEVEL <7.0%: ICD-10-PCS | Mod: CPTII,S$GLB,, | Performed by: SURGERY

## 2023-11-06 PROCEDURE — 99999 PR PBB SHADOW E&M-EST. PATIENT-LVL III: ICD-10-PCS | Mod: PBBFAC,,, | Performed by: SURGERY

## 2023-11-06 PROCEDURE — 1111F DSCHRG MED/CURRENT MED MERGE: CPT | Mod: CPTII,S$GLB,, | Performed by: SURGERY

## 2023-11-06 PROCEDURE — 99213 PR OFFICE/OUTPT VISIT, EST, LEVL III, 20-29 MIN: ICD-10-PCS | Mod: S$GLB,,, | Performed by: SURGERY

## 2023-11-06 PROCEDURE — 3008F PR BODY MASS INDEX (BMI) DOCUMENTED: ICD-10-PCS | Mod: CPTII,S$GLB,, | Performed by: SURGERY

## 2023-11-06 PROCEDURE — 99999 PR PBB SHADOW E&M-EST. PATIENT-LVL III: CPT | Mod: PBBFAC,,, | Performed by: SURGERY

## 2023-11-06 NOTE — PROGRESS NOTES
Colon & Rectal Surgery Clinic Follow Up    HPI:   Marika Lindsey is a 52 y.o. female who presents for follow up of after hospitalization for abdominal wall abscess and IR drainage.  Drain has been < 30 cc/day for a few days.  Reports wound around drain.        Objective:   Vitals:    11/06/23 1350   BP: 106/60   Pulse: 96   Resp: 19        Physical Exam   Gen: well developed female, NAD  HEENT: normocephalic, atraumatic, PERRL, EOMI   CV: RRR, no murmurs  Resp: nonlabored, CTAB   Abd: soft, LUQ ostomy site well healed, midline incision well healed without erythema or drainage, drain site with skin erosion and scant drainage   MSK: no gross deformities, no cyanosis     Assessment and Plan:   Marika Lindsey  is a 52 y.o. female who presents for follow up of abdominal wall abscess    - drain removed in clinic  - local wound care for drain site, keep clean and dry.  Okay for medihoney.   - follow up in 1 month, may require interval CT depending on status       Adelina Garces MD  Staff Surgeon   Colon & Rectal Surgery

## 2023-11-29 ENCOUNTER — PATIENT MESSAGE (OUTPATIENT)
Dept: SURGERY | Facility: CLINIC | Age: 53
End: 2023-11-29
Payer: COMMERCIAL

## 2023-12-07 ENCOUNTER — TELEPHONE (OUTPATIENT)
Dept: SURGERY | Facility: CLINIC | Age: 53
End: 2023-12-07
Payer: COMMERCIAL

## 2023-12-08 ENCOUNTER — OFFICE VISIT (OUTPATIENT)
Dept: SURGERY | Facility: CLINIC | Age: 53
End: 2023-12-08
Payer: COMMERCIAL

## 2023-12-08 VITALS
RESPIRATION RATE: 19 BRPM | WEIGHT: 272.69 LBS | HEART RATE: 72 BPM | BODY MASS INDEX: 46.55 KG/M2 | OXYGEN SATURATION: 97 % | SYSTOLIC BLOOD PRESSURE: 134 MMHG | DIASTOLIC BLOOD PRESSURE: 79 MMHG | HEIGHT: 64 IN

## 2023-12-08 DIAGNOSIS — R18.8 ABDOMINAL WALL FLUID COLLECTIONS: Primary | ICD-10-CM

## 2023-12-08 PROCEDURE — 99213 OFFICE O/P EST LOW 20 MIN: CPT | Mod: S$GLB,,, | Performed by: SURGERY

## 2023-12-08 PROCEDURE — 3078F PR MOST RECENT DIASTOLIC BLOOD PRESSURE < 80 MM HG: ICD-10-PCS | Mod: CPTII,S$GLB,, | Performed by: SURGERY

## 2023-12-08 PROCEDURE — 3008F BODY MASS INDEX DOCD: CPT | Mod: CPTII,S$GLB,, | Performed by: SURGERY

## 2023-12-08 PROCEDURE — 1159F MED LIST DOCD IN RCRD: CPT | Mod: CPTII,S$GLB,, | Performed by: SURGERY

## 2023-12-08 PROCEDURE — 3075F SYST BP GE 130 - 139MM HG: CPT | Mod: CPTII,S$GLB,, | Performed by: SURGERY

## 2023-12-08 PROCEDURE — 99999 PR PBB SHADOW E&M-EST. PATIENT-LVL III: ICD-10-PCS | Mod: PBBFAC,,, | Performed by: SURGERY

## 2023-12-08 PROCEDURE — 3044F HG A1C LEVEL LT 7.0%: CPT | Mod: CPTII,S$GLB,, | Performed by: SURGERY

## 2023-12-08 PROCEDURE — 99999 PR PBB SHADOW E&M-EST. PATIENT-LVL III: CPT | Mod: PBBFAC,,, | Performed by: SURGERY

## 2023-12-08 PROCEDURE — 3075F PR MOST RECENT SYSTOLIC BLOOD PRESS GE 130-139MM HG: ICD-10-PCS | Mod: CPTII,S$GLB,, | Performed by: SURGERY

## 2023-12-08 PROCEDURE — 3008F PR BODY MASS INDEX (BMI) DOCUMENTED: ICD-10-PCS | Mod: CPTII,S$GLB,, | Performed by: SURGERY

## 2023-12-08 PROCEDURE — 3044F PR MOST RECENT HEMOGLOBIN A1C LEVEL <7.0%: ICD-10-PCS | Mod: CPTII,S$GLB,, | Performed by: SURGERY

## 2023-12-08 PROCEDURE — 1159F PR MEDICATION LIST DOCUMENTED IN MEDICAL RECORD: ICD-10-PCS | Mod: CPTII,S$GLB,, | Performed by: SURGERY

## 2023-12-08 PROCEDURE — 3078F DIAST BP <80 MM HG: CPT | Mod: CPTII,S$GLB,, | Performed by: SURGERY

## 2023-12-08 PROCEDURE — 99213 PR OFFICE/OUTPT VISIT, EST, LEVL III, 20-29 MIN: ICD-10-PCS | Mod: S$GLB,,, | Performed by: SURGERY

## 2023-12-08 NOTE — PROGRESS NOTES
Colon & Rectal Surgery Clinic Follow Up    HPI:   Marika Lindsey is a 53 y.o. female who presents for follow up of recurrent abdominal wall abscesses after recent drain placement in October.  Wound healed.  Patient had spontaneous drainage from wound last week.  Now healed.       Objective:   Vitals:    12/08/23 1318   BP: 134/79   Pulse: 72   Resp: 19        Physical Exam   Gen: well developed female, NAD  HEENT: normocephalic, atraumatic, PERRL, EOMI   CV: RRR, no murmurs  Resp: nonlabored, CTAB   Abd: soft, NTND, drain site well healed, no external erythema or induration   MSK: no gross deformities, no cyanosis or edema     Assessment and Plan:   Marika Lindsey  is a 53 y.o. female who presents for follow up of abdominal wall abscess    - concern for suture granuloma or chronic abscess cavity that is precluding healing.    - discussed possible need for surgical intervention given recurrence  - will obtain CT in 3-4 weeks and follow up in January.    - call if symptoms return sooner, will try to obtain scan when symptomatic to best localize cavity       Adelina Garces MD  Staff Surgeon   Colon & Rectal Surgery

## 2023-12-26 ENCOUNTER — HOSPITAL ENCOUNTER (OUTPATIENT)
Dept: RADIOLOGY | Facility: HOSPITAL | Age: 53
Discharge: HOME | End: 2023-12-26
Attending: SURGERY
Payer: COMMERCIAL

## 2023-12-26 DIAGNOSIS — R18.8 ABDOMINAL WALL FLUID COLLECTIONS: ICD-10-CM

## 2023-12-26 PROCEDURE — 74177 CT ABD & PELVIS W/CONTRAST: CPT | Mod: TC

## 2023-12-26 PROCEDURE — 74177 CT ABDOMEN PELVIS WITH IV CONTRAST: ICD-10-PCS | Mod: 26,,, | Performed by: RADIOLOGY

## 2023-12-26 PROCEDURE — 25500020 PHARM REV CODE 255: Performed by: SURGERY

## 2023-12-26 PROCEDURE — 74177 CT ABD & PELVIS W/CONTRAST: CPT | Mod: 26,,, | Performed by: RADIOLOGY

## 2023-12-26 PROCEDURE — A9698 NON-RAD CONTRAST MATERIALNOC: HCPCS | Performed by: SURGERY

## 2023-12-26 RX ADMIN — IOHEXOL 100 ML: 350 INJECTION, SOLUTION INTRAVENOUS at 02:12

## 2023-12-26 RX ADMIN — BARIUM SULFATE 450 ML: 20 SUSPENSION ORAL at 02:12

## 2023-12-27 ENCOUNTER — PATIENT MESSAGE (OUTPATIENT)
Dept: SURGERY | Facility: CLINIC | Age: 53
End: 2023-12-27
Payer: COMMERCIAL

## 2023-12-27 DIAGNOSIS — R18.8 ABDOMINAL WALL FLUID COLLECTIONS: Primary | ICD-10-CM

## 2023-12-27 RX ORDER — SULFAMETHOXAZOLE AND TRIMETHOPRIM 800; 160 MG/1; MG/1
1 TABLET ORAL 2 TIMES DAILY
Qty: 28 TABLET | Refills: 0 | Status: SHIPPED | OUTPATIENT
Start: 2023-12-27

## 2023-12-27 NOTE — PROGRESS NOTES
Called Ms. Lindsey to discuss results of CT.  Will start antibiotics based on prior wound cultures.  Plan for operative debridement on 1/9.  Pre-op appt 1/8.

## 2024-01-05 ENCOUNTER — TELEPHONE (OUTPATIENT)
Dept: SURGERY | Facility: CLINIC | Age: 54
End: 2024-01-05
Payer: COMMERCIAL

## 2024-01-07 ENCOUNTER — ANESTHESIA EVENT (OUTPATIENT)
Dept: SURGERY | Facility: OTHER | Age: 54
End: 2024-01-07
Payer: COMMERCIAL

## 2024-01-07 NOTE — ANESTHESIA PREPROCEDURE EVALUATION
01/07/2024  Marika Lindsey is a 53 y.o., female.      Pre-op Assessment    I have reviewed the Patient Summary Reports.     I have reviewed the Nursing Notes. I have reviewed the NPO Status.   I have reviewed the Medications.     Review of Systems  Anesthesia Hx:  No problems with previous Anesthesia   History of prior surgery of interest to airway management or planning:  Previous anesthesia: General Recent surg at [facility] with general anesthesia.  Procedure performed at an [facility].      Airway issues documented on chart review include videolaryngoscope used  , view on video-laryngoscopy Grade I    Denies Family Hx of Anesthesia complications.    Denies Personal Hx of Anesthesia complications.                    Social:  Non-Smoker       Hematology/Oncology:  Hematology Normal   Oncology Normal                                   EENT/Dental:  EENT/Dental Normal           Cardiovascular:                hyperlipidemia                             Pulmonary:  Pulmonary Normal                       Renal/:  Renal/ Normal                 Hepatic/GI:     GERD, well controlled   perf diverticula, multiple surgeries following, had colostomy closure 6/2022          Musculoskeletal:  Musculoskeletal Normal                Neurological:  Neurology Normal                                      Endocrine:   Hypothyroidism        Morbid Obesity / BMI > 40  Dermatological:  Skin Normal    Psych:  Psychiatric Normal                    Physical Exam  General: Cooperative, Alert and Oriented    Airway:  Mallampati: II   Mouth Opening: Normal  TM Distance: Normal  Neck ROM: Normal ROM    Dental:  Intact  Chipped upper front      Anesthesia Plan  Type of Anesthesia, risks & benefits discussed:    Anesthesia Type: Gen ETT, Gen Supraglottic Airway  Intra-op Monitoring Plan: Standard ASA Monitors  Post Op Pain  Control Plan: multimodal analgesia and IV/PO Opioids PRN  Induction:  IV  Airway Plan: Video  Informed Consent: Informed consent signed with the Patient and all parties understand the risks and agree with anesthesia plan.  All questions answered.   ASA Score: 3  Anesthesia Plan Notes: Labs today        Ready For Surgery From Anesthesia Perspective.     .

## 2024-01-08 ENCOUNTER — HOSPITAL ENCOUNTER (OUTPATIENT)
Dept: PREADMISSION TESTING | Facility: OTHER | Age: 54
Discharge: HOME | End: 2024-01-08
Attending: SURGERY
Payer: COMMERCIAL

## 2024-01-08 ENCOUNTER — PATIENT MESSAGE (OUTPATIENT)
Dept: SURGERY | Facility: CLINIC | Age: 54
End: 2024-01-08
Payer: COMMERCIAL

## 2024-01-08 VITALS
TEMPERATURE: 99 F | DIASTOLIC BLOOD PRESSURE: 86 MMHG | WEIGHT: 272 LBS | HEIGHT: 64 IN | HEART RATE: 70 BPM | RESPIRATION RATE: 18 BRPM | BODY MASS INDEX: 46.44 KG/M2 | OXYGEN SATURATION: 98 % | SYSTOLIC BLOOD PRESSURE: 140 MMHG

## 2024-01-08 DIAGNOSIS — Z01.818 PREOP TESTING: Primary | ICD-10-CM

## 2024-01-08 LAB
ANION GAP SERPL CALC-SCNC: 10 MMOL/L (ref 8–16)
BASOPHILS # BLD AUTO: 0.03 K/UL (ref 0–0.2)
BASOPHILS NFR BLD: 0.5 % (ref 0–1.9)
BUN SERPL-MCNC: 19 MG/DL (ref 6–20)
CALCIUM SERPL-MCNC: 9.3 MG/DL (ref 8.7–10.5)
CHLORIDE SERPL-SCNC: 105 MMOL/L (ref 95–110)
CO2 SERPL-SCNC: 23 MMOL/L (ref 23–29)
CREAT SERPL-MCNC: 0.9 MG/DL (ref 0.5–1.4)
DIFFERENTIAL METHOD BLD: NORMAL
EOSINOPHIL # BLD AUTO: 0.4 K/UL (ref 0–0.5)
EOSINOPHIL NFR BLD: 6.1 % (ref 0–8)
ERYTHROCYTE [DISTWIDTH] IN BLOOD BY AUTOMATED COUNT: 14.1 % (ref 11.5–14.5)
EST. GFR  (NO RACE VARIABLE): >60 ML/MIN/1.73 M^2
GLUCOSE SERPL-MCNC: 102 MG/DL (ref 70–110)
HCT VFR BLD AUTO: 42 % (ref 37–48.5)
HGB BLD-MCNC: 14 G/DL (ref 12–16)
IMM GRANULOCYTES # BLD AUTO: 0.01 K/UL (ref 0–0.04)
IMM GRANULOCYTES NFR BLD AUTO: 0.2 % (ref 0–0.5)
LYMPHOCYTES # BLD AUTO: 2.1 K/UL (ref 1–4.8)
LYMPHOCYTES NFR BLD: 35.3 % (ref 18–48)
MCH RBC QN AUTO: 30.2 PG (ref 27–31)
MCHC RBC AUTO-ENTMCNC: 33.3 G/DL (ref 32–36)
MCV RBC AUTO: 91 FL (ref 82–98)
MONOCYTES # BLD AUTO: 0.6 K/UL (ref 0.3–1)
MONOCYTES NFR BLD: 9.4 % (ref 4–15)
NEUTROPHILS # BLD AUTO: 2.9 K/UL (ref 1.8–7.7)
NEUTROPHILS NFR BLD: 48.5 % (ref 38–73)
NRBC BLD-RTO: 0 /100 WBC
PLATELET # BLD AUTO: 244 K/UL (ref 150–450)
PMV BLD AUTO: 9.7 FL (ref 9.2–12.9)
POTASSIUM SERPL-SCNC: 4.8 MMOL/L (ref 3.5–5.1)
RBC # BLD AUTO: 4.63 M/UL (ref 4–5.4)
SODIUM SERPL-SCNC: 138 MMOL/L (ref 136–145)
WBC # BLD AUTO: 6.04 K/UL (ref 3.9–12.7)

## 2024-01-08 PROCEDURE — 85025 COMPLETE CBC W/AUTO DIFF WBC: CPT | Performed by: ANESTHESIOLOGY

## 2024-01-08 PROCEDURE — 80048 BASIC METABOLIC PNL TOTAL CA: CPT | Performed by: ANESTHESIOLOGY

## 2024-01-08 PROCEDURE — 36415 COLL VENOUS BLD VENIPUNCTURE: CPT | Performed by: ANESTHESIOLOGY

## 2024-01-08 RX ORDER — LIDOCAINE HYDROCHLORIDE 10 MG/ML
0.5 INJECTION, SOLUTION EPIDURAL; INFILTRATION; INTRACAUDAL; PERINEURAL ONCE
Status: CANCELLED | OUTPATIENT
Start: 2024-01-08 | End: 2024-01-08

## 2024-01-08 RX ORDER — SODIUM CHLORIDE, SODIUM LACTATE, POTASSIUM CHLORIDE, CALCIUM CHLORIDE 600; 310; 30; 20 MG/100ML; MG/100ML; MG/100ML; MG/100ML
INJECTION, SOLUTION INTRAVENOUS CONTINUOUS
Status: CANCELLED | OUTPATIENT
Start: 2024-01-08

## 2024-01-08 NOTE — DISCHARGE INSTRUCTIONS
Information to Prepare you for your Surgery    PRE-ADMIT TESTING   [address] [facility]       Your surgery has been scheduled at [facility]. We are pleased to have the opportunity to serve you. For Further Information please call [phone].    On the day of surgery please report to the Information Desk on the 1st floor.    CONTACT YOUR PHYSICIAN'S OFFICE THE DAY PRIOR TO YOUR SURGERY TO OBTAIN YOUR ARRIVAL TIME.     The evening before surgery do not eat anything after 9 p.m. ( this includes hard candy, chewing gum and mints).  You may only have GATORADE, POWERADE AND WATER  from 9 p.m. until you leave your home.   DO NOT DRINK ANY LIQUIDS ON THE WAY TO THE HOSPITAL.      Why does your anesthesiologist allow you to drink Gatorade/Powerade before surgery?  Gatorade/Powerade helps to increase your comfort before surgery and to decrease your nausea after surgery.   The carbohydrates in Gatorade/Powerade help reduce your body's stress response to surgery.  If you are a diabetic-drink only water prior to surgery.    Outpatient Surgery- May allow 2 adults (18 and older)/ Support Persons (1 being the designated ) for all surgical/procedural patients. A breastfeeding mother will be allowed her infant and 2 adult Support Persons. No one under the age of 18 will be allowed in the building.      SPECIAL MEDICATION INSTRUCTIONS: TAKE medications checked off by the Anesthesiologist on your Medication List.    Surgery Patients:  If you take ASPIRIN - Your PHYSICIAN/SURGEON will need to inform you IF/OR when you need to stop taking aspirin prior to your surgery.     Starting the week prior to surgery, do not take any medications containing IBUPROFEN or NSAIDS (Advil, Aleve, BC, Goody's, Ketorolac, Meloxicam, Mobic, Motrin, Naproxen, Toradol, etc).  If you are not sure if you should take a medicine please call your surgeon's office.  You may take Tylenol.    Do Not Wear any make-up  (especially eye make-up) to surgery. Please remove any false eyelashes or eyelash extensions. If you arrive the day of surgery with makeup/eyelashes on you will be required to remove prior to surgery. (There is a risk of corneal abrasions if eye makeup/eyelash extensions are not removed)    Leave all valuables at home.   Do Not wear any jewelry or watches, including any metal in body piercings. Jewelry must be removed prior to coming to the hospital.  There is a possibility that rings that are unable to be removed may be cut off if they are on the surgical extremity.    Please remove all hair extensions, wigs, clips and any other metal accessories/ ornaments from your hair.  These items may pose a flammable/fire risk in Surgery and must be removed.    Do not shave your surgical area at least 5 days prior to your surgery. The surgical prep will be performed at the hospital according to Infection Control regulations.    Contact Lens must be removed before surgery. Either do not wear the contact lens or bring a case and solution for storage.  Please bring a container for eyeglasses or dentures as required.  Bring any paperwork your physician has provided, such as consent forms,  history and physicals, doctor's orders, etc.   Bring comfortable clothes that are loose fitting to wear upon discharge. Take into consideration the type of surgery being performed.  Maintain your diet as advised per your physician the day prior to surgery.    Adequate rest the night before surgery is advised.   Park in the Parking lot behind the hospital or in the [place] [facility] across the street from the parking lot. Parking is complimentary.  If you will be discharged the same day as your procedure, please arrange for a responsible adult to drive you home or to accompany you if traveling by taxi.   YOU WILL NOT BE PERMITTED TO DRIVE OR TO LEAVE THE HOSPITAL ALONE AFTER SURGERY.   If you are being discharged the same day, it is  strongly recommended that you arrange for someone to remain with you for the first 24 hrs following your surgery.    The Surgeon will speak to your family/visitor after your surgery regarding the outcome of your surgery and post op care.  The Surgeon may speak to you after your surgery, but there is a possibility you may not remember the details.  Please check with your family members regarding the conversation with the Surgeon.    We strongly recommend whoever is bringing you home be present for discharge instructions.  This will ensure a thorough understanding for your post op home care.              Bathing Instructions with Hibiclens  Shower the evening before and morning of your procedure with Chlorhexidine (Hibiclens)    Do not use Chlorhexidine on your face or genitals. Do not get in your eyes.  Wash your face with water and your regular face wash/soap  Use your regular shampoo  Apply Chlorhexidine (Hibiclens) directly on your skin or on a wet washcloth and wash gently. When showering: Move away from the shower stream when applying Chlorhexidine (Hibiclens) to avoid rinsing off too soon.  Rinse thoroughly with warm water  Do not dilute Chlorhexidine (Hibiclens)   Dry off as usual, do not use any deodorant, powder, body lotions, perfume, after shave or cologne.

## 2024-01-09 ENCOUNTER — HOSPITAL ENCOUNTER (OUTPATIENT)
Facility: OTHER | Age: 54
Discharge: HOME | End: 2024-01-09
Attending: SURGERY | Admitting: SURGERY
Payer: COMMERCIAL

## 2024-01-09 ENCOUNTER — TELEPHONE (OUTPATIENT)
Dept: SURGERY | Facility: CLINIC | Age: 54
End: 2024-01-09
Payer: COMMERCIAL

## 2024-01-09 ENCOUNTER — ANESTHESIA (OUTPATIENT)
Dept: SURGERY | Facility: OTHER | Age: 54
End: 2024-01-09
Payer: COMMERCIAL

## 2024-01-09 VITALS
HEART RATE: 68 BPM | TEMPERATURE: 99 F | RESPIRATION RATE: 18 BRPM | DIASTOLIC BLOOD PRESSURE: 60 MMHG | SYSTOLIC BLOOD PRESSURE: 120 MMHG | OXYGEN SATURATION: 98 %

## 2024-01-09 DIAGNOSIS — L02.211 ABDOMINAL WALL ABSCESS: ICD-10-CM

## 2024-01-09 DIAGNOSIS — L02.91 ABSCESS: Primary | ICD-10-CM

## 2024-01-09 DIAGNOSIS — R18.8 ABDOMINAL WALL FLUID COLLECTIONS: ICD-10-CM

## 2024-01-09 PROCEDURE — 71000033 HC RECOVERY, INTIAL HOUR: Performed by: SURGERY

## 2024-01-09 PROCEDURE — 71000015 HC POSTOP RECOV 1ST HR: Performed by: SURGERY

## 2024-01-09 PROCEDURE — 88305 TISSUE EXAM BY PATHOLOGIST: CPT | Performed by: STUDENT IN AN ORGANIZED HEALTH CARE EDUCATION/TRAINING PROGRAM

## 2024-01-09 PROCEDURE — D9220A PRA ANESTHESIA: Mod: CRNA,,, | Performed by: NURSE ANESTHETIST, CERTIFIED REGISTERED

## 2024-01-09 PROCEDURE — 36000707: Performed by: SURGERY

## 2024-01-09 PROCEDURE — 49999 UNLISTED PX ABD PERTM&OMN: CPT | Mod: ,,, | Performed by: SURGERY

## 2024-01-09 PROCEDURE — 25000003 PHARM REV CODE 250: Performed by: NURSE ANESTHETIST, CERTIFIED REGISTERED

## 2024-01-09 PROCEDURE — 63600175 PHARM REV CODE 636 W HCPCS: Performed by: NURSE ANESTHETIST, CERTIFIED REGISTERED

## 2024-01-09 PROCEDURE — D9220A PRA ANESTHESIA: Mod: ANES,,, | Performed by: ANESTHESIOLOGY

## 2024-01-09 PROCEDURE — 71000016 HC POSTOP RECOV ADDL HR: Performed by: SURGERY

## 2024-01-09 PROCEDURE — 88305 TISSUE EXAM BY PATHOLOGIST: CPT | Mod: 26,,, | Performed by: STUDENT IN AN ORGANIZED HEALTH CARE EDUCATION/TRAINING PROGRAM

## 2024-01-09 PROCEDURE — 63600175 PHARM REV CODE 636 W HCPCS: Performed by: ANESTHESIOLOGY

## 2024-01-09 PROCEDURE — 63600175 PHARM REV CODE 636 W HCPCS: Mod: JZ,JG | Performed by: SURGERY

## 2024-01-09 PROCEDURE — 37000009 HC ANESTHESIA EA ADD 15 MINS: Performed by: SURGERY

## 2024-01-09 PROCEDURE — 27201423 OPTIME MED/SURG SUP & DEVICES STERILE SUPPLY: Performed by: SURGERY

## 2024-01-09 PROCEDURE — 37000008 HC ANESTHESIA 1ST 15 MINUTES: Performed by: SURGERY

## 2024-01-09 PROCEDURE — 36000706: Performed by: SURGERY

## 2024-01-09 RX ORDER — SODIUM CHLORIDE 0.9 % (FLUSH) 0.9 %
3 SYRINGE (ML) INJECTION
Status: DISCONTINUED | OUTPATIENT
Start: 2024-01-09 | End: 2024-01-09 | Stop reason: HOSPADM

## 2024-01-09 RX ORDER — METRONIDAZOLE 500 MG/100ML
500 INJECTION, SOLUTION INTRAVENOUS
Status: DISCONTINUED | OUTPATIENT
Start: 2024-01-09 | End: 2024-01-09 | Stop reason: HOSPADM

## 2024-01-09 RX ORDER — LIDOCAINE HYDROCHLORIDE 20 MG/ML
INJECTION, SOLUTION EPIDURAL; INFILTRATION; INTRACAUDAL; PERINEURAL
Status: DISCONTINUED | OUTPATIENT
Start: 2024-01-09 | End: 2024-01-09

## 2024-01-09 RX ORDER — PHENYLEPHRINE HYDROCHLORIDE 10 MG/ML
INJECTION INTRAVENOUS
Status: DISCONTINUED | OUTPATIENT
Start: 2024-01-09 | End: 2024-01-09

## 2024-01-09 RX ORDER — LIDOCAINE HYDROCHLORIDE 10 MG/ML
0.5 INJECTION, SOLUTION EPIDURAL; INFILTRATION; INTRACAUDAL; PERINEURAL ONCE
Status: DISCONTINUED | OUTPATIENT
Start: 2024-01-09 | End: 2024-01-09 | Stop reason: HOSPADM

## 2024-01-09 RX ORDER — SODIUM CHLORIDE, SODIUM LACTATE, POTASSIUM CHLORIDE, CALCIUM CHLORIDE 600; 310; 30; 20 MG/100ML; MG/100ML; MG/100ML; MG/100ML
INJECTION, SOLUTION INTRAVENOUS CONTINUOUS
Status: DISCONTINUED | OUTPATIENT
Start: 2024-01-09 | End: 2024-01-09 | Stop reason: HOSPADM

## 2024-01-09 RX ORDER — KETOROLAC TROMETHAMINE 30 MG/ML
INJECTION, SOLUTION INTRAMUSCULAR; INTRAVENOUS
Status: DISCONTINUED | OUTPATIENT
Start: 2024-01-09 | End: 2024-01-09

## 2024-01-09 RX ORDER — HYDROMORPHONE HYDROCHLORIDE 2 MG/ML
0.4 INJECTION, SOLUTION INTRAMUSCULAR; INTRAVENOUS; SUBCUTANEOUS EVERY 5 MIN PRN
Status: DISCONTINUED | OUTPATIENT
Start: 2024-01-09 | End: 2024-01-09 | Stop reason: HOSPADM

## 2024-01-09 RX ORDER — PROPOFOL 10 MG/ML
VIAL (ML) INTRAVENOUS
Status: DISCONTINUED | OUTPATIENT
Start: 2024-01-09 | End: 2024-01-09

## 2024-01-09 RX ORDER — DEXAMETHASONE SODIUM PHOSPHATE 4 MG/ML
INJECTION, SOLUTION INTRA-ARTICULAR; INTRALESIONAL; INTRAMUSCULAR; INTRAVENOUS; SOFT TISSUE
Status: DISCONTINUED | OUTPATIENT
Start: 2024-01-09 | End: 2024-01-09

## 2024-01-09 RX ORDER — OXYCODONE HYDROCHLORIDE 5 MG/1
5 TABLET ORAL EVERY 4 HOURS PRN
Status: DISCONTINUED | OUTPATIENT
Start: 2024-01-09 | End: 2024-01-09 | Stop reason: HOSPADM

## 2024-01-09 RX ORDER — ROCURONIUM BROMIDE 10 MG/ML
INJECTION, SOLUTION INTRAVENOUS
Status: DISCONTINUED | OUTPATIENT
Start: 2024-01-09 | End: 2024-01-09

## 2024-01-09 RX ORDER — DIPHENHYDRAMINE HYDROCHLORIDE 50 MG/ML
12.5 INJECTION, SOLUTION INTRAMUSCULAR; INTRAVENOUS EVERY 30 MIN PRN
Status: DISCONTINUED | OUTPATIENT
Start: 2024-01-09 | End: 2024-01-09 | Stop reason: HOSPADM

## 2024-01-09 RX ORDER — MEPERIDINE HYDROCHLORIDE 25 MG/ML
12.5 INJECTION INTRAMUSCULAR; INTRAVENOUS; SUBCUTANEOUS ONCE AS NEEDED
Status: DISCONTINUED | OUTPATIENT
Start: 2024-01-09 | End: 2024-01-09 | Stop reason: HOSPADM

## 2024-01-09 RX ORDER — OXYCODONE HYDROCHLORIDE 5 MG/1
5 TABLET ORAL EVERY 4 HOURS PRN
Qty: 20 TABLET | Refills: 0 | Status: SHIPPED | OUTPATIENT
Start: 2024-01-09

## 2024-01-09 RX ORDER — SODIUM CHLORIDE 9 MG/ML
INJECTION, SOLUTION INTRAVENOUS CONTINUOUS
Status: DISCONTINUED | OUTPATIENT
Start: 2024-01-09 | End: 2024-01-09 | Stop reason: HOSPADM

## 2024-01-09 RX ORDER — PROCHLORPERAZINE EDISYLATE 5 MG/ML
5 INJECTION INTRAMUSCULAR; INTRAVENOUS EVERY 30 MIN PRN
Status: DISCONTINUED | OUTPATIENT
Start: 2024-01-09 | End: 2024-01-09 | Stop reason: HOSPADM

## 2024-01-09 RX ORDER — FENTANYL CITRATE 50 UG/ML
INJECTION, SOLUTION INTRAMUSCULAR; INTRAVENOUS
Status: DISCONTINUED | OUTPATIENT
Start: 2024-01-09 | End: 2024-01-09

## 2024-01-09 RX ADMIN — ROCURONIUM BROMIDE 50 MG: 10 SOLUTION INTRAVENOUS at 12:01

## 2024-01-09 RX ADMIN — SODIUM CHLORIDE, SODIUM LACTATE, POTASSIUM CHLORIDE, AND CALCIUM CHLORIDE: 600; 310; 30; 20 INJECTION, SOLUTION INTRAVENOUS at 12:01

## 2024-01-09 RX ADMIN — PROPOFOL 200 MG: 10 INJECTION, EMULSION INTRAVENOUS at 12:01

## 2024-01-09 RX ADMIN — LIDOCAINE HYDROCHLORIDE 25 MG: 20 INJECTION, SOLUTION EPIDURAL; INFILTRATION; INTRACAUDAL; PERINEURAL at 12:01

## 2024-01-09 RX ADMIN — CEFAZOLIN SODIUM 3 ML: 2 SOLUTION INTRAVENOUS at 12:01

## 2024-01-09 RX ADMIN — PHENYLEPHRINE HYDROCHLORIDE 100 MCG: 10 INJECTION INTRAVENOUS at 01:01

## 2024-01-09 RX ADMIN — DEXAMETHASONE SODIUM PHOSPHATE 4 MG: 4 INJECTION, SOLUTION INTRAMUSCULAR; INTRAVENOUS at 01:01

## 2024-01-09 RX ADMIN — PHENYLEPHRINE HYDROCHLORIDE 200 MCG: 10 INJECTION INTRAVENOUS at 12:01

## 2024-01-09 RX ADMIN — METRONIDAZOLE 500 MG: 500 INJECTION, SOLUTION INTRAVENOUS at 12:01

## 2024-01-09 RX ADMIN — CARBOXYMETHYLCELLULOSE SODIUM 2 DROP: 2.5 SOLUTION/ DROPS OPHTHALMIC at 12:01

## 2024-01-09 RX ADMIN — KETOROLAC TROMETHAMINE 30 MG: 30 INJECTION, SOLUTION INTRAMUSCULAR; INTRAVENOUS at 01:01

## 2024-01-09 RX ADMIN — SUGAMMADEX 400 MG: 100 INJECTION, SOLUTION INTRAVENOUS at 01:01

## 2024-01-09 RX ADMIN — FENTANYL CITRATE 100 MCG: 0.05 INJECTION, SOLUTION INTRAMUSCULAR; INTRAVENOUS at 12:01

## 2024-01-09 NOTE — TRANSFER OF CARE
Anesthesia Transfer of Care Note    Patient: Marika Lindsey    Procedure(s) Performed: Procedure(s) (LRB):  abdominal wall wound debridement (N/A)  INCISION AND DRAINAGE, ABDOMEN    Patient location: PACU    Anesthesia Type: general    Transport from OR: Transported from OR on 6-10 L/min O2 by face mask with adequate spontaneous ventilation    Post pain: adequate analgesia    Post assessment: no apparent anesthetic complications    Post vital signs: stable    Level of consciousness: awake    Nausea/Vomiting: no nausea/vomiting    Complications: none    Transfer of care protocol was followed    Last vitals: Visit Vitals  /64 (BP Location: Right arm, Patient Position: Lying)   Pulse 75   Temp 36.5 °C (97.7 °F) (Skin)   Resp 18   SpO2 100%   Breastfeeding No

## 2024-01-09 NOTE — PLAN OF CARE
Marika Tapia César has met all discharge criteria from Phase II. Vital Signs are stable, ambulating  without difficulty. Discharge instructions given, patient verbalized understanding. Discharged from facility via wheelchair in stable condition.

## 2024-01-09 NOTE — OP NOTE
Date of surgery: 01/09/2024    Operative Report  Pre-operative diagnosis: recurrent abdominal wall abscess  Post-operative diagnosis: Same as above    Procedure:  Incision and drainage of abdominal wall abscess with debridement of abscess cavity     Findings:  Abdominal wall abscess  Suture material in wound cavity          Surgeon: Adelina Garces MD   Assistant:  none    Indication: Ms. Lindsey is a 53 year old woman with a history of multiple abdominal operations and subsequent wound infection  who is scheduled to undergo incision and drainage of abdominal wall abscess. The benefits, risks, and alternatives were discussed with the patient, they were given the opportunity to ask questions and they elected to proceed with operative intervention after signing written consent.    Procedure:  After pre-operative assessment and review of informed consent, the patient was taken to the operating room and received general anesthesia. Pre-operative antibiotics were administered if indicated and the patient was placed in supine position. The abdomen was prepped and draped in the usual sterile fashion and a timeout was performed according to [facility] Quality and Safety guidelines.      The right abdominal wall drain site was probed and there was evacuation of purulent fluid.  The incision was enlarged and there was a large amount of granulation tissue within the cavity.  The cavity was copiously irrigated and debrided.  There was prior suture material present within the abscess cavity that was removed and sent for specimen.  There was a small fascial defect that was closed with interrupted PDS sutures.  The wound cavity was thoroughly inspected and there were no further foreign objects.  Hemostasis was obtained.  The incision was partially reapproximated using interrupted nylon sutures.  The remainder of the wound was packed with kerlix gauze and dressed with an ABD dressing.      Prior to closure and after final closure  instrument, needle, and sponge counts were correct.    The procedure was completed without complication and was well-tolerated. The patient was then brought to the post-anesthesia care unit in stable condition. I was present for the entire operation.    Complications: None  Estimated blood loss: 20 mL  Disposition: PACU    Adelina Garces MD  Staff Surgeon   Colon & Rectal Surgery

## 2024-01-09 NOTE — BRIEF OP NOTE
[facility]  Brief Operative Note    Surgery Date: 1/9/2024     Surgeon(s) and Role:     * Adelina Roy MD - Primary    Assisting Surgeon: None    Pre-op Diagnosis:  Abdominal wall fluid collections [R18.8]    Post-op Diagnosis:  Post-Op Diagnosis Codes:     * Abdominal wall fluid collections [R18.8]    Procedure(s) (LRB):  abdominal wall wound debridement (N/A)    Anesthesia: General    Operative Findings: abdominal wall abscess cavity with suture material     Estimated Blood Loss: * No values recorded between 1/9/2024 12:53 PM and 1/9/2024  1:20 PM *         Specimens:   Specimen (24h ago, onward)       Start     Ordered    01/09/24 1300  Specimen to Pathology, Surgery Other  Once        Comments: Pre-op Diagnosis: Abdominal wall fluid collections [R18.8]Procedure(s):abdominal wall wound debridement Number of specimens: 1Name of specimens: 1) abscess cavity material     References:    Click here for ordering Quick Tip   Question Answer Comment   Procedure Type: Other    Specimen Class: Routine/Screening    Which provider would you like to cc? ADELINA ROY    Release to patient Immediate        01/09/24 1301                      Discharge Note    OUTCOME: Patient tolerated treatment/procedure well without complication and is now ready for discharge.    DISPOSITION: Home or Self Care    FINAL DIAGNOSIS: abdominal wall abscess    FOLLOWUP: In clinic    DISCHARGE INSTRUCTIONS:    Take tylenol and ibuprofen for pain.  Take oxycodone for breakthrough pain.   Pack wound daily with wet to dry dressing.  Cover with dry dressing.  Remove packing and shower daily.   Call [phone] for worsening pain, fever > 101, purulent drainage from incision  Follow up in 2 weeks     Adelina Roy MD  Staff Surgeon   Colon & Rectal Surgery

## 2024-01-09 NOTE — ANESTHESIA PROCEDURE NOTES
Intubation    Date/Time: 1/9/2024 12:44 PM    Performed by: Fouzia Black CRNA  Authorized by: Edwin Reyes MD    Intubation:     Induction:  Intravenous    Intubated:  Postinduction    Mask Ventilation:  Easy with oral airway    Attempts:  1    Attempted By:  CRNA    Method of Intubation:  Video laryngoscopy    Blade:  Mckeon 3    Laryngeal View Grade: Grade I - full view of cords      Difficult Airway Encountered?: No      Complications:  None    Airway Device:  Oral endotracheal tube    Airway Device Size:  7.5    Style/Cuff Inflation:  Cuffed (inflated to minimal occlusive pressure)    Inflation Amount (mL):  5    Tube secured:  22    Secured at:  The lips    Placement Verified By:  Capnometry    Complicating Factors:  Obesity    Findings Post-Intubation:  BS equal bilateral and atraumatic/condition of teeth unchanged

## 2024-01-09 NOTE — ANESTHESIA POSTPROCEDURE EVALUATION
Anesthesia Post Evaluation    Patient: Marika Lindsey    Procedure(s) Performed: Procedure(s) (LRB):  abdominal wall wound debridement (N/A)  INCISION AND DRAINAGE, ABDOMEN    Final Anesthesia Type: general      Patient location during evaluation: PACU  Patient participation: Yes- Able to Participate  Level of consciousness: awake and alert  Post-procedure vital signs: reviewed and stable  Pain management: adequate  Airway patency: patent    PONV status at discharge: No PONV  Anesthetic complications: no      Cardiovascular status: blood pressure returned to baseline  Respiratory status: unassisted  Hydration status: euvolemic  Follow-up not needed.              Vitals Value Taken Time   /57 01/09/24 1400   Temp 36.5 °C (97.7 °F) 01/09/24 1328   Pulse 70 01/09/24 1400   Resp 18 01/09/24 1400   SpO2 98 % 01/09/24 1345         Event Time   Out of Recovery 14:12:00         Pain/Celio Score: Celio Score: 10 (1/9/2024  1:58 PM)

## 2024-01-09 NOTE — TELEPHONE ENCOUNTER
Spoke with pt regarding PO appt to remove staples. Offered pt to be seen on 1/22 at 2 PM in the [facility]. Pt verbally confirmed time and location. Pt would like to know when Dr. Garces will come see her for post surgical evaluation while in outpatient procedure. Informed that she or an attending will D/C her with directions following procedures. Pt denies further questions at this time.       ----- Message from Adelina Garces MD sent at 1/9/2024  1:26 PM CST -----  Can we make her a follow appt for 10-14ish days for suture removal?     Thanks!

## 2024-01-09 NOTE — DISCHARGE INSTRUCTIONS
DISCHARGE INSTRUCTIONS:    Take tylenol and ibuprofen for pain.  Take oxycodone for breakthrough pain.   Pack wound daily with wet to dry dressing.  Cover with dry dressing.  Remove packing and shower daily.   Call [phone] for worsening pain, fever > 101, purulent drainage from incision  Follow up in 2 weeks      Adelina Garces MD  Staff Surgeon   Colon & Rectal Surgery

## 2024-01-09 NOTE — OR NURSING
VSS on RA. Pt denies pain. Abdominal dressing, CDI. PIV CDI. Meets PACU discharge criteria. Ready for transfer to phase II. Family notified.

## 2024-01-09 NOTE — H&P
CRS  History and Physical      SUBJECTIVE:     Chief Complaint: abdominal wall abscess    History of Present Illness:  Marika Lindsey is a 53 y.o. female presents with recurrent abdominal wall abscesses following multiple abdominal operations.  No changes since last office visit.  Remains on antibiotics     Review of patient's allergies indicates:   Allergen Reactions    Adhesive Rash     Just abdomen   Please use paper tape       Past Medical History:   Diagnosis Date    Dehisced gastrointestinal anastomosis     Diverticulitis     GERD (gastroesophageal reflux disease)     Thyroid disease     Wound dehiscence      Past Surgical History:   Procedure Laterality Date    COLON SURGERY  2022    sigmoid colon removed    COLONOSCOPY      COLONOSCOPY      COLOSTOMY      ROBOTIC CLOSURE, COLOSTOMY N/A 2022    Procedure: ROBOTIC CLOSURE, COLOSTOMY ;  Surgeon: Adelina Garces MD;  Location: [facility];  Service: Colon and Rectal;  Laterality: N/A;  W/ FLEXIBLE SIGMOIDOSCOPY   LITHOTOMY EEA STAPLER       URETHRA SURGERY       History reviewed. No pertinent family history.  Social History     Tobacco Use    Smoking status: Former     Current packs/day: 0.00     Types: Cigarettes     Quit date: 10/19/2006     Years since quittin.2    Smokeless tobacco: Never   Substance Use Topics    Alcohol use: Not Currently     Comment: rare    Drug use: Not Currently        Review of Systems:  Review of Systems   All other systems reviewed and are negative.      OBJECTIVE:     Vital Signs (Most Recent)  /64 (BP Location: Right arm, Patient Position: Lying)   Pulse 82   Temp 97.8 °F (36.6 °C) (Oral)   Resp 18   SpO2 95%   Breastfeeding No     Physical Exam:  General: 53 y.o. female in no distress   Neuro: alert and oriented x 4.  Moves all extremities.     HEENT: normocephalic, atraumatic, PERRL, EOMI   Respiratory: respirations are even and unlabored  Cardiac: regular rate and rhythm  Abdomen: soft,  NTND  Extremities: Warm dry and intact  Skin: no rashes    Labs: NA    Imaging: NA      ASSESSMENT/PLAN:       53 y.o. female with recurrent abdominal wall abscess    - to OR for abdominal wall debridement  - consent signed  - proceed     Adelina Garces MD  Staff Surgeon  Colon & Rectal Surgery

## 2024-01-11 LAB
FINAL PATHOLOGIC DIAGNOSIS: NORMAL
GROSS: NORMAL
Lab: NORMAL
MICROSCOPIC EXAM: NORMAL

## 2024-01-18 ENCOUNTER — PATIENT MESSAGE (OUTPATIENT)
Dept: SURGERY | Facility: CLINIC | Age: 54
End: 2024-01-18
Payer: COMMERCIAL

## 2024-01-22 ENCOUNTER — OFFICE VISIT (OUTPATIENT)
Dept: SURGERY | Facility: CLINIC | Age: 54
End: 2024-01-22
Payer: COMMERCIAL

## 2024-01-22 ENCOUNTER — TELEPHONE (OUTPATIENT)
Dept: SURGERY | Facility: CLINIC | Age: 54
End: 2024-01-22
Payer: COMMERCIAL

## 2024-01-22 VITALS
HEART RATE: 88 BPM | RESPIRATION RATE: 19 BRPM | BODY MASS INDEX: 47.48 KG/M2 | HEIGHT: 64 IN | TEMPERATURE: 98 F | OXYGEN SATURATION: 97 % | WEIGHT: 278.13 LBS | DIASTOLIC BLOOD PRESSURE: 89 MMHG | SYSTOLIC BLOOD PRESSURE: 144 MMHG

## 2024-01-22 DIAGNOSIS — L08.9 CHRONIC WOUND INFECTION OF ABDOMEN, SEQUELA: Primary | ICD-10-CM

## 2024-01-22 DIAGNOSIS — S31.109S CHRONIC WOUND INFECTION OF ABDOMEN, SEQUELA: Primary | ICD-10-CM

## 2024-01-22 PROBLEM — A41.9 SEPSIS: Status: RESOLVED | Noted: 2022-01-21 | Resolved: 2024-01-22

## 2024-01-22 PROCEDURE — 99999 PR PBB SHADOW E&M-EST. PATIENT-LVL III: CPT | Mod: PBBFAC,,, | Performed by: SURGERY

## 2024-01-22 PROCEDURE — 99024 POSTOP FOLLOW-UP VISIT: CPT | Mod: S$GLB,,, | Performed by: SURGERY

## 2024-01-22 PROCEDURE — 3077F SYST BP >= 140 MM HG: CPT | Mod: CPTII,S$GLB,, | Performed by: SURGERY

## 2024-01-22 PROCEDURE — 3079F DIAST BP 80-89 MM HG: CPT | Mod: CPTII,S$GLB,, | Performed by: SURGERY

## 2024-01-22 PROCEDURE — 1159F MED LIST DOCD IN RCRD: CPT | Mod: CPTII,S$GLB,, | Performed by: SURGERY

## 2024-01-24 NOTE — PROGRESS NOTES
Colon & Rectal Surgery Clinic Follow Up    HPI:   Marika Lindsey is a 53 y.o. female who presents for follow up after abdominal wall wound exploration.      She is doing well with packing.  Minimal pain.  Ongoing fibrinous drainage.       Objective:   Vitals:    01/22/24 1443   BP: (!) 144/89   Pulse: 88   Resp: 19   Temp: 97.7 °F (36.5 °C)        Physical Exam   Gen: well developed female, NAD  HEENT: normocephalic, atraumatic, PERRL, EOMI   CV: RRR, no murmurs  Resp: nonlabored, CTAB   Abd: soft, right abdominal wall wound with healthy pink granulation tissue, no surrounding erythema, sutures partially disrupted   MSK: no gross deformities, no cyanosis or edema     Assessment and Plan:   Marika Lindsey  is a 53 y.o. female who presents for follow up of recurrent abdominal wall abscess s/p wound exploration     - wound healing well, remaining sutures removed  - continue local wound care  - follow up in 1 month for wound check       Adelina Garces MD  Staff Surgeon   Colon & Rectal Surgery

## 2024-01-30 ENCOUNTER — LAB VISIT (OUTPATIENT)
Dept: LAB | Facility: HOSPITAL | Age: 54
End: 2024-01-30
Payer: COMMERCIAL

## 2024-01-30 ENCOUNTER — OFFICE VISIT (OUTPATIENT)
Dept: INTERNAL MEDICINE | Facility: CLINIC | Age: 54
End: 2024-01-30
Payer: COMMERCIAL

## 2024-01-30 ENCOUNTER — PATIENT MESSAGE (OUTPATIENT)
Dept: SURGERY | Facility: CLINIC | Age: 54
End: 2024-01-30
Payer: COMMERCIAL

## 2024-01-30 VITALS
DIASTOLIC BLOOD PRESSURE: 78 MMHG | SYSTOLIC BLOOD PRESSURE: 112 MMHG | HEART RATE: 90 BPM | WEIGHT: 274.69 LBS | HEIGHT: 64 IN | BODY MASS INDEX: 46.9 KG/M2 | OXYGEN SATURATION: 95 %

## 2024-01-30 DIAGNOSIS — N30.00 ACUTE CYSTITIS WITHOUT HEMATURIA: Primary | ICD-10-CM

## 2024-01-30 DIAGNOSIS — R19.7 BLOODY DIARRHEA: ICD-10-CM

## 2024-01-30 LAB
BACTERIA #/AREA URNS AUTO: NORMAL /HPF
BASOPHILS # BLD AUTO: 0.04 K/UL (ref 0–0.2)
BASOPHILS NFR BLD: 0.6 % (ref 0–1.9)
BILIRUB UR QL STRIP: NEGATIVE
CLARITY UR REFRACT.AUTO: ABNORMAL
COLOR UR AUTO: YELLOW
DIFFERENTIAL METHOD BLD: NORMAL
EOSINOPHIL # BLD AUTO: 0.4 K/UL (ref 0–0.5)
EOSINOPHIL NFR BLD: 5.2 % (ref 0–8)
ERYTHROCYTE [DISTWIDTH] IN BLOOD BY AUTOMATED COUNT: 13.5 % (ref 11.5–14.5)
GLUCOSE UR QL STRIP: NEGATIVE
HCT VFR BLD AUTO: 40.8 % (ref 37–48.5)
HGB BLD-MCNC: 13.4 G/DL (ref 12–16)
HGB UR QL STRIP: NEGATIVE
HYALINE CASTS UR QL AUTO: 0 /LPF
IMM GRANULOCYTES # BLD AUTO: 0.01 K/UL (ref 0–0.04)
IMM GRANULOCYTES NFR BLD AUTO: 0.1 % (ref 0–0.5)
KETONES UR QL STRIP: NEGATIVE
LEUKOCYTE ESTERASE UR QL STRIP: ABNORMAL
LYMPHOCYTES # BLD AUTO: 2.6 K/UL (ref 1–4.8)
LYMPHOCYTES NFR BLD: 36.9 % (ref 18–48)
MCH RBC QN AUTO: 30.6 PG (ref 27–31)
MCHC RBC AUTO-ENTMCNC: 32.8 G/DL (ref 32–36)
MCV RBC AUTO: 93 FL (ref 82–98)
MICROSCOPIC COMMENT: NORMAL
MONOCYTES # BLD AUTO: 1 K/UL (ref 0.3–1)
MONOCYTES NFR BLD: 15 % (ref 4–15)
NEUTROPHILS # BLD AUTO: 2.9 K/UL (ref 1.8–7.7)
NEUTROPHILS NFR BLD: 42.2 % (ref 38–73)
NITRITE UR QL STRIP: NEGATIVE
NRBC BLD-RTO: 0 /100 WBC
PH UR STRIP: >8 [PH] (ref 5–8)
PLATELET # BLD AUTO: 296 K/UL (ref 150–450)
PMV BLD AUTO: 10 FL (ref 9.2–12.9)
PROT UR QL STRIP: ABNORMAL
RBC # BLD AUTO: 4.38 M/UL (ref 4–5.4)
RBC #/AREA URNS AUTO: 0 /HPF (ref 0–4)
SP GR UR STRIP: 1.02 (ref 1–1.03)
SQUAMOUS #/AREA URNS AUTO: 1 /HPF
TRI-PHOS CRY UR QL COMP ASSIST: NORMAL
URN SPEC COLLECT METH UR: ABNORMAL
WBC # BLD AUTO: 6.94 K/UL (ref 3.9–12.7)
WBC #/AREA URNS AUTO: 2 /HPF (ref 0–5)

## 2024-01-30 PROCEDURE — 99999 PR PBB SHADOW E&M-EST. PATIENT-LVL III: CPT | Mod: PBBFAC,,,

## 2024-01-30 PROCEDURE — 3074F SYST BP LT 130 MM HG: CPT | Mod: CPTII,S$GLB,,

## 2024-01-30 PROCEDURE — 85025 COMPLETE CBC W/AUTO DIFF WBC: CPT

## 2024-01-30 PROCEDURE — 99213 OFFICE O/P EST LOW 20 MIN: CPT | Mod: S$GLB,,,

## 2024-01-30 PROCEDURE — 3078F DIAST BP <80 MM HG: CPT | Mod: CPTII,S$GLB,,

## 2024-01-30 PROCEDURE — 3008F BODY MASS INDEX DOCD: CPT | Mod: CPTII,S$GLB,,

## 2024-01-30 PROCEDURE — 81001 URINALYSIS AUTO W/SCOPE: CPT

## 2024-01-30 PROCEDURE — 36415 COLL VENOUS BLD VENIPUNCTURE: CPT

## 2024-01-30 NOTE — PROGRESS NOTES
Subjective     Chief Complaint: UTI    History of Present Illness:  Ms. Marika Lindsey is a 53 y.o. female  with significant PMHx of multiple GI surgeries, ostomy placements, and diverticulitis here for concerns of UTI.  Endorses endorses 3 weeks of bright red blood with diarrhea.  Denies clots.  Denies lightheadedness or for increased dizziness/fatigue.  Endorses good hydration.  Has now begun to endorse urinary urgency and frequency with alternating variations of cloudy versus non cloudy urine.  Recently had follow up with Colorectal surgery one-week prior no complaints at this time.  Patient states that she did not mention the diarrhea or bright red blood at appointment.  Denies abdominal pain.  Does not have primary care provider.     Review of Systems   Constitutional:  Positive for malaise/fatigue. Negative for chills and fever.   Respiratory:  Negative for cough, shortness of breath and wheezing.    Gastrointestinal:  Positive for blood in stool and diarrhea. Negative for abdominal pain, constipation, heartburn, melena, nausea and vomiting.   Genitourinary: Negative.    Musculoskeletal: Negative.    Neurological: Negative.    Endo/Heme/Allergies: Negative.    Psychiatric/Behavioral: Negative.       PAST HISTORY:     Past Medical History:   Diagnosis Date    Dehisced gastrointestinal anastomosis     Diverticulitis     GERD (gastroesophageal reflux disease)     Thyroid disease     Wound dehiscence        Past Surgical History:   Procedure Laterality Date    COLON SURGERY  01/2022    sigmoid colon removed    COLONOSCOPY  2021    COLONOSCOPY  2022    COLOSTOMY      DEBRIDEMENT, ABDOMEN N/A 1/9/2024    Procedure: DEBRIDEMENT, ABDOMEN;  Surgeon: Adelina Garces MD;  Location: [facility] OR;  Service: Colon and Rectal;  Laterality: N/A;  ABDOMENAL WALL  WOUND    INCISION AND DRAINAGE OF ABDOMEN  1/9/2024    Procedure: INCISION AND DRAINAGE, ABDOMEN;  Surgeon: Adelina Garces MD;  Location: [facility] OR;  Service:  "Colon and Rectal;;  incision and drainage of abdominal wall abcess    ROBOTIC CLOSURE, COLOSTOMY N/A 2022    Procedure: ROBOTIC CLOSURE, COLOSTOMY ;  Surgeon: Adelina Garces MD;  Location: [facility] OR;  Service: Colon and Rectal;  Laterality: N/A;  W/ FLEXIBLE SIGMOIDOSCOPY   LITHOTOMY EEA STAPLER       URETHRA SURGERY         No family history on file.    Social History     Tobacco Use    Smoking status: Former     Current packs/day: 0.00     Types: Cigarettes     Quit date: 10/19/2006     Years since quittin.2    Smokeless tobacco: Never   Substance Use Topics    Alcohol use: Not Currently     Comment: rare    Drug use: Not Currently       MEDICATIONS & ALLERGIES:     Current Outpatient Medications on File Prior to Visit   Medication Sig    levothyroxine (SYNTHROID) 50 MCG tablet Take 1 tablet (50 mcg total) by mouth before breakfast.    oxyCODONE (ROXICODONE) 5 MG immediate release tablet Take 1 tablet (5 mg total) by mouth every 4 (four) hours as needed for Pain.    sulfamethoxazole-trimethoprim 800-160mg (BACTRIM DS) 800-160 mg Tab Take 1 tablet by mouth 2 (two) times daily.     Current Facility-Administered Medications on File Prior to Visit   Medication    LIDOcaine (PF) 10 mg/ml (1%) injection 10 mg    mupirocin 2 % ointment    scopolamine 1.3-1.5 mg (1 mg over 3 days) 1 patch       Review of patient's allergies indicates:   Allergen Reactions    Adhesive Rash     Just abdomen   Please use paper tape       OBJECTIVE:     Vital Signs:  Vitals:    24 1407   BP: 112/78   Pulse: 90   SpO2: 95%   Weight: 124.6 kg (274 lb 11.1 oz)   Height: 5' 4" (1.626 m)       Body mass index is 47.15 kg/m².     Physical Exam    Health Maintenance Due   Topic Date Due    Pneumococcal Vaccines (Age 0-64) (1 of 2 - PCV) Never done    TETANUS VACCINE  Never done    Colorectal Cancer Screening  Never done    Shingles Vaccine (1 of 2) Never done    Influenza Vaccine (1) 2023    COVID-19 Vaccine ( season) " 09/01/2023    Mammogram  04/11/2024    Cervical Cancer Screening  04/26/2024         ASSESSMENT & PLAN:   Ms. Marika Lindsey is a 53 y.o. female here with 3 weeks of loose stools and BRBPR along with concerns for UTI symptoms.    Plan:  - Will test for UA and stool studies   - Will await results prior to starting abx due to c diff as possible concern and worsening of symptoms, though not highly suspicious at this time but previous diagnosis of c. diff in the past     - Will await results prior to starting management, patient in agreement   - Will order Cbc regarding concerns for ongoing bleeding though denies light headiness or dizziness   - F/U with surgery appointment 2/5   - F/U next clinic block to establish  care     Acute cystitis without hematuria  -     Urinalysis, Reflex to Urine Culture Urine, Clean Catch    Bloody diarrhea  -     CULTURE, STOOL; Future; Expected date: 01/30/2024  -     WBC, Stool; Future; Expected date: 01/30/2024  -     Occult blood x 1, stool; Future; Expected date: 01/30/2024  -     CLOSTRIDIUM DIFFICILE; Future; Expected date: 01/30/2024  -     CBC W/ AUTO DIFFERENTIAL; Future; Expected date: 01/30/2024            Discussed with Dr. Giron - staff attestation to follow      Vijay Olivas DO, MSB   Internal Medicine, PGY-2  [facility]  [address] [place] [id number]  [phone]    I have discussed A/P with Dr Olivas  and agree with plan of action.  Goldie Hathaway.

## 2024-01-31 ENCOUNTER — LAB VISIT (OUTPATIENT)
Dept: LAB | Facility: HOSPITAL | Age: 54
End: 2024-01-31
Payer: COMMERCIAL

## 2024-01-31 ENCOUNTER — TELEPHONE (OUTPATIENT)
Dept: SURGERY | Facility: CLINIC | Age: 54
End: 2024-01-31
Payer: COMMERCIAL

## 2024-01-31 DIAGNOSIS — R19.7 BLOODY DIARRHEA: ICD-10-CM

## 2024-01-31 LAB
C DIFF GDH STL QL: NEGATIVE
C DIFF TOX A+B STL QL IA: NEGATIVE
OB PNL STL: POSITIVE
WBC #/AREA STL HPF: ABNORMAL /[HPF]

## 2024-01-31 PROCEDURE — 87046 STOOL CULTR AEROBIC BACT EA: CPT

## 2024-01-31 PROCEDURE — 87449 NOS EACH ORGANISM AG IA: CPT | Mod: 91

## 2024-01-31 PROCEDURE — 87427 SHIGA-LIKE TOXIN AG IA: CPT | Mod: 59

## 2024-01-31 PROCEDURE — 82272 OCCULT BLD FECES 1-3 TESTS: CPT

## 2024-01-31 PROCEDURE — 87449 NOS EACH ORGANISM AG IA: CPT

## 2024-01-31 PROCEDURE — 87045 FECES CULTURE AEROBIC BACT: CPT

## 2024-01-31 PROCEDURE — 89055 LEUKOCYTE ASSESSMENT FECAL: CPT

## 2024-01-31 RX ORDER — METRONIDAZOLE 500 MG/1
500 TABLET ORAL EVERY 8 HOURS
Qty: 30 TABLET | Refills: 0 | Status: SHIPPED | OUTPATIENT
Start: 2024-01-31

## 2024-01-31 RX ORDER — CIPROFLOXACIN 500 MG/1
500 TABLET ORAL EVERY 12 HOURS
Qty: 20 TABLET | Refills: 0 | Status: SHIPPED | OUTPATIENT
Start: 2024-01-31

## 2024-01-31 NOTE — TELEPHONE ENCOUNTER
Spoke with Ms. Lindsey regarding bloody diarrhea.  Has ongoing proctitis. Bringing in stool sample today. Will start empiric antibiotics.  Has follow up in clinic on 2/5.      Adelina Garces MD  Staff Surgeon   Colon & Rectal Surgery

## 2024-02-02 ENCOUNTER — TELEPHONE (OUTPATIENT)
Dept: SURGERY | Facility: CLINIC | Age: 54
End: 2024-02-02
Payer: COMMERCIAL

## 2024-02-02 LAB
E COLI SXT1 STL QL IA: NEGATIVE
E COLI SXT2 STL QL IA: NEGATIVE

## 2024-02-03 LAB — BACTERIA STL CULT: NORMAL

## 2024-02-05 ENCOUNTER — OFFICE VISIT (OUTPATIENT)
Dept: SURGERY | Facility: CLINIC | Age: 54
End: 2024-02-05
Payer: COMMERCIAL

## 2024-02-05 VITALS
BODY MASS INDEX: 47.13 KG/M2 | OXYGEN SATURATION: 99 % | HEIGHT: 64 IN | HEART RATE: 74 BPM | DIASTOLIC BLOOD PRESSURE: 84 MMHG | RESPIRATION RATE: 19 BRPM | SYSTOLIC BLOOD PRESSURE: 150 MMHG

## 2024-02-05 DIAGNOSIS — L08.9 CHRONIC WOUND INFECTION OF ABDOMEN, SEQUELA: Primary | ICD-10-CM

## 2024-02-05 DIAGNOSIS — S31.109S CHRONIC WOUND INFECTION OF ABDOMEN, SEQUELA: Primary | ICD-10-CM

## 2024-02-05 PROCEDURE — 3077F SYST BP >= 140 MM HG: CPT | Mod: CPTII,S$GLB,, | Performed by: SURGERY

## 2024-02-05 PROCEDURE — 99024 POSTOP FOLLOW-UP VISIT: CPT | Mod: S$GLB,,, | Performed by: SURGERY

## 2024-02-05 PROCEDURE — 99999 PR PBB SHADOW E&M-EST. PATIENT-LVL III: CPT | Mod: PBBFAC,,, | Performed by: SURGERY

## 2024-02-05 PROCEDURE — 1159F MED LIST DOCD IN RCRD: CPT | Mod: CPTII,S$GLB,, | Performed by: SURGERY

## 2024-02-05 PROCEDURE — 3079F DIAST BP 80-89 MM HG: CPT | Mod: CPTII,S$GLB,, | Performed by: SURGERY

## 2024-02-05 NOTE — PROGRESS NOTES
Colon & Rectal Surgery Clinic Follow Up    HPI:   Marika Lindsey is a 53 y.o. female who presents for follow up after perforated diverticulitis     6/2022 robotic colostomy closure     1/2024 abdominal wall exploration     Interval history:   Diarrhea present but improved.  Still taking antibiotics.  Doing well with wound care.          Objective:   Vitals:    02/05/24 1311   BP: (!) 150/84   Pulse: 74   Resp: 19        Physical Exam   Gen: well developed female, NAD  HEENT: normocephalic, atraumatic, PERRL, EOMI   CV: RRR, no murmurs  Resp: nonlabored, CTAB   Abd: soft, NTND, incision   MSK: no gross deformities, no cyanosis or edema       Assessment and Plan:   Marika Lindsey  is a 53 y.o. female who presents for follow up after abdominal wall exploration     - complete course of antibiotics for proctitis.  Will plan for flex sig at next visit   - continue local wound care  - follow up in 1 month       Adelina Garces MD  Staff Surgeon   Colon & Rectal Surgery

## 2024-02-27 ENCOUNTER — PATIENT MESSAGE (OUTPATIENT)
Dept: SURGERY | Facility: CLINIC | Age: 54
End: 2024-02-27
Payer: COMMERCIAL

## 2024-03-04 ENCOUNTER — OFFICE VISIT (OUTPATIENT)
Dept: SURGERY | Facility: CLINIC | Age: 54
End: 2024-03-04
Payer: COMMERCIAL

## 2024-03-04 VITALS
BODY MASS INDEX: 46.14 KG/M2 | SYSTOLIC BLOOD PRESSURE: 145 MMHG | OXYGEN SATURATION: 95 % | DIASTOLIC BLOOD PRESSURE: 72 MMHG | HEART RATE: 74 BPM | WEIGHT: 268.94 LBS

## 2024-03-04 DIAGNOSIS — S31.109S CHRONIC WOUND INFECTION OF ABDOMEN, SEQUELA: Primary | ICD-10-CM

## 2024-03-04 DIAGNOSIS — L08.9 CHRONIC WOUND INFECTION OF ABDOMEN, SEQUELA: Primary | ICD-10-CM

## 2024-03-04 PROCEDURE — 99024 POSTOP FOLLOW-UP VISIT: CPT | Mod: S$GLB,,, | Performed by: SURGERY

## 2024-03-04 PROCEDURE — 1159F MED LIST DOCD IN RCRD: CPT | Mod: CPTII,S$GLB,, | Performed by: SURGERY

## 2024-03-04 PROCEDURE — 3078F DIAST BP <80 MM HG: CPT | Mod: CPTII,S$GLB,, | Performed by: SURGERY

## 2024-03-04 PROCEDURE — 45330 DIAGNOSTIC SIGMOIDOSCOPY: CPT | Mod: 52,,, | Performed by: SURGERY

## 2024-03-04 PROCEDURE — 99999 PR PBB SHADOW E&M-EST. PATIENT-LVL III: CPT | Mod: PBBFAC,,, | Performed by: SURGERY

## 2024-03-04 PROCEDURE — 3077F SYST BP >= 140 MM HG: CPT | Mod: CPTII,S$GLB,, | Performed by: SURGERY

## 2024-03-04 NOTE — PROVATION PATIENT INSTRUCTIONS
Discharge Summary/Instructions after an Endoscopic Procedure  Patient Name: Marika Lindsey  Patient MRN: [id number]  Patient YOB: 1970 Monday, March 4, 2024  Adelina Garces MD  Dear patient,  As a result of recent federal legislation (The Federal Cures Act), you may   receive lab or pathology results from your procedure in your [facility]   account before your physician is able to contact you. Your physician or   their representative will relay the results to you with their   recommendations at their soonest availability.  Thank you,  RESTRICTIONS:  During your procedure today, you received medications for sedation.  These   medications may affect your judgment, balance and coordination.  Therefore,   for 24 hours, you have the following restrictions:   - DO NOT drive a car, operate machinery, make legal/financial decisions,   sign important papers or drink alcohol.    ACTIVITY:  Today: no heavy lifting, straining or running due to procedural   sedation/anesthesia.  The following day: return to full activity including work.  DIET:  Eat and drink normally unless instructed otherwise.     TREATMENT FOR COMMON SIDE EFFECTS:  - Mild abdominal pain, nausea, belching, bloating or excessive gas:  rest,   eat lightly and use a heating pad.  - Sore Throat: treat with throat lozenges and/or gargle with warm salt   water.  - Because air was used during the procedure, expelling large amounts of air   from your rectum or belching is normal.  - If a bowel prep was taken, you may not have a bowel movement for 1-3 days.    This is normal.  SYMPTOMS TO WATCH FOR AND REPORT TO YOUR PHYSICIAN:  1. Abdominal pain or bloating, other than gas cramps.  2. Chest pain.  3. Back pain.  4. Signs of infection such as: chills or fever occurring within 24 hours   after the procedure.  5. Rectal bleeding, which would show as bright red, maroon, or black stools.   (A tablespoon of blood from the rectum is not serious, especially if    hemorrhoids are present.)  6. Vomiting.  7. Weakness or dizziness.  GO DIRECTLY TO THE NEAREST EMERGENCY ROOM IF YOU HAVE ANY OF THE FOLLOWING:      Difficulty breathing              Chills and/or fever over 101 F   Persistent vomiting and/or vomiting blood   Severe abdominal pain   Severe chest pain   Black, tarry stools   Bleeding- more than one tablespoon   Any other symptom or condition that you feel may need urgent attention  Your doctor recommends these additional instructions:  If any biopsies were taken, your doctors clinic will contact you in 1 to 2   weeks with any results.  - Discharge patient to home (ambulatory).  For questions, problems or results please call your physician - Adelina Garces MD at Work:  [phone].  [facility], EMERGENCY ROOM PHONE NUMBER: [phone]  IF A COMPLICATION OR EMERGENCY SITUATION ARISES AND YOU ARE UNABLE TO REACH   YOUR PHYSICIAN - GO DIRECTLY TO THE EMERGENCY ROOM.  MD Adelina Grimes MD  3/4/2024 4:26:59 PM  This report has been verified and signed electronically.  Dear patient,  As a result of recent federal legislation (The Federal Cures Act), you may   receive lab or pathology results from your procedure in your [facility]   account before your physician is able to contact you. Your physician or   their representative will relay the results to you with their   recommendations at their soonest availability.  Thank you,  PROVATION

## 2024-03-05 ENCOUNTER — HOSPITAL ENCOUNTER (OUTPATIENT)
Dept: RADIOLOGY | Facility: HOSPITAL | Age: 54
Discharge: HOME | End: 2024-03-05
Attending: SURGERY
Payer: COMMERCIAL

## 2024-03-05 DIAGNOSIS — S31.109S CHRONIC WOUND INFECTION OF ABDOMEN, SEQUELA: ICD-10-CM

## 2024-03-05 DIAGNOSIS — L08.9 CHRONIC WOUND INFECTION OF ABDOMEN, SEQUELA: ICD-10-CM

## 2024-03-05 PROCEDURE — 74177 CT ABD & PELVIS W/CONTRAST: CPT | Mod: TC

## 2024-03-05 PROCEDURE — 25500020 PHARM REV CODE 255: Performed by: SURGERY

## 2024-03-05 PROCEDURE — 74177 CT ABD & PELVIS W/CONTRAST: CPT | Mod: 26,,, | Performed by: RADIOLOGY

## 2024-03-05 PROCEDURE — A9698 NON-RAD CONTRAST MATERIALNOC: HCPCS | Performed by: SURGERY

## 2024-03-05 RX ADMIN — IOHEXOL 1000 ML: 12 SOLUTION ORAL at 12:03

## 2024-03-05 RX ADMIN — IOHEXOL 100 ML: 350 INJECTION, SOLUTION INTRAVENOUS at 02:03

## 2024-03-05 NOTE — PROGRESS NOTES
Colon & Rectal Surgery Clinic Follow Up    HPI:   Marika Lindsey is a 53 y.o. female who presents for follow up after perforated diverticulitis      6/2022 robotic colostomy closure      1/2024 abdominal wall exploration      Interval history:   Wound healed.  Denies drainage.  Diarrhea resolved.  Patient reports feeling of inflammation that has occurred prior to previous abscesses.      Objective:   Vitals:    03/04/24 1517   BP: (!) 145/72   Pulse: 74        Physical Exam   Gen: well developed female, NAD  HEENT: normocephalic, atraumatic, PERRL, EOMI   CV: RRR, no murmurs  Resp: nonlabored, CTAB  Abd: soft, NTND, right lateral incision well healed without erythema or fluctuance   MSK: no gross deformities    Flex sig: see provation     Assessment and Plan:   Marika Lindsey  is a 53 y.o. female who presents for follow up of perforated diverticulitis     - Will check CRP today.  CT abd/pel tomorrow to assess for abscess  - flex sig in clinic without ongoing proctitis, patent anastomosis  - follow up TBD after imaging.  If no abscess, recommend follow up with PCP for ongoing evaluation       Adelina Garces MD  Staff Surgeon   Colon & Rectal Surgery

## 2024-03-06 ENCOUNTER — TELEPHONE (OUTPATIENT)
Dept: SURGERY | Facility: OTHER | Age: 54
End: 2024-03-06
Payer: COMMERCIAL

## 2024-03-06 NOTE — TELEPHONE ENCOUNTER
Spoke with patient regarding results of CT, there is a very small residual fluid collection/inflammation superficially that correlates to her abdominal incision.  Her CRP is normal.  No erythema or drainage at the site.  Do not suspect abscess.  She will follow up with her PCP next week and will follow up with me in 1 month.     Adelina Garces MD  Staff Surgeon   Colon & Rectal Surgery

## 2024-03-12 ENCOUNTER — LAB VISIT (OUTPATIENT)
Dept: LAB | Facility: HOSPITAL | Age: 54
End: 2024-03-12
Payer: COMMERCIAL

## 2024-03-12 ENCOUNTER — OFFICE VISIT (OUTPATIENT)
Dept: INTERNAL MEDICINE | Facility: CLINIC | Age: 54
End: 2024-03-12
Payer: COMMERCIAL

## 2024-03-12 VITALS
BODY MASS INDEX: 47.04 KG/M2 | HEIGHT: 64 IN | DIASTOLIC BLOOD PRESSURE: 84 MMHG | HEART RATE: 60 BPM | SYSTOLIC BLOOD PRESSURE: 118 MMHG | WEIGHT: 275.56 LBS

## 2024-03-12 DIAGNOSIS — E03.9 ACQUIRED HYPOTHYROIDISM: Primary | ICD-10-CM

## 2024-03-12 DIAGNOSIS — E03.9 ACQUIRED HYPOTHYROIDISM: ICD-10-CM

## 2024-03-12 PROCEDURE — 84443 ASSAY THYROID STIM HORMONE: CPT

## 2024-03-12 PROCEDURE — 99999 PR PBB SHADOW E&M-EST. PATIENT-LVL II: CPT | Mod: PBBFAC,,,

## 2024-03-12 PROCEDURE — 3074F SYST BP LT 130 MM HG: CPT | Mod: CPTII,S$GLB,,

## 2024-03-12 PROCEDURE — 99213 OFFICE O/P EST LOW 20 MIN: CPT | Mod: S$GLB,,,

## 2024-03-12 PROCEDURE — 3008F BODY MASS INDEX DOCD: CPT | Mod: CPTII,S$GLB,,

## 2024-03-12 PROCEDURE — 36415 COLL VENOUS BLD VENIPUNCTURE: CPT

## 2024-03-12 PROCEDURE — 84439 ASSAY OF FREE THYROXINE: CPT

## 2024-03-12 PROCEDURE — 3079F DIAST BP 80-89 MM HG: CPT | Mod: CPTII,S$GLB,,

## 2024-03-12 NOTE — PROGRESS NOTES
Subjective     Chief Complaint: Joint Pain     History of Present Illness:  Ms. Marika Lindsey is a 53 y.o. female recently establishing my clinic 1 month prior with multiple GI history.  Patient reports today stomach related problems or minimal when compared to previous presentation.  She currently is here today with concerns for ongoing transient joint pain of both upper extremities which radiate from fingers to wrist to elbow to shoulders.  She also endorses knee pain however she states having knee pain for many years and believes this is a separate issue.  She previously had this worked up by an endocrinologist as she has hypothyroidism, however she did not feel he was helpful.  She is tried Advil for pain with some improvement.  She is also tried other home remedies such as massaging of the arms and legs which provide some relief.  Recent CRP 2 for the 1st time after 5 previous values elevated.    Review of Systems   Constitutional:  Positive for malaise/fatigue. Negative for chills, diaphoresis, fever and weight loss.   Respiratory:  Negative for shortness of breath and wheezing.    Cardiovascular:  Negative for chest pain and palpitations.   Musculoskeletal:  Positive for joint pain and myalgias. Negative for falls.   Skin:  Negative for itching and rash.   Neurological: Negative.      PAST HISTORY:     Past Medical History:   Diagnosis Date    Dehisced gastrointestinal anastomosis     Diverticulitis     GERD (gastroesophageal reflux disease)     Thyroid disease     Wound dehiscence        Past Surgical History:   Procedure Laterality Date    COLON SURGERY  01/2022    sigmoid colon removed    COLONOSCOPY  2021    COLONOSCOPY  2022    COLOSTOMY      DEBRIDEMENT, ABDOMEN N/A 1/9/2024    Procedure: DEBRIDEMENT, ABDOMEN;  Surgeon: Adeilna Garces MD;  [facility];  Service: Colon and Rectal;  Laterality: N/A;  ABDOMENAL WALL  WOUND    INCISION AND DRAINAGE OF ABDOMEN  1/9/2024    Procedure:  "INCISION AND DRAINAGE, ABDOMEN;  Surgeon: Adelina Garces MD;  Location: [facility] OR;  Service: Colon and Rectal;;  incision and drainage of abdominal wall abcess    ROBOTIC CLOSURE, COLOSTOMY N/A 2022    Procedure: ROBOTIC CLOSURE, COLOSTOMY ;  Surgeon: Adelina Garces MD;  Location: [facility] OR;  Service: Colon and Rectal;  Laterality: N/A;  W/ FLEXIBLE SIGMOIDOSCOPY   LITHOTOMY EEA STAPLER       URETHRA SURGERY         No family history on file.    Social History     Tobacco Use    Smoking status: Former     Current packs/day: 0.00     Types: Cigarettes     Quit date: 10/19/2006     Years since quittin.4    Smokeless tobacco: Never   Substance Use Topics    Alcohol use: Not Currently     Comment: rare    Drug use: Not Currently       MEDICATIONS & ALLERGIES:     Current Outpatient Medications on File Prior to Visit   Medication Sig    ciprofloxacin HCl (CIPRO) 500 MG tablet Take 1 tablet (500 mg total) by mouth every 12 (twelve) hours.    levothyroxine (SYNTHROID) 50 MCG tablet Take 1 tablet (50 mcg total) by mouth before breakfast.    metroNIDAZOLE (FLAGYL) 500 MG tablet Take 1 tablet (500 mg total) by mouth every 8 (eight) hours.    oxyCODONE (ROXICODONE) 5 MG immediate release tablet Take 1 tablet (5 mg total) by mouth every 4 (four) hours as needed for Pain.    sulfamethoxazole-trimethoprim 800-160mg (BACTRIM DS) 800-160 mg Tab Take 1 tablet by mouth 2 (two) times daily.     Current Facility-Administered Medications on File Prior to Visit   Medication    LIDOcaine (PF) 10 mg/ml (1%) injection 10 mg    mupirocin 2 % ointment    scopolamine 1.3-1.5 mg (1 mg over 3 days) 1 patch       Review of patient's allergies indicates:   Allergen Reactions    Adhesive Rash     Just abdomen   Please use paper tape       OBJECTIVE:     Vital Signs:  Vitals:    24 1358   BP: 118/84   Pulse: 60   Weight: 125 kg (275 lb 9.2 oz)   Height: 5' 4" (1.626 m)       Body mass index is 47.3 kg/m².     Physical " Exam  Constitutional:       General: She is not in acute distress.     Appearance: She is obese. She is not toxic-appearing or diaphoretic.   HENT:      Head: Normocephalic and atraumatic.   Cardiovascular:      Rate and Rhythm: Normal rate and regular rhythm.      Pulses: Normal pulses.      Heart sounds: No murmur heard.  Pulmonary:      Effort: Pulmonary effort is normal. No respiratory distress.      Breath sounds: No wheezing.   Abdominal:      General: There is no distension.   Musculoskeletal:         General: Tenderness present. No swelling, deformity or signs of injury.   Neurological:      General: No focal deficit present.      Mental Status: She is oriented to person, place, and time. Mental status is at baseline.   Psychiatric:         Mood and Affect: Mood normal.         Behavior: Behavior normal.     Health Maintenance Due   Topic Date Due    Pneumococcal Vaccines (Age 0-64) (1 of 2 - PCV) Never done    TETANUS VACCINE  Never done    Shingles Vaccine (1 of 2) Never done    Influenza Vaccine (1) 09/01/2023    COVID-19 Vaccine (4 - 2023-24 season) 09/01/2023    Mammogram  04/11/2024    Cervical Cancer Screening  04/26/2024         ASSESSMENT & PLAN:   Ms. Marika Lindsey is a 53 y.o. female here for chronic transient joint pain.  Inflammation markers negative from last week.  We will retest TSH, and free T4.  Recommend Tylenol Arthritis for sustained pain control.  Reinforced continuation of home remedies which he found helpful.  We will follow up with labs over MyChart, patient can have follow up in 1 month if she believes this to be beneficial.  Also advised to create a diary regarding pain for the day.    Acquired hypothyroidism  -     Cancel: TSH; Future; Expected date: 03/12/2024  -     Cancel: T4, FREE; Future; Expected date: 03/12/2024  -     T4, FREE; Future; Expected date: 03/12/2024  -     TSH; Future; Expected date: 03/12/2024        RTC in 1 month if needed     Discussed with   Cesar  - staff attestation to follow      Vijay Olivas DO, MSB   Internal Medicine, PGY-2  [facility]  [address], [place] [id number] [phone]

## 2024-03-13 ENCOUNTER — PATIENT MESSAGE (OUTPATIENT)
Dept: INTERNAL MEDICINE | Facility: CLINIC | Age: 54
End: 2024-03-13
Payer: COMMERCIAL

## 2024-03-13 LAB
T4 FREE SERPL-MCNC: 0.91 NG/DL (ref 0.71–1.51)
TSH SERPL DL<=0.005 MIU/L-ACNC: 2.56 UIU/ML (ref 0.4–4)

## 2024-03-15 ENCOUNTER — TELEPHONE (OUTPATIENT)
Dept: SURGERY | Facility: CLINIC | Age: 54
End: 2024-03-15
Payer: COMMERCIAL

## 2024-03-15 NOTE — TELEPHONE ENCOUNTER
Spoke with pt regarding appt scheduled with Dr. Garces on 4/4. Informed that appt will need to be r/s d/t an add on surgical case. Offered pt available dates and times. Pt verbally agreed to 4/8 at 3:20 PM. Pt denies further questions at this time.

## 2024-04-03 ENCOUNTER — TELEPHONE (OUTPATIENT)
Dept: SURGERY | Facility: CLINIC | Age: 54
End: 2024-04-03
Payer: COMMERCIAL

## 2024-04-04 ENCOUNTER — OFFICE VISIT (OUTPATIENT)
Dept: SURGERY | Facility: CLINIC | Age: 54
End: 2024-04-04
Payer: COMMERCIAL

## 2024-04-04 VITALS
RESPIRATION RATE: 18 BRPM | WEIGHT: 277.75 LBS | SYSTOLIC BLOOD PRESSURE: 153 MMHG | BODY MASS INDEX: 47.42 KG/M2 | DIASTOLIC BLOOD PRESSURE: 84 MMHG | HEIGHT: 64 IN | HEART RATE: 70 BPM

## 2024-04-04 DIAGNOSIS — K43.2 VENTRAL INCISIONAL HERNIA: Primary | ICD-10-CM

## 2024-04-04 PROCEDURE — 1159F MED LIST DOCD IN RCRD: CPT | Mod: CPTII,S$GLB,, | Performed by: SURGERY

## 2024-04-04 PROCEDURE — 3079F DIAST BP 80-89 MM HG: CPT | Mod: CPTII,S$GLB,, | Performed by: SURGERY

## 2024-04-04 PROCEDURE — 99999 PR PBB SHADOW E&M-EST. PATIENT-LVL III: CPT | Mod: PBBFAC,,, | Performed by: SURGERY

## 2024-04-04 PROCEDURE — 99024 POSTOP FOLLOW-UP VISIT: CPT | Mod: S$GLB,,, | Performed by: SURGERY

## 2024-04-04 PROCEDURE — 3077F SYST BP >= 140 MM HG: CPT | Mod: CPTII,S$GLB,, | Performed by: SURGERY

## 2024-04-04 NOTE — PROGRESS NOTES
Colon & Rectal Surgery Clinic Follow Up    HPI:   Marika Lindsey is a 53 y.o. female who presents for follow up of perforated diverticulitis initially managed at [facility] 1/2022 with exploratory laparotomy and Renee's.       6/2022 robotic colostomy closure      1/2024 abdominal wall exploration with excision of suture material      3/4/2024 Wound healed.  Denies drainage.  Diarrhea resolved.  Patient reports feeling of inflammation that has occurred prior to previous abscesses.      Interval history:   Doing well.  No drainage from abdominal incision.  Most bothered by ventral incisional hernia.  Has tried binders but without success due to poor fit.        Objective:   Vitals:    04/04/24 1524   BP: (!) 153/84   Pulse: 70   Resp: 18        Physical Exam   Gen: well developed female, NAD  HEENT: normocephalic, atraumatic, PERRL, EOMI   CV: RRR, no murmurs  Resp: nonlabored, CTAB   Abd: soft, ventral incisional hernia, right abdominal wound well healed   MSK: no gross deformities, no cyanosis or edema       Assessment and Plan:   Marika Lindsey  is a 53 y.o. female who presents for follow up of diverticulitis and recurrent abdominal wall abscesses    - abscess resolved after excision of suture material   - patient overall healing well, she is most bothered by her ventral incisional hernia at this point.   - will refer to general surgery for consideration for hernia repair       Adelina Garces MD  Staff Surgeon   Colon & Rectal Surgery

## 2024-04-09 ENCOUNTER — OFFICE VISIT (OUTPATIENT)
Dept: SURGERY | Facility: CLINIC | Age: 54
End: 2024-04-09
Payer: COMMERCIAL

## 2024-04-09 VITALS
HEART RATE: 75 BPM | OXYGEN SATURATION: 96 % | DIASTOLIC BLOOD PRESSURE: 65 MMHG | HEIGHT: 64 IN | BODY MASS INDEX: 46.05 KG/M2 | SYSTOLIC BLOOD PRESSURE: 122 MMHG | WEIGHT: 269.75 LBS

## 2024-04-09 DIAGNOSIS — K43.2 VENTRAL INCISIONAL HERNIA: ICD-10-CM

## 2024-04-09 PROCEDURE — 1159F MED LIST DOCD IN RCRD: CPT | Mod: CPTII,S$GLB,, | Performed by: SURGERY

## 2024-04-09 PROCEDURE — 3078F DIAST BP <80 MM HG: CPT | Mod: CPTII,S$GLB,, | Performed by: SURGERY

## 2024-04-09 PROCEDURE — 3074F SYST BP LT 130 MM HG: CPT | Mod: CPTII,S$GLB,, | Performed by: SURGERY

## 2024-04-09 PROCEDURE — 99203 OFFICE O/P NEW LOW 30 MIN: CPT | Mod: S$GLB,,, | Performed by: SURGERY

## 2024-04-09 PROCEDURE — 3008F BODY MASS INDEX DOCD: CPT | Mod: CPTII,S$GLB,, | Performed by: SURGERY

## 2024-04-09 PROCEDURE — 99999 PR PBB SHADOW E&M-EST. PATIENT-LVL IV: CPT | Mod: PBBFAC,,, | Performed by: SURGERY

## 2024-04-09 NOTE — PROGRESS NOTES
General Surgery Office Visit   History and Physical    Patient Name: Marika Lindsey  YOB: 1970 (53 y.o.)  MRN: [id number]  Today's Date: 04/09/2024    Referring Md:   Self, Aaareferral  No address on file    SUBJECTIVE:     Chief Complaint: ventral hernia    History of Present Illness:  Marika Lindsey is a 53 y.o. female with PMHx of  hypothyroidism, GERD, and perforated diverticulitis s/p Renee's and subsequent colostomy closure in 2022  who presents to the clinic today with a ventral incisional hernia. She developed multiple abdominal wall abscesses in the post op period and last underwent an abdominal washout in January. She has now developed a large ventral hernia that is becoming more symptomatic. She states she is starting to have more frequent abdominal pain and recently noticed the lower right sided bulge grow larger. She denies nausea or vomiting. She has normal bowel function and is tolerating a regular diet without issues.    Patient denies personal history of MI, CVA, lung disease, DM  Previous abdominal surgeries include: ex lap with Renee's/ colostomy closure 2022, abdominal wall exploration 2024  Denies alcohol, tobacco, and elicit drug use. Previous smoker, quit 17 yrs ago  Not currently on any anticoagulants      Review of patient's allergies indicates:   Allergen Reactions    Adhesive Rash     Just abdomen   Please use paper tape       Past Medical History:   Diagnosis Date    Dehisced gastrointestinal anastomosis     Diverticulitis     GERD (gastroesophageal reflux disease)     Thyroid disease     Wound dehiscence      Past Surgical History:   Procedure Laterality Date    COLON SURGERY  01/2022    sigmoid colon removed    COLONOSCOPY  2021    COLONOSCOPY  2022    COLOSTOMY      DEBRIDEMENT, ABDOMEN N/A 1/9/2024    Procedure: DEBRIDEMENT, ABDOMEN;  Surgeon: Adelina Garces MD;  Location: [facility];  Service: Colon and Rectal;  Laterality: N/A;  ABDOMENAL WALL   WOUND    INCISION AND DRAINAGE OF ABDOMEN  2024    Procedure: INCISION AND DRAINAGE, ABDOMEN;  Surgeon: Adelina Garces MD;  Location: [facility] OR;  Service: Colon and Rectal;;  incision and drainage of abdominal wall abcess    ROBOTIC CLOSURE, COLOSTOMY N/A 2022    Procedure: ROBOTIC CLOSURE, COLOSTOMY ;  Surgeon: Adelina Garces MD;  Location: [facility] OR;  Service: Colon and Rectal;  Laterality: N/A;  W/ FLEXIBLE SIGMOIDOSCOPY   LITHOTOMY EEA STAPLER       URETHRA SURGERY       No family history on file.  Social History     Tobacco Use    Smoking status: Former     Current packs/day: 0.00     Types: Cigarettes     Quit date: 10/19/2006     Years since quittin.4    Smokeless tobacco: Never   Substance Use Topics    Alcohol use: Not Currently     Comment: rare    Drug use: Not Currently        Review of Systems:  Review of Systems   Constitutional:  Negative for chills and fever.   HENT: Negative.     Eyes: Negative.    Respiratory:  Negative for cough and shortness of breath.    Cardiovascular:  Negative for chest pain and palpitations.   Gastrointestinal:  Positive for abdominal pain. Negative for nausea and vomiting.   Genitourinary:  Negative for dysuria and hematuria.   Musculoskeletal:  Negative for myalgias and neck pain.   Skin:  Negative for itching and rash.   Neurological:  Negative for dizziness and weakness.   Endo/Heme/Allergies:  Negative for environmental allergies. Does not bruise/bleed easily.   Psychiatric/Behavioral: Negative.         OBJECTIVE:     Vital Signs (Most Recent)  There were no vitals taken for this visit.    Physical Exam  Constitutional:       General: She is not in acute distress.     Appearance: Normal appearance. She is not ill-appearing.   HENT:      Head: Normocephalic and atraumatic.      Nose: Nose normal.      Mouth/Throat:      Mouth: Mucous membranes are moist.   Eyes:      General: No scleral icterus.     Extraocular Movements: Extraocular movements intact.       Pupils: Pupils are equal, round, and reactive to light.   Neck:      Trachea: No tracheal deviation.   Cardiovascular:      Rate and Rhythm: Normal rate and regular rhythm.   Pulmonary:      Effort: Pulmonary effort is normal. No respiratory distress.      Breath sounds: No stridor.   Abdominal:      General: There is no distension.      Palpations: Abdomen is soft.      Tenderness: There is no abdominal tenderness.      Comments: Large infra-umbilical hernia right of midline. Reduces spontaneously. No overlying skin changes. Mildly TTP   Musculoskeletal:         General: No deformity or signs of injury. Normal range of motion.      Cervical back: Normal range of motion. No edema or erythema.   Skin:     General: Skin is warm and dry.      Coloration: Skin is not jaundiced.   Neurological:      General: No focal deficit present.      Mental Status: She is alert and oriented to person, place, and time.   Psychiatric:         Mood and Affect: Mood normal.         Behavior: Behavior normal.         Labs:     Lab Results   Component Value Date    WBC 6.94 01/30/2024    HGB 13.4 01/30/2024    HCT 40.8 01/30/2024    MCV 93 01/30/2024     01/30/2024         CMP  Sodium   Date Value Ref Range Status   01/08/2024 138 136 - 145 mmol/L Final     Potassium   Date Value Ref Range Status   01/08/2024 4.8 3.5 - 5.1 mmol/L Final     Chloride   Date Value Ref Range Status   01/08/2024 105 95 - 110 mmol/L Final     CO2   Date Value Ref Range Status   01/08/2024 23 23 - 29 mmol/L Final     Glucose   Date Value Ref Range Status   01/08/2024 102 70 - 110 mg/dL Final     BUN   Date Value Ref Range Status   01/08/2024 19 6 - 20 mg/dL Final     Creatinine   Date Value Ref Range Status   03/05/2024 0.9 0.5 - 1.4 mg/dL Final     Calcium   Date Value Ref Range Status   01/08/2024 9.3 8.7 - 10.5 mg/dL Final     Total Protein   Date Value Ref Range Status   10/18/2023 8.0 6.0 - 8.4 g/dL Final     Albumin   Date Value Ref Range Status    10/18/2023 4.0 3.5 - 5.2 g/dL Final     Total Bilirubin   Date Value Ref Range Status   10/18/2023 0.6 0.1 - 1.0 mg/dL Final     Comment:     For infants and newborns, interpretation of results should be based  on gestational age, weight and in agreement with clinical  observations.    Premature Infant recommended reference ranges:  Up to 24 hours.............<8.0 mg/dL  Up to 48 hours............<12.0 mg/dL  3-5 days..................<15.0 mg/dL  6-29 days.................<15.0 mg/dL       Alkaline Phosphatase   Date Value Ref Range Status   10/18/2023 77 55 - 135 U/L Final     AST   Date Value Ref Range Status   10/18/2023 18 10 - 40 U/L Final     ALT   Date Value Ref Range Status   10/18/2023 15 10 - 44 U/L Final     Anion Gap   Date Value Ref Range Status   01/08/2024 10 8 - 16 mmol/L Final     eGFR if    Date Value Ref Range Status   06/29/2022 >60 >60 mL/min/1.73 m^2 Final     eGFR if non    Date Value Ref Range Status   06/29/2022 52 (A) >60 mL/min/1.73 m^2 Final     Comment:     Calculation used to obtain the estimated glomerular filtration  rate (eGFR) is the CKD-EPI equation.              Imaging:     CT 3/5/24:  FINDINGS:  There are no pleural effusions.  There is no evidence of a pneumothorax.  No airspace opacity is present.  No pulmonary nodules identified.     The heart is unremarkable.  There is normal tapering of the abdominal aorta.  The minimal calcifications along the course of the abdominal aorta and its branch vessels.  The celiac trunk, the SMA, and GUSTAVO are within normal limits.  The portal veins and mesenteric vessels are within normal limits.  There is a retroaortic left renal vein.     There is no evidence of lymphadenopathy in the abdomen or pelvis.     The esophagus, stomach, and duodenum are within normal limits.  The small bowel loops are unremarkable.  The appendix is not visualized.  There are no secondary findings of acute appendicitis.  The large  bowel is unremarkable.     The liver is within normal limits.  The gallbladder is unremarkable.  The biliary tree is within normal limits.  The spleen is unremarkable.  The pancreas is within normal limits.  The adrenal glands are unremarkable.     The kidneys are unremarkable.  There is mild distention of the right ureter with no obstructing stone lesion identified along the course of the right ureter.  The urinary bladder is within normal limits.  The uterus and adnexal structures are within normal limits.     There is no evidence of free fluid in the abdomen or pelvis.  There is no evidence of free air.  There is no evidence of pneumatosis.  No portal venous air is identified.     The psoas margins are unremarkable.  There are postop changes in the anterior abdominal wall laxity of the anterior abdominal wall.  There has been near complete resolution of the previous right abdominal wall abscess residual 4.0  X 1.1 cm collection remaining within the right abdominal wall.  This previously measured approximately 4.6 x 2.7 cm.  No additional abdominal wall collection is present.     There are degenerative changes in the osseous structures.  There is no evidence of a fracture.     Impression:     Postoperative changes in the anterior abdominal wall with near complete resolution of previous right anterior abdominal wall abscess.  Minimal trace residual abscess remaining measuring approximately 4.0 x 1.1 cm.     Diverticulosis coli without evidence of acute diverticulitis.      ASSESSMENT/PLAN:     Diagnoses and all orders for this visit:    Ventral incisional hernia  -     Ambulatory referral/consult to General Surgery        Marika Lindsey is a 53 y.o. female with history of GERD, hypothyroidism, and diverticulitis who presents to clinic today with large ventral incisional hernia.      - discussed with patient her risk factors for developing this hernia as well as risks for undergoing hernia repair and  experiencing complications  - discussed importance of adequate weight loss to successful hernia repair  - will refer to bariatrics for weight loss management  - plan for follow up in 3 months to assess her progress  - Patient instructed to call clinic with any questions, concerns, or new symptoms  - Patient understands and in agreement with plan; all questions answered    Case discussed with Dr. Lynch.      Margie Michel MD  [facility]  General Surgery PGY-1

## 2024-04-27 ENCOUNTER — PATIENT MESSAGE (OUTPATIENT)
Dept: SURGERY | Facility: CLINIC | Age: 54
End: 2024-04-27
Payer: COMMERCIAL

## 2024-05-29 ENCOUNTER — PATIENT MESSAGE (OUTPATIENT)
Dept: ADMINISTRATIVE | Facility: OTHER | Age: 54
End: 2024-05-29
Payer: COMMERCIAL

## 2024-06-03 ENCOUNTER — PATIENT MESSAGE (OUTPATIENT)
Dept: SURGERY | Facility: CLINIC | Age: 54
End: 2024-06-03
Payer: COMMERCIAL

## 2024-07-01 ENCOUNTER — PATIENT MESSAGE (OUTPATIENT)
Dept: INTERNAL MEDICINE | Facility: CLINIC | Age: 54
End: 2024-07-01
Payer: COMMERCIAL

## 2024-07-09 ENCOUNTER — OFFICE VISIT (OUTPATIENT)
Dept: SURGERY | Facility: CLINIC | Age: 54
End: 2024-07-09
Payer: COMMERCIAL

## 2024-07-09 VITALS
HEART RATE: 73 BPM | DIASTOLIC BLOOD PRESSURE: 75 MMHG | BODY MASS INDEX: 44.79 KG/M2 | HEIGHT: 64 IN | SYSTOLIC BLOOD PRESSURE: 124 MMHG | OXYGEN SATURATION: 98 % | WEIGHT: 262.38 LBS

## 2024-07-09 DIAGNOSIS — K43.2 VENTRAL INCISIONAL HERNIA: Primary | ICD-10-CM

## 2024-07-09 PROCEDURE — 99212 OFFICE O/P EST SF 10 MIN: CPT | Mod: S$GLB,,, | Performed by: SURGERY

## 2024-07-09 PROCEDURE — 3078F DIAST BP <80 MM HG: CPT | Mod: CPTII,S$GLB,, | Performed by: SURGERY

## 2024-07-09 PROCEDURE — 99999 PR PBB SHADOW E&M-EST. PATIENT-LVL III: CPT | Mod: PBBFAC,,, | Performed by: SURGERY

## 2024-07-09 PROCEDURE — 1159F MED LIST DOCD IN RCRD: CPT | Mod: CPTII,S$GLB,, | Performed by: SURGERY

## 2024-07-09 PROCEDURE — 3008F BODY MASS INDEX DOCD: CPT | Mod: CPTII,S$GLB,, | Performed by: SURGERY

## 2024-07-09 PROCEDURE — 3074F SYST BP LT 130 MM HG: CPT | Mod: CPTII,S$GLB,, | Performed by: SURGERY

## 2024-07-09 NOTE — PROGRESS NOTES
General Surgery Office Visit   History and Physical    Patient Name: Marika Lindsey  YOB: 1970 (53 y.o.)  MRN: [id number]  Today's Date: 07/09/2024    Referring Md:   No referring provider defined for this encounter.    SUBJECTIVE:     Chief Complaint: Abdominal pain     History of Present Illness:  Marika Lindsey is a 53 y.o. female with PMHx of  hypothyroidism, GERD, and perforated diverticulitis s/p Renee's and subsequent colostomy closure in 2022  who presents to the clinic today with a ventral incisional hernia. She developed multiple abdominal wall abscesses in the post op period and last underwent an abdominal washout in January. She has now developed a large ventral hernia that is becoming more symptomatic. She states she is starting to have more frequent abdominal pain and recently noticed the lower right sided bulge grow larger. She denies nausea or vomiting. She has normal bowel function and is tolerating a regular diet without issues.     Patient denies personal history of MI, CVA, lung disease, DM  Previous abdominal surgeries include: ex lap with Renee's/ colostomy closure 2022, abdominal wall exploration 2024  Denies alcohol, tobacco, and elicit drug use. Previous smoker, quit 17 yrs ago  Not currently on any anticoagulants    Interval history 7/9/24:   Patient reports to clinic today for follow up for her ventral hernia. She reports continued abdominal pain since her last clinic visit. Worse with meals and exercise. Better with rest. No other known aggravating or alleviating factors. She has lost 7lb since her last visit. She has seen bariatrics and is awaiting work up for surgery but is unsure of the cost of this. Occasionally has some nausea that spontaneously resolves. Has intermittent constipation that she takes OTC miralax for with some relief. Denies fever, chills, emesis, and all other symptoms.       Review of patient's allergies indicates:   Allergen  Reactions    Adhesive Rash     Just abdomen   Please use paper tape       Past Medical History:   Diagnosis Date    Dehisced gastrointestinal anastomosis     Diverticulitis     GERD (gastroesophageal reflux disease)     Thyroid disease     Wound dehiscence      Past Surgical History:   Procedure Laterality Date    COLON SURGERY  2022    sigmoid colon removed    COLONOSCOPY      COLONOSCOPY      COLOSTOMY      DEBRIDEMENT, ABDOMEN N/A 2024    Procedure: DEBRIDEMENT, ABDOMEN;  Surgeon: Adelina Garces MD;  Location: [facility] OR;  Service: Colon and Rectal;  Laterality: N/A;  ABDOMENAL WALL  WOUND    INCISION AND DRAINAGE OF ABDOMEN  2024    Procedure: INCISION AND DRAINAGE, ABDOMEN;  Surgeon: Adelina Garces MD;  Location: [facility] OR;  Service: Colon and Rectal;;  incision and drainage of abdominal wall abcess    ROBOTIC CLOSURE, COLOSTOMY N/A 2022    Procedure: ROBOTIC CLOSURE, COLOSTOMY ;  Surgeon: Adelina Garces MD;  Location: [facility] OR;  Service: Colon and Rectal;  Laterality: N/A;  W/ FLEXIBLE SIGMOIDOSCOPY   LITHOTOMY EEA STAPLER       URETHRA SURGERY       No family history on file.  Social History     Tobacco Use    Smoking status: Former     Current packs/day: 0.00     Types: Cigarettes     Quit date: 10/19/2006     Years since quittin.7    Smokeless tobacco: Never   Substance Use Topics    Alcohol use: Not Currently     Comment: rare    Drug use: Not Currently        Review of Systems:  Review of Systems   Constitutional:  Negative for chills and fever.   Respiratory:  Negative for cough and shortness of breath.    Cardiovascular:  Negative for chest pain.   Gastrointestinal:  Positive for abdominal pain, constipation and nausea. Negative for diarrhea and vomiting.   Genitourinary:  Negative for dysuria and hematuria.   Musculoskeletal:  Negative for falls.   Skin:  Negative for rash.   Neurological:  Negative for dizziness and headaches.   Psychiatric/Behavioral:  Negative for substance  "abuse. The patient is nervous/anxious.    All other systems reviewed and are negative.      OBJECTIVE:     Vital Signs (Most Recent)  /75 (BP Location: Left arm, Patient Position: Sitting, BP Method: Medium (Automatic))   Pulse 73   Ht 5' 4" (1.626 m)   Wt 119 kg (262 lb 5.6 oz)   SpO2 98%   BMI 45.03 kg/m²     Physical Exam  Vitals and nursing note reviewed.   Constitutional:       General: She is not in acute distress.     Appearance: She is obese. She is not diaphoretic.      Comments: Room air   HENT:      Head: Normocephalic and atraumatic.      Mouth/Throat:      Mouth: Mucous membranes are moist.      Pharynx: Oropharynx is clear.   Eyes:      Extraocular Movements: Extraocular movements intact.      Conjunctiva/sclera: Conjunctivae normal.   Cardiovascular:      Rate and Rhythm: Normal rate.   Pulmonary:      Effort: Pulmonary effort is normal. No respiratory distress.   Abdominal:      General: There is no distension.      Palpations: Abdomen is soft.      Tenderness: There is no abdominal tenderness. There is no guarding or rebound.      Comments: Well healed surgical scar noted.   Large hernia in RLQ. Soft, reducible. Scar overlying with healing scab. No drainage or bleeding. Not TTP   Musculoskeletal:         General: No deformity.   Skin:     General: Skin is warm and dry.   Neurological:      Mental Status: She is alert and oriented to person, place, and time.           Imaging:   CT Abdomen Pelvis With IV Contrast Routine Oral Contrast  Order: [id number]  Status: Final result       Visible to patient: Yes (seen)       Next appt: None       Dx: Chronic wound infection of abdomen, s...    0 Result Notes  Details    Reading Physician Reading Date Result Priority   Rey Salazar MD  [phone]  [phone] 3/5/2024 Routine     Narrative & Impression  EXAMINATION:  CT ABDOMEN PELVIS WITH IV CONTRAST     CLINICAL HISTORY:  Abdominal abscess/infection suspected; Unspecified open wound of " abdominal wall, unspecified quadrant without penetration into peritoneal cavity, sequela     TECHNIQUE:  Low dose axial images, sagittal and coronal reformations were obtained from the lung bases to the pubic symphysis following the IV administration of 100 mL of Omnipaque 350 and the oral administration of 1000 mL of Omnipaque 12     COMPARISON:  CT scan of the abdomen pelvis dated 12/06/2023 and CT dated 10/18/2023.     FINDINGS:  There are no pleural effusions.  There is no evidence of a pneumothorax.  No airspace opacity is present.  No pulmonary nodules identified.     The heart is unremarkable.  There is normal tapering of the abdominal aorta.  The minimal calcifications along the course of the abdominal aorta and its branch vessels.  The celiac trunk, the SMA, and GUSTAVO are within normal limits.  The portal veins and mesenteric vessels are within normal limits.  There is a retroaortic left renal vein.     There is no evidence of lymphadenopathy in the abdomen or pelvis.     The esophagus, stomach, and duodenum are within normal limits.  The small bowel loops are unremarkable.  The appendix is not visualized.  There are no secondary findings of acute appendicitis.  The large bowel is unremarkable.     The liver is within normal limits.  The gallbladder is unremarkable.  The biliary tree is within normal limits.  The spleen is unremarkable.  The pancreas is within normal limits.  The adrenal glands are unremarkable.     The kidneys are unremarkable.  There is mild distention of the right ureter with no obstructing stone lesion identified along the course of the right ureter.  The urinary bladder is within normal limits.  The uterus and adnexal structures are within normal limits.     There is no evidence of free fluid in the abdomen or pelvis.  There is no evidence of free air.  There is no evidence of pneumatosis.  No portal venous air is identified.     The psoas margins are unremarkable.  There are postop  changes in the anterior abdominal wall laxity of the anterior abdominal wall.  There has been near complete resolution of the previous right abdominal wall abscess residual 4.0  X 1.1 cm collection remaining within the right abdominal wall.  This previously measured approximately 4.6 x 2.7 cm.  No additional abdominal wall collection is present.     There are degenerative changes in the osseous structures.  There is no evidence of a fracture.     Impression:     Postoperative changes in the anterior abdominal wall with near complete resolution of previous right anterior abdominal wall abscess.  Minimal trace residual abscess remaining measuring approximately 4.0 x 1.1 cm.     Diverticulosis coli without evidence of acute diverticulitis.     Distention of the right ureter with no obstructing lesion identified.  Follow-up with CT urogram may be obtained, as clinically warranted.     Additional findings as above.         ASSESSMENT/PLAN:     Marika Lindsey is a 53 y.o. female with PMHx of  hypothyroidism, GERD, and perforated diverticulitis s/p Renee's and subsequent colostomy closure in 2022  who presents to the clinic today with a ventral incisional hernia. Clinically stable without signs of incarceration/strangulation/obstruction, interested in repair     There are no diagnoses linked to this encounter.    - Discussed risks and benefits of both operative and nonoperative management with patient in detail. We discussed that hernias do not go away on their own, and that only surgery can repair a hernia. Options include scheduling elective repair of the hernia, or continuing a conservative approach and not repairing the hernia if it is overall asymptomatic or mildly symptomatic. Hernias tend to get larger in size over long periods of time, but they are safe to monitor no matter the size. The risk of strangulation was explained including worrisome symptoms of increased pain in the area, overlying skin  changes, or obstructive symptoms that could warrant emergent surgery, but that the risk of that happening is low and close observation of asymptomatic hernias is a safe option. Discussed her elevated risk for recurrence and need for weight loss prior to proceeding with operative repair. She voiced understanding of these and is agreeable to continue current plan of care and follow up with bariatrics as she just saw them 6/25 and has follow up in 1 month.   - ED precautions given  - RTC in 4 months  - Continue any current medications  - Call with any questions or concerns  - Discussed POC with patient. All questions were answered. Patient is agreeable to plan and verbalized understanding.     Case discussed with Dr. Lynch. Patient seen and examined by Dr. Lynch.      Joseph Mix, ARIANAS, PA-C  General Surgery  - [facility]

## 2024-09-05 ENCOUNTER — OFFICE VISIT (OUTPATIENT)
Dept: URGENT CARE | Facility: CLINIC | Age: 54
End: 2024-09-05
Payer: COMMERCIAL

## 2024-09-05 VITALS
TEMPERATURE: 98 F | RESPIRATION RATE: 18 BRPM | BODY MASS INDEX: 44.73 KG/M2 | HEART RATE: 84 BPM | DIASTOLIC BLOOD PRESSURE: 89 MMHG | HEIGHT: 64 IN | SYSTOLIC BLOOD PRESSURE: 144 MMHG | WEIGHT: 262 LBS | OXYGEN SATURATION: 95 %
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
## 2024-09-05 DIAGNOSIS — S93.602A FOOT SPRAIN, LEFT, INITIAL ENCOUNTER: ICD-10-CM

## 2024-09-05 PROCEDURE — 73630 X-RAY EXAM OF FOOT: CPT | Mod: FY,LT,S$GLB, | Performed by: RADIOLOGY

## 2024-09-05 NOTE — PROGRESS NOTES
Subjective:      Patient ID: Marika Lindsey is a 53 y.o. female.    Vitals:  vitals were not taken for this visit.     Chief Complaint: Foot Injury    This is a 53 y.o. female who presents today with a chief complaint of Fall. Patient fell down some wet steps yesterday evening injuring her left foot. Patient's foot is swollen and some of her toes are purple.       Foot Injury   The incident occurred 12 to 24 hours ago. The incident occurred at home. The injury mechanism was a fall. The pain is present in the left foot and left toes. The quality of the pain is described as burning. The pain is at a severity of 6/10. Associated symptoms include an inability to bear weight. The symptoms are aggravated by weight bearing. She has tried elevation and ice (aspirin) for the symptoms. The treatment provided no relief.       Musculoskeletal:  Positive for pain and joint swelling.   Skin:  Positive for erythema.   Allergic/Immunologic: Negative.    Hematologic/Lymphatic: Negative.    Psychiatric/Behavioral: Negative.      Objective:     Physical Exam   Constitutional: She is oriented to person, place, and time. No distress. obesity  HENT:   Head: Normocephalic and atraumatic.   Ears:   Right Ear: External ear normal.   Left Ear: External ear normal.   Eyes: Conjunctivae are normal. Pupils are equal, round, and reactive to light. Extraocular movement intact   Neck: Neck supple.   Pulmonary/Chest: Effort normal. No respiratory distress.   Abdominal: Normal appearance.   Musculoskeletal:         General: Swelling, tenderness and signs of injury present.      Right ankle: Normal. Achilles tendon normal.      Left ankle: She exhibits decreased range of motion and swelling. Tenderness. Achilles tendon exhibits no defect and normal Castano's test results.      Right lower leg: She exhibits no swelling. No edema.      Left lower leg: She exhibits no tenderness, no bony tenderness, no swelling and no laceration. No edema.       Right foot: Normal.      Left foot: Decreased range of motion. Tenderness and swelling present.   Neurological: no focal deficit. She is alert, oriented to person, place, and time and at baseline. She displays normal reflexes. Gait abnormal. Coordination normal.   Skin: Capillary refill takes less than 2 seconds. not left lower legbruising and erythema   Psychiatric: Her behavior is normal. Mood, judgment and thought content normal.   Nursing note and vitals reviewed.    Assessment:     Plan:   1. Fall, initial encounter  - XR FOOT COMPLETE 3 VIEW LEFT; Future    2. Foot sprain, left, initial encounter  - Walking Boot For Home Use   All results discussed with pt  Notes for work given

## 2024-09-27 ENCOUNTER — HOSPITAL ENCOUNTER (OUTPATIENT)
Dept: RADIOLOGY | Facility: HOSPITAL | Age: 54
Discharge: HOME | End: 2024-09-27
Payer: COMMERCIAL

## 2024-09-27 DIAGNOSIS — Z12.31 ENCOUNTER FOR SCREENING MAMMOGRAM FOR BREAST CANCER: ICD-10-CM

## 2024-09-27 PROCEDURE — 77067 SCR MAMMO BI INCL CAD: CPT | Mod: TC,PO

## 2024-09-27 PROCEDURE — 77067 SCR MAMMO BI INCL CAD: CPT | Mod: 26,,, | Performed by: RADIOLOGY

## 2024-09-27 PROCEDURE — 77063 BREAST TOMOSYNTHESIS BI: CPT | Mod: 26,,, | Performed by: RADIOLOGY

## 2024-10-21 ENCOUNTER — OFFICE VISIT (OUTPATIENT)
Dept: INTERNAL MEDICINE | Facility: CLINIC | Age: 54
End: 2024-10-21
Payer: COMMERCIAL

## 2024-10-21 ENCOUNTER — LAB VISIT (OUTPATIENT)
Dept: LAB | Facility: HOSPITAL | Age: 54
End: 2024-10-21
Payer: COMMERCIAL

## 2024-10-21 VITALS
HEART RATE: 95 BPM | DIASTOLIC BLOOD PRESSURE: 82 MMHG | HEIGHT: 64 IN | BODY MASS INDEX: 45.73 KG/M2 | SYSTOLIC BLOOD PRESSURE: 130 MMHG | OXYGEN SATURATION: 99 % | WEIGHT: 267.88 LBS

## 2024-10-21 DIAGNOSIS — E03.9 HYPOTHYROIDISM, UNSPECIFIED TYPE: ICD-10-CM

## 2024-10-21 DIAGNOSIS — E03.9 ACQUIRED HYPOTHYROIDISM: Primary | ICD-10-CM

## 2024-10-21 DIAGNOSIS — E78.5 HYPERLIPIDEMIA, UNSPECIFIED HYPERLIPIDEMIA TYPE: ICD-10-CM

## 2024-10-21 DIAGNOSIS — G47.33 OSA (OBSTRUCTIVE SLEEP APNEA): ICD-10-CM

## 2024-10-21 DIAGNOSIS — E03.9 ACQUIRED HYPOTHYROIDISM: ICD-10-CM

## 2024-10-21 LAB
ALBUMIN SERPL BCP-MCNC: 4.3 G/DL (ref 3.5–5.2)
ALP SERPL-CCNC: 53 U/L (ref 40–150)
ALT SERPL W/O P-5'-P-CCNC: 23 U/L (ref 10–44)
ANION GAP SERPL CALC-SCNC: 11 MMOL/L (ref 8–16)
AST SERPL-CCNC: 23 U/L (ref 10–40)
BILIRUB SERPL-MCNC: 0.3 MG/DL (ref 0.1–1)
BUN SERPL-MCNC: 18 MG/DL (ref 6–20)
CALCIUM SERPL-MCNC: 9.9 MG/DL (ref 8.7–10.5)
CHLORIDE SERPL-SCNC: 104 MMOL/L (ref 95–110)
CHOLEST SERPL-MCNC: 268 MG/DL (ref 120–199)
CHOLEST/HDLC SERPL: 6.4 {RATIO} (ref 2–5)
CO2 SERPL-SCNC: 25 MMOL/L (ref 23–29)
CREAT SERPL-MCNC: 1.2 MG/DL (ref 0.5–1.4)
EST. GFR  (NO RACE VARIABLE): 54.1 ML/MIN/1.73 M^2
GLUCOSE SERPL-MCNC: 106 MG/DL (ref 70–110)
HDLC SERPL-MCNC: 42 MG/DL (ref 40–75)
HDLC SERPL: 15.7 % (ref 20–50)
LDLC SERPL CALC-MCNC: 163 MG/DL (ref 63–159)
NONHDLC SERPL-MCNC: 226 MG/DL
POTASSIUM SERPL-SCNC: 4.2 MMOL/L (ref 3.5–5.1)
PROT SERPL-MCNC: 7.4 G/DL (ref 6–8.4)
SODIUM SERPL-SCNC: 140 MMOL/L (ref 136–145)
T4 FREE SERPL-MCNC: 0.9 NG/DL (ref 0.71–1.51)
TRIGL SERPL-MCNC: 315 MG/DL (ref 30–150)
TSH SERPL DL<=0.005 MIU/L-ACNC: 2.74 UIU/ML (ref 0.4–4)

## 2024-10-21 PROCEDURE — 99999 PR PBB SHADOW E&M-EST. PATIENT-LVL IV: CPT | Mod: PBBFAC,,,

## 2024-10-21 PROCEDURE — 36415 COLL VENOUS BLD VENIPUNCTURE: CPT

## 2024-10-21 PROCEDURE — 80061 LIPID PANEL: CPT

## 2024-10-21 PROCEDURE — 84443 ASSAY THYROID STIM HORMONE: CPT

## 2024-10-21 PROCEDURE — 80053 COMPREHEN METABOLIC PANEL: CPT

## 2024-10-21 PROCEDURE — 84439 ASSAY OF FREE THYROXINE: CPT

## 2024-10-21 RX ORDER — LEVOTHYROXINE SODIUM 50 UG/1
50 TABLET ORAL
Qty: 90 TABLET | Refills: 3 | Status: SHIPPED | OUTPATIENT
Start: 2024-10-21

## 2024-10-21 NOTE — PROGRESS NOTES
Subjective     Chief Complaint: Thyroid studies, and Sleep Apnea     History of Present Illness:  Ms. Marika Lindsey is a 53 y.o. female with hx of hypothyroidism, and complicated GI surgical hx est. With [facility] and bariatrics outside our system here today for thyroid testing and concerns of sleep apnea. Patient concerned regarding inability to lose weight despite dietary modifications and routine exercise (10k steps a day) Has attempted other alternatives such as ozempic without success and bariatric surgery which was cost prohibitive. Further has been told regarding sleep apnea like symptoms however has not been evaluated as of yet.     Review of Systems   Constitutional:  Positive for malaise/fatigue. Negative for chills, fever and weight loss.   Respiratory:  Negative for shortness of breath and wheezing.    Cardiovascular:  Negative for chest pain and leg swelling.       PAST HISTORY:     Past Medical History:   Diagnosis Date    Dehisced gastrointestinal anastomosis     Diverticulitis     GERD (gastroesophageal reflux disease)     Thyroid disease     Wound dehiscence        Past Surgical History:   Procedure Laterality Date    COLON SURGERY  01/2022    sigmoid colon removed    COLONOSCOPY  2021    COLONOSCOPY  2022    COLOSTOMY      DEBRIDEMENT, ABDOMEN N/A 1/9/2024    Procedure: DEBRIDEMENT, ABDOMEN;  Surgeon: Adelina Garces MD;  Location: [facility] OR;  Service: Colon and Rectal;  Laterality: N/A;  ABDOMENAL WALL  WOUND    INCISION AND DRAINAGE OF ABDOMEN  1/9/2024    Procedure: INCISION AND DRAINAGE, ABDOMEN;  Surgeon: Adelina Garces MD;  Location: [facility] OR;  Service: Colon and Rectal;;  incision and drainage of abdominal wall abcess    ROBOTIC CLOSURE, COLOSTOMY N/A 6/14/2022    Procedure: ROBOTIC CLOSURE, COLOSTOMY ;  Surgeon: Adelina Garces MD;  Location: [facility] OR;  Service: Colon and Rectal;  Laterality: N/A;  W/ FLEXIBLE SIGMOIDOSCOPY   LITHOTOMY EEA STAPLER       URETHRA SURGERY    "      No family history on file.    Social History     Tobacco Use    Smoking status: Former     Current packs/day: 0.00     Types: Cigarettes     Quit date: 10/19/2006     Years since quittin.0    Smokeless tobacco: Never   Substance Use Topics    Alcohol use: Not Currently     Comment: rare    Drug use: Not Currently       MEDICATIONS & ALLERGIES:     Current Outpatient Medications on File Prior to Visit   Medication Sig    [DISCONTINUED] levothyroxine (SYNTHROID) 50 MCG tablet Take 1 tablet (50 mcg total) by mouth before breakfast.     Current Facility-Administered Medications on File Prior to Visit   Medication    LIDOcaine (PF) 10 mg/ml (1%) injection 10 mg    mupirocin 2 % ointment    scopolamine 1.3-1.5 mg (1 mg over 3 days) 1 patch       Review of patient's allergies indicates:   Allergen Reactions    Adhesive Rash     Just abdomen   Please use paper tape       OBJECTIVE:     Vital Signs:  Vitals:    10/21/24 1533   BP: 130/82   BP Location: Left arm   Patient Position: Sitting   Pulse: 95   SpO2: 99%   Weight: 121.5 kg (267 lb 13.7 oz)   Height: 5' 4" (1.626 m)       Body mass index is 45.98 kg/m².     Physical Exam  Constitutional:       General: She is not in acute distress.     Appearance: Normal appearance. She is obese. She is not ill-appearing, toxic-appearing or diaphoretic.   HENT:      Head: Normocephalic and atraumatic.   Cardiovascular:      Rate and Rhythm: Normal rate and regular rhythm.      Pulses: Normal pulses.   Pulmonary:      Effort: Pulmonary effort is normal. No respiratory distress.      Breath sounds: Normal breath sounds. No wheezing or rales.   Chest:      Chest wall: No tenderness.   Abdominal:      General: Abdomen is flat. Bowel sounds are normal. There is no distension.      Palpations: Abdomen is soft. There is no mass.      Tenderness: There is no abdominal tenderness. There is no guarding.      Hernia: No hernia is present.   Musculoskeletal:         General: No " swelling, tenderness, deformity or signs of injury. Normal range of motion.   Skin:     General: Skin is warm and dry.      Coloration: Skin is not jaundiced.      Findings: No bruising or erythema.   Neurological:      General: No focal deficit present.      Mental Status: She is alert and oriented to person, place, and time.      Cranial Nerves: No cranial nerve deficit.      Motor: No weakness.   Psychiatric:         Mood and Affect: Mood normal.         Behavior: Behavior normal.         Thought Content: Thought content normal.         Judgment: Judgment normal.       Health Maintenance Due   Topic Date Due    Pneumococcal Vaccines (Age 0-64) (1 of 2 - PCV) Never done    TETANUS VACCINE  Never done    Influenza Vaccine (1) 09/01/2024    COVID-19 Vaccine (4 - 2024-25 season) 09/01/2024         ASSESSMENT & PLAN:   Ms. Marika Lindsey is a 53 y.o. female here for f/u with thyroid and sleep apnea.     Plan:   - STOP BANG criteria concerning for high likelihood of sleep apnea, sleep clinic order placed   -Refilled synthroid   - New CMP, free T4, TSH, and lipid panel(high LDL 3 years prior), will report and f/u labs using [facility]    - Dietician referral placed   - [facility] Fit program referral placed   - Counseled on healthy eating habits, and reinforced her current changes and lifestyle modifications     Acquired hypothyroidism  -     Ambulatory referral/consult to Nutrition Services; Future; Expected date: 10/28/2024  -     Ambulatory referral/consult to Medical Fitness ([employer]); Future; Expected date: 10/28/2024  -     [facility] ASSIGN QUESTIONNAIRE SERIES ([facility] [employer])  -     [facility] Patient Entered [facility] ([facility] [employer])  -     TSH; Future; Expected date: 10/21/2024  -     T4, FREE; Future; Expected date: 10/21/2024  -     COMPREHENSIVE METABOLIC PANEL; Future; Expected date: 10/21/2024    TULIO (obstructive sleep apnea)  -     Ambulatory referral/consult to Sleep Disorders; Future; Expected date:  10/28/2024  -     Ambulatory referral/consult to Nutrition Services; Future; Expected date: 10/28/2024  -     [facility] Future; Expected date: 10/28/2024  -     [facility] ([facility])  -     [facility] Patient [facility]    Hypothyroidism, unspecified type  -     levothyroxine (SYNTHROID) 50 MCG tablet; Take 1 tablet (50 mcg total) by mouth before breakfast.  Dispense: 90 tablet; Refill: 3    Hyperlipidemia, unspecified hyperlipidemia type  -     LIPID PANEL; Future; Expected date: 10/21/2024  -     COMPREHENSIVE METABOLIC PANEL; Future; Expected date: 10/21/2024        RTC in 6 months or sooner PRN     Discussed with Dr. Knowles - staff attestation to follow      Vijay Olivas DO, MSB   Internal Medicine, PGY-3  [facility]  [address] [place] [id number]  [phone]

## 2024-10-30 ENCOUNTER — OFFICE VISIT (OUTPATIENT)
Dept: SLEEP MEDICINE | Facility: CLINIC | Age: 54
End: 2024-10-30
Payer: COMMERCIAL

## 2024-10-30 VITALS
WEIGHT: 266.56 LBS | BODY MASS INDEX: 45.51 KG/M2 | HEART RATE: 78 BPM | SYSTOLIC BLOOD PRESSURE: 130 MMHG | DIASTOLIC BLOOD PRESSURE: 80 MMHG | HEIGHT: 64 IN

## 2024-10-30 DIAGNOSIS — R06.83 SNORING: Primary | ICD-10-CM

## 2024-10-30 DIAGNOSIS — G47.33 OSA (OBSTRUCTIVE SLEEP APNEA): ICD-10-CM

## 2024-10-30 DIAGNOSIS — G47.19 OTHER HYPERSOMNIA: ICD-10-CM

## 2024-10-30 DIAGNOSIS — G47.00 INSOMNIA, UNSPECIFIED TYPE: ICD-10-CM

## 2024-10-30 PROCEDURE — 3079F DIAST BP 80-89 MM HG: CPT | Mod: CPTII,S$GLB,, | Performed by: NURSE PRACTITIONER

## 2024-10-30 PROCEDURE — 99204 OFFICE O/P NEW MOD 45 MIN: CPT | Mod: S$GLB,,, | Performed by: NURSE PRACTITIONER

## 2024-10-30 PROCEDURE — 1160F RVW MEDS BY RX/DR IN RCRD: CPT | Mod: CPTII,S$GLB,, | Performed by: NURSE PRACTITIONER

## 2024-10-30 PROCEDURE — 1159F MED LIST DOCD IN RCRD: CPT | Mod: CPTII,S$GLB,, | Performed by: NURSE PRACTITIONER

## 2024-10-30 PROCEDURE — 99999 PR PBB SHADOW E&M-EST. PATIENT-LVL III: CPT | Mod: PBBFAC,,, | Performed by: NURSE PRACTITIONER

## 2024-10-30 PROCEDURE — 3075F SYST BP GE 130 - 139MM HG: CPT | Mod: CPTII,S$GLB,, | Performed by: NURSE PRACTITIONER

## 2024-10-30 PROCEDURE — 3008F BODY MASS INDEX DOCD: CPT | Mod: CPTII,S$GLB,, | Performed by: NURSE PRACTITIONER

## 2024-11-06 ENCOUNTER — HOSPITAL ENCOUNTER (OUTPATIENT)
Dept: SLEEP MEDICINE | Facility: OTHER | Age: 54
Discharge: HOME | End: 2024-11-06
Attending: NURSE PRACTITIONER
Payer: COMMERCIAL

## 2024-11-06 DIAGNOSIS — G47.00 INSOMNIA, UNSPECIFIED TYPE: ICD-10-CM

## 2024-11-06 DIAGNOSIS — G47.19 OTHER HYPERSOMNIA: ICD-10-CM

## 2024-11-06 DIAGNOSIS — G47.33 OSA (OBSTRUCTIVE SLEEP APNEA): ICD-10-CM

## 2024-11-06 DIAGNOSIS — R06.83 SNORING: ICD-10-CM

## 2024-11-06 PROCEDURE — 95800 SLP STDY UNATTENDED: CPT

## 2024-11-07 PROBLEM — G47.33 OSA (OBSTRUCTIVE SLEEP APNEA): Status: ACTIVE | Noted: 2024-11-07

## 2024-11-11 ENCOUNTER — PATIENT MESSAGE (OUTPATIENT)
Dept: SLEEP MEDICINE | Facility: CLINIC | Age: 54
End: 2024-11-11
Payer: COMMERCIAL

## 2024-11-11 DIAGNOSIS — G47.33 OBSTRUCTIVE SLEEP APNEA: Primary | ICD-10-CM

## 2024-11-26 ENCOUNTER — PATIENT MESSAGE (OUTPATIENT)
Dept: INTERNAL MEDICINE | Facility: CLINIC | Age: 54
End: 2024-11-26
Payer: COMMERCIAL

## 2024-11-27 DIAGNOSIS — E66.01 SEVERE OBESITY (BMI >= 40): Primary | ICD-10-CM

## 2024-11-27 DIAGNOSIS — E78.00 PURE HYPERCHOLESTEROLEMIA: ICD-10-CM

## 2024-12-23 ENCOUNTER — PATIENT MESSAGE (OUTPATIENT)
Dept: SLEEP MEDICINE | Facility: CLINIC | Age: 54
End: 2024-12-23
Payer: COMMERCIAL

## 2025-01-06 ENCOUNTER — OFFICE VISIT (OUTPATIENT)
Dept: SLEEP MEDICINE | Facility: CLINIC | Age: 55
End: 2025-01-06
Payer: MEDICAID

## 2025-01-06 VITALS
HEIGHT: 64 IN | DIASTOLIC BLOOD PRESSURE: 85 MMHG | WEIGHT: 264.56 LBS | HEART RATE: 82 BPM | SYSTOLIC BLOOD PRESSURE: 129 MMHG | BODY MASS INDEX: 45.17 KG/M2

## 2025-01-06 DIAGNOSIS — G47.33 OBSTRUCTIVE SLEEP APNEA: Primary | ICD-10-CM

## 2025-01-06 PROCEDURE — 99999 PR PBB SHADOW E&M-EST. PATIENT-LVL III: CPT | Mod: PBBFAC,,, | Performed by: NURSE PRACTITIONER

## 2025-01-06 PROCEDURE — 1159F MED LIST DOCD IN RCRD: CPT | Mod: CPTII,,, | Performed by: NURSE PRACTITIONER

## 2025-01-06 PROCEDURE — 99214 OFFICE O/P EST MOD 30 MIN: CPT | Mod: S$PBB,,, | Performed by: NURSE PRACTITIONER

## 2025-01-06 PROCEDURE — 99213 OFFICE O/P EST LOW 20 MIN: CPT | Mod: PBBFAC | Performed by: NURSE PRACTITIONER

## 2025-01-06 PROCEDURE — 3074F SYST BP LT 130 MM HG: CPT | Mod: CPTII,,, | Performed by: NURSE PRACTITIONER

## 2025-01-06 PROCEDURE — 3008F BODY MASS INDEX DOCD: CPT | Mod: CPTII,,, | Performed by: NURSE PRACTITIONER

## 2025-01-06 PROCEDURE — 3079F DIAST BP 80-89 MM HG: CPT | Mod: CPTII,,, | Performed by: NURSE PRACTITIONER

## 2025-01-06 PROCEDURE — 1160F RVW MEDS BY RX/DR IN RCRD: CPT | Mod: CPTII,,, | Performed by: NURSE PRACTITIONER

## 2025-01-06 NOTE — PROGRESS NOTES
"ESTABLISHED PATIENT VISIT    Here today for:  follow-up on new CPAP machine    Since last visit:   See assessment below    Past Medical History:   Diagnosis Date    Dehisced gastrointestinal anastomosis     Diverticulitis     GERD (gastroesophageal reflux disease)     Thyroid disease     Wound dehiscence      Patient Active Problem List   Diagnosis    Colostomy in place    Abscess    Chronic wound infection of abdomen    Acquired hypothyroidism    Leg pain    Severe obesity (BMI >= 40)    Internal hemorrhoids    Abnormal mammogram of both breasts    Abnormal TSH    Atypical mole    Cervical high risk human papillomavirus (HPV) DNA test positive    Class 2 obesity due to excess calories with body mass index (BMI) of 35.0 to 35.9 in adult    Cyst of left breast    Diverticulitis large intestine    Diverticulitis of colon with perforation    Perforated bowel    Pure hypercholesterolemia    SIRS (systemic inflammatory response syndrome)    Skin lesion    Traumatic amputation of fingertip    Unsatisfactory cervical Papanicolaou smear    Abdominal wall fluid collections    Hx of diverticulitis of colon    Cystitis    Foot sprain, left, initial encounter    Fall    TULIO (obstructive sleep apnea)     Current Outpatient Medications:     levothyroxine (SYNTHROID) 50 MCG tablet, Take 1 tablet (50 mcg total) by mouth before breakfast., Disp: 90 tablet, Rfl: 3  No current facility-administered medications for this visit.    Facility-Administered Medications Ordered in Other Visits:     LIDOcaine (PF) 10 mg/ml (1%) injection 10 mg, 1 mL, Intradermal, Once, Candace Mleendez NP    mupirocin 2 % ointment, , Nasal, On Call Procedure, Candace Melendez NP, Given at 06/14/22 0554    scopolamine 1.3-1.5 mg (1 mg over 3 days) 1 patch, 1 patch, Transdermal, Q3 Days, Candace Melendez NP, 1 patch at 06/14/22 0548      Vitals:    01/06/25 1527   BP: 129/85   Pulse: 82   Weight: 120 kg (264 lb 8.8 oz)   Height: 5' 4" (1.626 m) " "    Physical Exam:    GEN:   Well-appearing  Psych:  Appropriate affect, demonstrates insight  SKIN:  No rash on the face or bridge of the nose      LABS:   Lab Results   Component Value Date    CO2 25 10/21/2024         No echocardiogram results found for the past 12 months     No results found for: "FERRITIN"    RECORDS REVIEWED:      ASSESSMENT    Sig PMH:  PROBLEM DESCRIPTION/ Sx on Presentation Interval hx STATUS PLAN     Screening TULIO   Presentation:     Snored since a child (had a broken nose), but snoring has become worse.  Multiple people have witnessed apneic episodes. Is interested in screening for sleep apnea.    yes - snoring  yes - gasping arousals/coughing  yes - witnessed apneas, pauses in breathing    yes - night sweats    occasional - AM headaches    yes - dry mouth/sore throat    PAP history   Dx Study AH HST 11.6.24: AHI 16, RDI 29   Mask Nasal    DME HME   My Air    CPAP age AV, setup 11.26.24, airsense 11   PAP altn    Benefits    PROBS         -Had sleep study done and started CPAP.    HST 11.6.24: AHI 16, RDI 29    12.23.24- pt msg'd saying she's having a hard time with cpap. Download 12/28/2024: 23/30d x 4h41m: 6-12 (11.2/12/12), PS 2, leak 0.1/3.2/27.2, AHI 1.6.  ----Changed max from 12 to 14.    Download Today:  1.06.2025: 21/30 x 4h30m: 6-14 (11.5/12.4/12.6), leak 0/4.0/25.8, PS 2, AHI 1.5    -Still has trouble keeping the mask on for the entire night; inevitably she takes it off after 4 hours.  Does not want FFM, but mouth-vents with nasal mask.     controlled     PAP PLAN   E min 6 cwp    I max 14 cwp   PS/epr    RAMP    Other    Altn.    Press  chg      Residual predicted AHI within optimal range.    Pt is using and benefitting from CPAP therapy.    Could try chin strap, mouth tape, or creating seal with her tongue to help with mouth-venting.    Supplies ordered.       Daytime Sx     In general does not feel refreshed when waking up in the morning. Sometimes feels excessively sleepy " during the day and/or periods of rest.     ESS 10/24 on intake          Wakes up feeling more well-rested, but still pretty tired.   largely controlled   -continue treatment of TULIO as above     Insomnia     no - difficulty with sleep onset    yes - difficulty with sleep maintenance    SLEEP SCHEDULE   Duration    Wind- down    Envmnt    CBTi    Meds prior    Meds now    Bed Time 9-11PM  9-12AM   Lights out    Latency 10 min   Arousals 5 times/night   Back to sleep 10 min   Stim. ctrl    Wake time 630-9AM (work)  7-9AM (off)   Caffeine Limiting to morning only   Naps 1   Nocturia 3-4x   Work                Still wakes up, but less frequently. 2-3x night.   partially controlled   -will reassess after evaluation for sleep-disordered breathing  WIND-DOWN: try to establish a wind-down routine    CAFFEINE: avoid caffeine within 12-16 hours of bed time  ALCOHOL: avoid alcohol in the evening  -LIMIT TIB: limit time in bed to no more than 8 hours    -STIMULUS CONTROL: avoid being in bed and frustrated  -WAKE TIME: keep a consistent wake time even on days off   -BED TIME: establish a regular bed time      -Will try melatonin/magnesium to help with sleep maintenance PRN     Nocturia     x 3-4 per sleep period    persists          RTC:  yearly or sooner if problems arise

## 2025-01-15 ENCOUNTER — PATIENT MESSAGE (OUTPATIENT)
Dept: SLEEP MEDICINE | Facility: CLINIC | Age: 55
End: 2025-01-15
Payer: MEDICAID

## 2025-03-16 ENCOUNTER — PATIENT MESSAGE (OUTPATIENT)
Dept: SURGERY | Facility: CLINIC | Age: 55
End: 2025-03-16
Payer: MEDICAID

## 2025-03-24 ENCOUNTER — LAB VISIT (OUTPATIENT)
Dept: LAB | Facility: HOSPITAL | Age: 55
End: 2025-03-24
Attending: SURGERY
Payer: MEDICAID

## 2025-03-24 ENCOUNTER — OFFICE VISIT (OUTPATIENT)
Dept: SURGERY | Facility: CLINIC | Age: 55
End: 2025-03-24
Payer: MEDICAID

## 2025-03-24 VITALS
RESPIRATION RATE: 18 BRPM | WEIGHT: 266.75 LBS | HEART RATE: 97 BPM | HEIGHT: 64 IN | DIASTOLIC BLOOD PRESSURE: 83 MMHG | BODY MASS INDEX: 45.54 KG/M2 | SYSTOLIC BLOOD PRESSURE: 134 MMHG

## 2025-03-24 DIAGNOSIS — K62.89 PROCTITIS: ICD-10-CM

## 2025-03-24 LAB
ANION GAP (OHS): 11 MMOL/L (ref 8–16)
BUN SERPL-MCNC: 21 MG/DL (ref 6–20)
CALCIUM SERPL-MCNC: 9.9 MG/DL (ref 8.7–10.5)
CHLORIDE SERPL-SCNC: 107 MMOL/L (ref 95–110)
CO2 SERPL-SCNC: 21 MMOL/L (ref 23–29)
CREAT SERPL-MCNC: 1 MG/DL (ref 0.5–1.4)
CRP SERPL-MCNC: 3.8 MG/L
GFR SERPLBLD CREATININE-BSD FMLA CKD-EPI: >60 ML/MIN/1.73/M2
GLUCOSE SERPL-MCNC: 90 MG/DL (ref 70–110)
POTASSIUM SERPL-SCNC: 4.3 MMOL/L (ref 3.5–5.1)
SODIUM SERPL-SCNC: 139 MMOL/L (ref 136–145)

## 2025-03-24 PROCEDURE — 99999 PR PBB SHADOW E&M-EST. PATIENT-LVL III: CPT | Mod: PBBFAC,,, | Performed by: SURGERY

## 2025-03-24 PROCEDURE — 36415 COLL VENOUS BLD VENIPUNCTURE: CPT

## 2025-03-24 PROCEDURE — 99213 OFFICE O/P EST LOW 20 MIN: CPT | Mod: PBBFAC | Performed by: SURGERY

## 2025-03-24 PROCEDURE — 86140 C-REACTIVE PROTEIN: CPT

## 2025-03-24 PROCEDURE — 80048 BASIC METABOLIC PNL TOTAL CA: CPT

## 2025-03-24 RX ORDER — AMOXICILLIN AND CLAVULANATE POTASSIUM 875; 125 MG/1; MG/1
1 TABLET, FILM COATED ORAL EVERY 12 HOURS
Qty: 20 TABLET | Refills: 0 | Status: SHIPPED | OUTPATIENT
Start: 2025-03-24

## 2025-03-24 NOTE — PROGRESS NOTES
Colon & Rectal Surgery Clinic Follow Up    HPI:   Marika Lindsey is a 54 y.o. female who presents for follow up of perforated diverticulitis initially managed at [facility] 1/2022 with exploratory laparotomy and Renee's.       6/2022 robotic colostomy closure      1/2024 abdominal wall exploration with excision of suture material      3/4/2024 Wound healed.  Denies drainage.  Diarrhea resolved.  Patient reports feeling of inflammation that has occurred prior to previous abscesses.      Interval history:   Abdominal wound fully healed.  Has followed up with Dr. Lynch regarding hernia repair.  Referred to bariatric clinic.  Having fecal urgency and foul smelling stools similar to prior episode of proctitis.        Objective:   Vitals:    03/24/25 1502   BP: 134/83   Pulse: 97   Resp: 18        Physical Exam   Gen: well developed female, NAD  HEENT: normocephalic, atraumatic, PERRL, EOMI   CV: RRR, no murmurs  Resp: nonlabored, CTAB   Abd: soft, NTND, well healed incisions   MSK: no gross deformities, no cyanosis or edema     Assessment and Plan:   Marika Lindsey  is a 54 y.o. 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

## 2025-03-25 ENCOUNTER — TELEPHONE (OUTPATIENT)
Dept: ENDOSCOPY | Facility: HOSPITAL | Age: 55
End: 2025-03-25
Payer: MEDICAID

## 2025-03-25 NOTE — TELEPHONE ENCOUNTER
"----- Message from Dorina sent at 3/25/2025  8:44 AM CDT -----    ----- Message -----  From: Adelina Garces MD  Sent: 3/24/2025   3:26 PM CDT  To: [facility] Patients    Procedure: ColonoscopyDiagnosis: Abnormal finding on GI tract imagingProcedure Timin-12 weeks#If within 4 weeks selected, please hipolito as high priority##If greater than 12 weeks, please select "5-12 weeks" and delay sending until 3 months prior to requested date# Location: Any SiteAdditional Scheduling Information: No scheduling concernsPrep Specifications:Standard prepIs the patient taking a GLP-1 Agonist:noHave you attached a patient to this message: yes  "

## 2025-04-01 ENCOUNTER — HOSPITAL ENCOUNTER (OUTPATIENT)
Dept: RADIOLOGY | Facility: HOSPITAL | Age: 55
Discharge: HOME | End: 2025-04-01
Attending: SURGERY
Payer: MEDICAID

## 2025-04-01 ENCOUNTER — TELEPHONE (OUTPATIENT)
Dept: ENDOSCOPY | Facility: HOSPITAL | Age: 55
End: 2025-04-01
Payer: MEDICAID

## 2025-04-01 ENCOUNTER — PATIENT MESSAGE (OUTPATIENT)
Dept: ENDOSCOPY | Facility: HOSPITAL | Age: 55
End: 2025-04-01
Payer: MEDICAID

## 2025-04-01 DIAGNOSIS — R93.3 ABNORMAL FINDING ON GI TRACT IMAGING: Primary | ICD-10-CM

## 2025-04-01 PROCEDURE — 74177 CT ABD & PELVIS W/CONTRAST: CPT | Mod: TC

## 2025-04-01 PROCEDURE — 25500020 PHARM REV CODE 255: Performed by: SURGERY

## 2025-04-01 RX ADMIN — IOHEXOL 100 ML: 350 INJECTION, SOLUTION INTRAVENOUS at 05:04

## 2025-04-01 NOTE — TELEPHONE ENCOUNTER
Referral for procedure from Telephone call       Spoke to pt to schedule procedure(s) Colonoscopy       Physician to perform procedure(s) Dr. GALILEO Garces  Date of Procedure (s) 4/22/25  Arrival Time 9:10 AM  Time of Procedure(s) 10:10 AM   Location of Procedure(s) [place] [address]nd Floor  Type of Rx Prep sent to patient: Miralax  Instructions provided to patient via [facility]    Patient was informed on the following information and verbalized understanding. Screening questionnaire reviewed with patient and complete. If procedure requires anesthesia, a responsible adult needs to be present to accompany the patient home, patient cannot drive after receiving anesthesia. Appointment details are tentative, especially check-in time. Patient will receive a prep-op call 7 days prior to confirm check-in time for procedure. If applicable the patient should contact their pharmacy to verify Rx for procedure prep is ready for pick-up. Patient was advised to call the scheduling department at [phone] if pharmacy states no Rx is available. Patient was advised to call the endoscopy scheduling department if any questions or concerns arise.      [facility] Scheduling Department

## 2025-04-07 ENCOUNTER — PATIENT MESSAGE (OUTPATIENT)
Dept: SURGERY | Facility: CLINIC | Age: 55
End: 2025-04-07
Payer: MEDICAID

## 2025-04-08 ENCOUNTER — APPOINTMENT (OUTPATIENT)
Dept: LAB | Facility: HOSPITAL | Age: 55
End: 2025-04-08
Payer: MEDICAID

## 2025-04-09 ENCOUNTER — TELEPHONE (OUTPATIENT)
Dept: ENDOSCOPY | Facility: HOSPITAL | Age: 55
End: 2025-04-09
Payer: MEDICAID

## 2025-04-09 NOTE — TELEPHONE ENCOUNTER
Rescheduled pt to sooner appt per MD request. Notified pt of new date time. Pt verbalized understanding. New instructions via portal

## 2025-04-14 ENCOUNTER — ANESTHESIA EVENT (OUTPATIENT)
Dept: ENDOSCOPY | Facility: HOSPITAL | Age: 55
End: 2025-04-14
Payer: MEDICAID

## 2025-04-14 NOTE — ANESTHESIA PREPROCEDURE EVALUATION
04/14/2025  Marika Lindsey is a 54 y.o., female.      Pre-op Assessment    I have reviewed the Patient Summary Reports.     I have reviewed the Nursing Notes. I have reviewed the NPO Status.   I have reviewed the Medications.     Review of Systems  Social:  Non-Smoker       Hematology/Oncology:  Hematology Normal   Oncology Normal                                   EENT/Dental:  EENT/Dental Normal           Cardiovascular:  Cardiovascular Normal                                              Pulmonary:        Sleep Apnea                Renal/:  Renal/ Normal                 Hepatic/GI:     GERD                Musculoskeletal:  Musculoskeletal Normal                Neurological:  Neurology Normal                                      Endocrine:   Hypothyroidism        Morbid Obesity / BMI > 40  Dermatological:  Skin Normal    Psych:  Psychiatric Normal                       Anesthesia Plan  Type of Anesthesia, risks & benefits discussed:    Anesthesia Type: Gen Natural Airway  Intra-op Monitoring Plan: Standard ASA Monitors  Induction:  IV  Informed Consent: Informed consent signed with the Patient and all parties understand the risks and agree with anesthesia plan.  All questions answered.   ASA Score: 3  Day of Surgery Review of History & Physical: H&P Update referred to the surgeon/provider.    Ready For Surgery From Anesthesia Perspective.     .

## 2025-04-15 ENCOUNTER — HOSPITAL ENCOUNTER (OUTPATIENT)
Facility: HOSPITAL | Age: 55
Discharge: HOME | End: 2025-04-15
Attending: SURGERY | Admitting: SURGERY
Payer: MEDICAID

## 2025-04-15 ENCOUNTER — ANESTHESIA (OUTPATIENT)
Dept: ENDOSCOPY | Facility: HOSPITAL | Age: 55
End: 2025-04-15
Payer: MEDICAID

## 2025-04-15 VITALS
SYSTOLIC BLOOD PRESSURE: 120 MMHG | BODY MASS INDEX: 46.1 KG/M2 | TEMPERATURE: 98 F | HEIGHT: 64 IN | WEIGHT: 270 LBS | RESPIRATION RATE: 18 BRPM | HEART RATE: 87 BPM | OXYGEN SATURATION: 96 % | DIASTOLIC BLOOD PRESSURE: 69 MMHG

## 2025-04-15 DIAGNOSIS — K57.20 DIVERTICULITIS OF COLON WITH PERFORATION: Primary | ICD-10-CM

## 2025-04-15 DIAGNOSIS — R93.3 ABNORMAL FINDING ON GI TRACT IMAGING: ICD-10-CM

## 2025-04-15 DIAGNOSIS — Z12.11 ENCOUNTER FOR SCREENING COLONOSCOPY: ICD-10-CM

## 2025-04-15 PROCEDURE — 25000003 PHARM REV CODE 250: Performed by: NURSE ANESTHETIST, CERTIFIED REGISTERED

## 2025-04-15 PROCEDURE — 37000008 HC ANESTHESIA 1ST 15 MINUTES: Performed by: SURGERY

## 2025-04-15 PROCEDURE — 99900035 HC TECH TIME PER 15 MIN (STAT)

## 2025-04-15 PROCEDURE — 88305 TISSUE EXAM BY PATHOLOGIST: CPT | Mod: TC,91 | Performed by: SURGERY

## 2025-04-15 PROCEDURE — 27201012 HC FORCEPS, HOT/COLD, DISP: Performed by: SURGERY

## 2025-04-15 PROCEDURE — 45380 COLONOSCOPY AND BIOPSY: CPT | Mod: ,,, | Performed by: SURGERY

## 2025-04-15 PROCEDURE — 63600175 PHARM REV CODE 636 W HCPCS: Performed by: NURSE ANESTHETIST, CERTIFIED REGISTERED

## 2025-04-15 PROCEDURE — 37000009 HC ANESTHESIA EA ADD 15 MINS: Performed by: SURGERY

## 2025-04-15 PROCEDURE — 94761 N-INVAS EAR/PLS OXIMETRY MLT: CPT

## 2025-04-15 PROCEDURE — 45380 COLONOSCOPY AND BIOPSY: CPT | Performed by: SURGERY

## 2025-04-15 RX ORDER — MESALAMINE 1.2 G/1
1.2 TABLET, DELAYED RELEASE ORAL
Qty: 30 TABLET | Refills: 1 | Status: SHIPPED | OUTPATIENT
Start: 2025-04-15 | End: 2026-04-15

## 2025-04-15 RX ORDER — PROPOFOL 10 MG/ML
VIAL (ML) INTRAVENOUS
Status: DISCONTINUED | OUTPATIENT
Start: 2025-04-15 | End: 2025-04-15

## 2025-04-15 RX ORDER — BUDESONIDE 9 MG/1
9 TABLET, FILM COATED, EXTENDED RELEASE ORAL DAILY
Qty: 30 TABLET | Refills: 2 | Status: SHIPPED | OUTPATIENT
Start: 2025-04-15

## 2025-04-15 RX ORDER — SODIUM CHLORIDE 9 MG/ML
INJECTION, SOLUTION INTRAVENOUS CONTINUOUS
Status: DISCONTINUED | OUTPATIENT
Start: 2025-04-15 | End: 2025-04-15 | Stop reason: HOSPADM

## 2025-04-15 RX ORDER — LIDOCAINE HYDROCHLORIDE 20 MG/ML
INJECTION INTRAVENOUS
Status: DISCONTINUED | OUTPATIENT
Start: 2025-04-15 | End: 2025-04-15

## 2025-04-15 RX ORDER — PROPOFOL 10 MG/ML
VIAL (ML) INTRAVENOUS CONTINUOUS PRN
Status: DISCONTINUED | OUTPATIENT
Start: 2025-04-15 | End: 2025-04-15

## 2025-04-15 RX ADMIN — LIDOCAINE HYDROCHLORIDE 100 MG: 20 INJECTION INTRAVENOUS at 09:04

## 2025-04-15 RX ADMIN — PROPOFOL 150 MCG/KG/MIN: 10 INJECTION, EMULSION INTRAVENOUS at 09:04

## 2025-04-15 RX ADMIN — SODIUM CHLORIDE: 0.9 INJECTION, SOLUTION INTRAVENOUS at 08:04

## 2025-04-15 RX ADMIN — GLYCOPYRROLATE 0.1 MG: 0.2 INJECTION, SOLUTION INTRAMUSCULAR; INTRAVENOUS at 09:04

## 2025-04-15 RX ADMIN — PROPOFOL 80 MG: 10 INJECTION, EMULSION INTRAVENOUS at 09:04

## 2025-04-15 NOTE — TRANSFER OF CARE
"Anesthesia Transfer of Care Note    Patient: Marika Lindsey    Procedure(s) Performed: Procedure(s) (LRB):  COLONOSCOPY (N/A)    Patient location: PACU    Anesthesia Type: general    Transport from OR: Transported from OR on 6-10 L/min O2 by face mask with adequate spontaneous ventilation    Post pain: adequate analgesia    Post assessment: no apparent anesthetic complications and tolerated procedure well    Post vital signs: stable    Level of consciousness: responds to stimulation and sedated    Nausea/Vomiting: no nausea/vomiting    Complications: none    Transfer of care protocol was followed    Last vitals: Visit Vitals  /81   Pulse 80   Temp 36.4 °C (97.6 °F)   Resp 16   Ht 5' 4" (1.626 m)   Wt 122.5 kg (270 lb)   SpO2 95%   Breastfeeding No   BMI 46.35 kg/m²     "

## 2025-04-15 NOTE — PROVATION PATIENT INSTRUCTIONS
Discharge Summary/Instructions after an Endoscopic Procedure  Patient Name: Marika Lindsey  Patient MRN: [id number]  Patient YOB: 1970  Tuesday, April 15, 2025  Adelina Garces MD  Dear patient,  As a result of recent federal legislation (The Federal Cures Act), you may   receive lab or pathology results from your procedure in your [facility]   account before your physician is able to contact you. Your physician or   their representative will relay the results to you with their   recommendations at their soonest availability.  Thank you,  RESTRICTIONS:  During your procedure today, you received medications for sedation.  These   medications may affect your judgment, balance and coordination.  Therefore,   for 24 hours, you have the following restrictions:   - DO NOT drive a car, operate machinery, make legal/financial decisions,   sign important papers or drink alcohol.    ACTIVITY:  Today: no heavy lifting, straining or running due to procedural   sedation/anesthesia.  The following day: return to full activity including work.  DIET:  Eat and drink normally unless instructed otherwise.     TREATMENT FOR COMMON SIDE EFFECTS:  - Mild abdominal pain, nausea, belching, bloating or excessive gas:  rest,   eat lightly and use a heating pad.  - Sore Throat: treat with throat lozenges and/or gargle with warm salt   water.  - Because air was used during the procedure, expelling large amounts of air   from your rectum or belching is normal.  - If a bowel prep was taken, you may not have a bowel movement for 1-3 days.    This is normal.  SYMPTOMS TO WATCH FOR AND REPORT TO YOUR PHYSICIAN:  1. Abdominal pain or bloating, other than gas cramps.  2. Chest pain.  3. Back pain.  4. Signs of infection such as: chills or fever occurring within 24 hours   after the procedure.  5. Rectal bleeding, which would show as bright red, maroon, or black stools.   (A tablespoon of blood from the rectum is not serious, especially if    hemorrhoids are present.)  6. Vomiting.  7. Weakness or dizziness.  GO DIRECTLY TO THE NEAREST EMERGENCY ROOM IF YOU HAVE ANY OF THE FOLLOWING:      Difficulty breathing              Chills and/or fever over 101 F   Persistent vomiting and/or vomiting blood   Severe abdominal pain   Severe chest pain   Black, tarry stools   Bleeding- more than one tablespoon   Any other symptom or condition that you feel may need urgent attention  Your doctor recommends these additional instructions:  If any biopsies were taken, your doctors clinic will contact you in 1 to 2   weeks with any results.  - Patient has a contact number available for emergencies.  The signs and   symptoms of potential delayed complications were discussed with the   patient.  Return to normal activities tomorrow.  Written discharge   instructions were provided to the patient.   - Resume previous diet.   - Continue present medications.   - Discharge patient to home (ambulatory).   - Repeat colonoscopy in 1 year for surveillance.  For questions, problems or results please call your physician - Adelina Garces MD at Work:  [phone].  [facility], EMERGENCY ROOM PHONE NUMBER: [phone]  IF A COMPLICATION OR EMERGENCY SITUATION ARISES AND YOU ARE UNABLE TO REACH   YOUR PHYSICIAN - GO DIRECTLY TO THE EMERGENCY ROOM.  MD Adelina Grimes MD  4/15/2025 10:10:02 AM  This report has been verified and signed electronically.  Dear patient,  As a result of recent federal legislation (The Federal Cures Act), you may   receive lab or pathology results from your procedure in your [facility]   account before your physician is able to contact you. Your physician or   their representative will relay the results to you with their   recommendations at their soonest availability.  Thank you,  PROVATION

## 2025-04-15 NOTE — H&P
COLONOSCOPY HISTORY AND PHYSICAL EXAM    Procedure : Colonoscopy      INDICATIONS: rectal bleeding    Family Hx of CRC: none    Last Colonoscopy:    Findings: normal        Past Medical History:   Diagnosis Date    Dehisced gastrointestinal anastomosis     Diverticulitis     GERD (gastroesophageal reflux disease)     Thyroid disease     Wound dehiscence      Sedation Problems: NO  No family history on file.  Fam Hx of Sedation Problems: NO  Social History[1]    Review of Systems - Negative except   Respiratory ROS: no dyspnea  Cardiovascular ROS: no exertional chest pain  Gastrointestinal ROS: NO abdominal discomfort,  NO rectal bleeding  Musculoskeletal ROS: no muscular pain  Neurological ROS: no recent stroke    Physical Exam:  There were no vitals taken for this visit.  General: no distress  Head: normocephalic  Mallampati Score   Neck: supple, symmetrical, trachea midline  Lungs:  clear to auscultation bilaterally and normal respiratory effort  Heart: regular rate and rhythm and no murmur  Abdomen: soft, non-tender non-distented; bowel sounds normal; no masses,  no organomegaly  Extremities: no cyanosis or edema, or clubbing    ASA:  III    PLAN  COLONOSCOPY.    SedationPlan :MAC    The details of the procedure, the possible need for biopsy or polypectomy and the potential risks including bleeding, perforation, missed polyps were discussed in detail.    Adelina Garces MD, FACS, FASCRS  Staff Surgeon   Colon & Rectal Surgery             [1]   Social History  Socioeconomic History    Marital status: Single   Tobacco Use    Smoking status: Former     Current packs/day: 0.00     Types: Cigarettes     Quit date: 10/19/2006     Years since quittin.5    Smokeless tobacco: Never   Substance and Sexual Activity    Alcohol use: Not Currently     Comment: rare    Drug use: Not Currently    Sexual activity: Not Currently     Social Drivers of Health     Financial Resource Strain: Medium Risk (3/18/2025)    Overall  Financial Resource Strain (CARDIA)     Difficulty of Paying Living Expenses: Somewhat hard   Food Insecurity: Food Insecurity Present (3/18/2025)    Hunger Vital Sign     Worried About Running Out of Food in the Last Year: Sometimes true     Ran Out of Food in the Last Year: Never true   Transportation Needs: No Transportation Needs (3/18/2025)    PRAPARE - Transportation     Lack of Transportation (Medical): No     Lack of Transportation (Non-Medical): No   Physical Activity: Sufficiently Active (3/18/2025)    Exercise Vital Sign     Days of Exercise per Week: 4 days     Minutes of Exercise per Session: 50 min   Stress: Stress Concern Present (3/18/2025)    [employer] - Occupational Stress Questionnaire     Feeling of Stress : To some extent   Housing Stability: Low Risk  (3/18/2025)    Housing Stability Vital Sign     Unable to Pay for Housing in the Last Year: No     Number of Times Moved in the Last Year: 0     Homeless in the Last Year: No

## 2025-04-15 NOTE — PLAN OF CARE
Chart reviewed. Preop nursing care completed per orders. Safe surgery checklist complete. Pt denies any open wounds cuts or sores. Pt denies any metal in body or use of weight loss injections. Pt AAOX3, VSS on room air. Pt toileted, Bed locked in lowest position, Call light within reach. Pt denies any needs at this time. Belongings placed in locker #7.

## 2025-04-15 NOTE — ANESTHESIA POSTPROCEDURE EVALUATION
Anesthesia Post Evaluation    Patient: Marika Lindsey    Procedure(s) Performed: Procedure(s) (LRB):  COLONOSCOPY (N/A)    Final Anesthesia Type: general      Patient location during evaluation: PACU  Patient participation: Yes- Able to Participate  Level of consciousness: awake and alert  Post-procedure vital signs: reviewed and stable  Pain management: adequate  Airway patency: patent    PONV status at discharge: No PONV  Anesthetic complications: no      Cardiovascular status: blood pressure returned to baseline  Respiratory status: unassisted  Hydration status: euvolemic  Follow-up not needed.          Vitals Value Taken Time   /69 04/15/25 10:30   Temp 36.5 °C (97.7 °F) 04/15/25 10:11   Pulse 87 04/15/25 10:30   Resp 24 04/15/25 10:30   SpO2 96 % 04/15/25 10:30   Vitals shown include unfiled device data.      Event Time   Out of Recovery 10:30:00         Pain/Celio Score: Celio Score: 10 (4/15/2025 10:15 AM)

## 2025-04-21 LAB
ESTROGEN SERPL-MCNC: NORMAL PG/ML
INSULIN SERPL-ACNC: NORMAL U[IU]/ML
LAB AP CLINICAL INFORMATION: NORMAL
LAB AP GROSS DESCRIPTION: NORMAL
LAB AP PERFORMING LOCATION(S): NORMAL
LAB AP REPORT FOOTNOTES: NORMAL
T3RU NFR SERPL: NORMAL %

## 2025-04-24 ENCOUNTER — PATIENT MESSAGE (OUTPATIENT)
Dept: SURGERY | Facility: CLINIC | Age: 55
End: 2025-04-24
Payer: MEDICAID

## 2025-04-28 ENCOUNTER — TELEPHONE (OUTPATIENT)
Dept: GASTROENTEROLOGY | Facility: CLINIC | Age: 55
End: 2025-04-28
Payer: MEDICAID

## 2025-04-28 NOTE — TELEPHONE ENCOUNTER
----- Message from Celine sent at 4/28/2025 11:07 AM CDT -----  Regarding: Returning a Missed Call  Name Of Caller:   Heidijuanjose Preference:   [phone] of call:    Pt returning missed call from Keyanna. She needs to schedule Np appt from a referral.

## 2025-04-28 NOTE — TELEPHONE ENCOUNTER
----- Message from Adelina Garces MD sent at 4/15/2025 11:53 AM CDT -----  Inflammatory Bowel Disease Referrals IBD HistoryDiagnosis (Ulcerative colitis or Crohn's disease): UCCurrent MedicationsStarted on budesonide and mesalamine after colonoscopy  Other Pertinent Information:History of perforated diverticulitis with end colostomy.  Closure of colostomy in 2022.  More recently with tenesmus and bloody bowel movements.  Scope 4/15/25 with proctocolitis.  Fecal calprotectin > 800.   Timeframe appointment needed:1-2 months PLEASE SEND REFERRALS TO:P [facility] INFLAMMATORY BOWEL DISEASE (CROHN'S AND COLITIS) REFERRALS

## 2025-04-30 ENCOUNTER — TELEPHONE (OUTPATIENT)
Dept: BARIATRICS | Facility: CLINIC | Age: 55
End: 2025-04-30
Payer: MEDICAID

## 2025-05-01 ENCOUNTER — TELEPHONE (OUTPATIENT)
Dept: BARIATRICS | Facility: CLINIC | Age: 55
End: 2025-05-01
Payer: MEDICAID

## 2025-05-14 ENCOUNTER — LAB VISIT (OUTPATIENT)
Dept: LAB | Facility: HOSPITAL | Age: 55
End: 2025-05-14
Attending: INTERNAL MEDICINE
Payer: MEDICAID

## 2025-05-14 ENCOUNTER — TELEPHONE (OUTPATIENT)
Dept: GASTROENTEROLOGY | Facility: CLINIC | Age: 55
End: 2025-05-14

## 2025-05-14 ENCOUNTER — OFFICE VISIT (OUTPATIENT)
Dept: GASTROENTEROLOGY | Facility: CLINIC | Age: 55
End: 2025-05-14
Payer: MEDICAID

## 2025-05-14 VITALS
OXYGEN SATURATION: 93 % | DIASTOLIC BLOOD PRESSURE: 82 MMHG | HEART RATE: 78 BPM | BODY MASS INDEX: 49.01 KG/M2 | SYSTOLIC BLOOD PRESSURE: 125 MMHG | HEIGHT: 64 IN | WEIGHT: 287.06 LBS | TEMPERATURE: 98 F

## 2025-05-14 DIAGNOSIS — K51.00 ULCERATIVE PANCOLITIS WITHOUT COMPLICATION: ICD-10-CM

## 2025-05-14 DIAGNOSIS — E66.01 SEVERE OBESITY (BMI >= 40): ICD-10-CM

## 2025-05-14 DIAGNOSIS — K51.00 ULCERATIVE PANCOLITIS WITHOUT COMPLICATION: Primary | ICD-10-CM

## 2025-05-14 DIAGNOSIS — G47.33 OSA (OBSTRUCTIVE SLEEP APNEA): ICD-10-CM

## 2025-05-14 LAB
25(OH)D3+25(OH)D2 SERPL-MCNC: 14 NG/ML (ref 30–96)
ABSOLUTE EOSINOPHIL (OHS): 0.35 K/UL
ABSOLUTE MONOCYTE (OHS): 0.53 K/UL (ref 0.3–1)
ABSOLUTE NEUTROPHIL COUNT (OHS): 1.83 K/UL (ref 1.8–7.7)
ALBUMIN SERPL BCP-MCNC: 4.1 G/DL (ref 3.5–5.2)
ALP SERPL-CCNC: 59 UNIT/L (ref 40–150)
ALT SERPL W/O P-5'-P-CCNC: 23 UNIT/L (ref 10–44)
ANION GAP (OHS): 7 MMOL/L (ref 8–16)
AST SERPL-CCNC: 22 UNIT/L (ref 11–45)
BASOPHILS # BLD AUTO: 0.03 K/UL
BASOPHILS NFR BLD AUTO: 0.6 %
BILIRUB SERPL-MCNC: 0.6 MG/DL (ref 0.1–1)
BUN SERPL-MCNC: 13 MG/DL (ref 6–20)
CALCIUM SERPL-MCNC: 9.3 MG/DL (ref 8.7–10.5)
CHLORIDE SERPL-SCNC: 102 MMOL/L (ref 95–110)
CO2 SERPL-SCNC: 25 MMOL/L (ref 23–29)
CREAT SERPL-MCNC: 0.8 MG/DL (ref 0.5–1.4)
CRP SERPL-MCNC: 3.9 MG/L
ERYTHROCYTE [DISTWIDTH] IN BLOOD BY AUTOMATED COUNT: 13 % (ref 11.5–14.5)
GFR SERPLBLD CREATININE-BSD FMLA CKD-EPI: >60 ML/MIN/1.73/M2
GLUCOSE SERPL-MCNC: 92 MG/DL (ref 70–110)
HAV AB SER QL IA: NORMAL
HBV CORE AB SERPL QL IA: NORMAL
HBV SURFACE AG SERPL QL IA: NORMAL
HCT VFR BLD AUTO: 42.3 % (ref 37–48.5)
HCV AB SERPL QL IA: NORMAL
HGB BLD-MCNC: 14.1 GM/DL (ref 12–16)
IMM GRANULOCYTES # BLD AUTO: 0.01 K/UL (ref 0–0.04)
IMM GRANULOCYTES NFR BLD AUTO: 0.2 % (ref 0–0.5)
LYMPHOCYTES # BLD AUTO: 2.69 K/UL (ref 1–4.8)
MCH RBC QN AUTO: 31.2 PG (ref 27–31)
MCHC RBC AUTO-ENTMCNC: 33.3 G/DL (ref 32–36)
MCV RBC AUTO: 94 FL (ref 82–98)
MUMPS IGG (OHS): <5 AU/ML
MUMPS IGG INTERPRETATION (OHS): NEGATIVE
NUCLEATED RBC (/100WBC) (OHS): 0 /100 WBC
PLATELET # BLD AUTO: 258 K/UL (ref 150–450)
PMV BLD AUTO: 10.1 FL (ref 9.2–12.9)
POTASSIUM SERPL-SCNC: 4.2 MMOL/L (ref 3.5–5.1)
PROT SERPL-MCNC: 7.3 GM/DL (ref 6–8.4)
RBC # BLD AUTO: 4.52 M/UL (ref 4–5.4)
RELATIVE EOSINOPHIL (OHS): 6.4 %
RELATIVE LYMPHOCYTE (OHS): 49.4 % (ref 18–48)
RELATIVE MONOCYTE (OHS): 9.7 % (ref 4–15)
RELATIVE NEUTROPHIL (OHS): 33.7 % (ref 38–73)
RUBEOLA (MEASLES) IGG (OHS): 127 AU/ML
RUBEOLA IGG INTERP. (OHS): POSITIVE
SODIUM SERPL-SCNC: 134 MMOL/L (ref 136–145)
V.ZOSTER IGG INTERP (OHS): POSITIVE
VARICELLA ZOSTER IGG (OHS): 35.5 S/CO
VIT B12 SERPL-MCNC: 607 PG/ML (ref 210–950)
WBC # BLD AUTO: 5.44 K/UL (ref 3.9–12.7)

## 2025-05-14 PROCEDURE — 85025 COMPLETE CBC W/AUTO DIFF WBC: CPT

## 2025-05-14 PROCEDURE — 86787 VARICELLA-ZOSTER ANTIBODY: CPT

## 2025-05-14 PROCEDURE — 86480 TB TEST CELL IMMUN MEASURE: CPT

## 2025-05-14 PROCEDURE — 99999 PR PBB SHADOW E&M-EST. PATIENT-LVL III: CPT | Mod: PBBFAC,,, | Performed by: INTERNAL MEDICINE

## 2025-05-14 PROCEDURE — 86803 HEPATITIS C AB TEST: CPT

## 2025-05-14 PROCEDURE — 86706 HEP B SURFACE ANTIBODY: CPT

## 2025-05-14 PROCEDURE — 86762 RUBELLA ANTIBODY: CPT

## 2025-05-14 PROCEDURE — 36415 COLL VENOUS BLD VENIPUNCTURE: CPT

## 2025-05-14 PROCEDURE — 86765 RUBEOLA ANTIBODY: CPT

## 2025-05-14 PROCEDURE — 86790 VIRUS ANTIBODY NOS: CPT

## 2025-05-14 PROCEDURE — 86140 C-REACTIVE PROTEIN: CPT

## 2025-05-14 PROCEDURE — 86735 MUMPS ANTIBODY: CPT

## 2025-05-14 PROCEDURE — 86704 HEP B CORE ANTIBODY TOTAL: CPT

## 2025-05-14 PROCEDURE — 82306 VITAMIN D 25 HYDROXY: CPT

## 2025-05-14 PROCEDURE — 87340 HEPATITIS B SURFACE AG IA: CPT

## 2025-05-14 PROCEDURE — 80053 COMPREHEN METABOLIC PANEL: CPT

## 2025-05-14 PROCEDURE — 99213 OFFICE O/P EST LOW 20 MIN: CPT | Mod: PBBFAC | Performed by: INTERNAL MEDICINE

## 2025-05-14 PROCEDURE — 82607 VITAMIN B-12: CPT

## 2025-05-14 RX ORDER — MESALAMINE 1.2 G/1
2.4 TABLET, DELAYED RELEASE ORAL
Qty: 60 TABLET | Refills: 5 | Status: SHIPPED | OUTPATIENT
Start: 2025-05-14 | End: 2026-05-14

## 2025-05-14 NOTE — PROGRESS NOTES
I spent 35 minutes on 5/13/25 reviewing Marika Lindsey medical records prior to clinic visit on 5/14/25.

## 2025-05-14 NOTE — PATIENT INSTRUCTIONS
Go down on budesonide to one pill every other day for 2 weeks  Restart Lialda 2.4 g daily  Labs today  Submit stool sample in mid July  Further plans based on results  Follow up in 6 months

## 2025-05-14 NOTE — PROGRESS NOTES
[facility]          Inflammatory Bowel Disease New Patient Consultation Note         TODAY'S VISIT DATE:  5/14/2025    Reason for Consult:    Chief Complaint   Patient presents with    NP       PCP: Vijay Olivas      Referring MD:   Dr. Adelina Garces    History of Present Illness:  Marika Lindsey who is a 54 y.o. female is being seen today at the [facility] on 05/14/2025 for inflammatory bowel disease- ulcerative colitis.  Recently diagnosed with ulcerative colitis.  Her history is noted below.  Today she reports that she is actually doing quite well.  Following her colonoscopy in April she was started on Uceris 9 mg daily.  She took this up until a few days ago and reports that this returned her bowels back to normal.  She also took Lialda 1.2 g daily for about a month but has not been able to get her prescription refilled since then.  She also felt like this helped significantly.  Currently she is having 2 bowel movements a day.  They are formed.  There is no blood in his stools.  She denies any family history of IBD or colorectal cancer.  She denies any abdominal pain or urgency.  She denies tobacco use or NSAID use.  Overall she is feeling quite well from a bowel standpoint.  She does have complaints about a ventral hernia and has seen surgery who recommended weight loss.  Unfortunately she was not able to see the bariatric [facility] medicine clinic because of insurance reasons.  She does have a history of sleep apnea as well.  I encouraged her to establish with a new primary care physician as they may be able to help obtaining weight loss medications given her comorbidities.    IBD History:  She was diagnosed with ulcerative colitis in April of 2025.  A review of her records demonstrated that she had hyperplastic polyps in April of 2021 but no evidence of inflammation in her colon.  She also had diverticulosis at that time.  In  January of 2022 she presented to [facility] with severe abdominal pain and was found to have acute perforated diverticulitis with purulent peritonitis.  She underwent an exploratory laparotomy, lysis of adhesions, drainage of an intra-abdominal abscess, sigmoid colectomy with end colostomy placement and due to wound healing issues required a wound VAC. she had issues with fascial dehiscence and had a return trip to the operating room in February of 2022.  Eventually things healed up.  She underwent a colonoscopy March 25, 2022 and was found to have a few small mouth diverticula in the sigmoid colon but otherwise the colon was normal-appearing.  She had a proctoscopy at that time as well that showed some minimal inflammatory changes in the rectum.  In May of 2022 she presented for takedown of her colostomy.  This happened June 14, 2022.  Her postop course was complicated by poor wound healing and development of an abscess/fluid collection in the abdominal wall that has healed.  This did require drainage by Interventional Radiology and a repeat operation for management.  During the ongoing evaluation of this fluid collection she had intermittent episodes of rectal bleeding and urgency and diarrhea.  On a CT scan in June of 2023 she was noted to have circumferential wall thickening of the distal rectum.  This was treated with hydrocortisone suppositories with good results.  A follow up CT scan in October of 2023 showed improvement in the inflammatory changes in the rectum.  In December of 2023 another CT scan was performed that again showed inflammatory changes in the rectum.  She underwent a repeat operation on January 9, 2024 which was debridement of the abscess cavity and incision and drainage of the abdominal wall abscess.  She was treated with antibiotics for the proctitis issues and in March of 2024 a follow-up sigmoidoscopy did not show any inflammation in the rectum.  She did well other than development a  ventral hernia for which she saw General surgery.  Surgery was deferred in hopes for weight loss to improve surgical outcomes.  She was referred to Bariatric Medicine but was declined by her insurance.  In March of 2025 she had a follow up with Colorectal surgery and again reported fecal urgency and blood in his stools.  A stool calprotectin level at that time was greater than 800.  A repeat colonoscopy was performed April 15, 2025 and was notable for moderate inflammatory changes extending from the rectum to the cecum without any skip areas.  Ileum was normal-appearing.  Biopsies were consistent with chronic active inflammation in the colon.  She was started on Uceris 9 mg daily and Lialda 1.2 g daily.  She noted rapid improvement in her symptoms following initiation of these medications.    IBD Details:  Dx Date:  April 2025  Disease type/distribution:Ulcerative colitis/pancolitis  Current Treatment:  Uceris 9 mg daily Start Date:  April 2025 Response:  Symptom improvement Optimized:  Not applicable  Adverse reactions:  None  Prior surgeries:   Sigmoid colectomy with end-colostomy in 2022 due to perforated diverticulitis     Colostomy takedown in June of 2022     Incision and drainage and debridement of abdominal abscess January 9, 2024  CRP Elevation:  Unknown   calprotectin: Elevated with active disease  Disease Complications:  None  Extraintestinal manifestations:  None  Prior treatments:   Steroids:  Good response to Uceris  5ASA:  Unclear response to Lialda due to suboptimal dosing  IMM:  None  TNF Inh:  None   Anti-Integrin:  None   IL 12/23:  None  JOSE L Inh:  None    Previous Clinical Trials:  None    Last Colonoscopy:  April 15, 2025-moderate inflammation extending from the rectum to the cecum, normal terminal ileum    Other Endoscopies:  None    Imaging:   MRE:  None   CT:  Multiple CT scans reviewed   Other:  None    Pertinent Labs:  Lab Results   Component Value Date    SEDRATE 26 10/08/2022    CRP 3.8  "03/24/2025     No results found for: "TTGIGA", "IGA"  Lab Results   Component Value Date    TSH 2.736 10/21/2024    FREET4 0.90 10/21/2024     No results found for: "ICIHYHYL35AB", "NEWOIEFI49"  Lab Results   Component Value Date    HEPCAB Non-reactive 10/08/2022     Lab Results   Component Value Date    KOS48FCIJ Non-reactive 10/08/2022     No results found for: "NIL", "TBAG", "TBAGNIL", "MITOGENNIL", "TBGOLD", "TSPOTSCREN"  No results found for: "TPTMINTERP", "TPMTRESULT"  Lab Results   Component Value Date    STOOLCULTURE  01/31/2024     No Salmonella,Shigella,Vibrio,Campylobacter,Yersinia isolated.    FWODVHSKZA0R Negative 01/31/2024    RYTHKDYRKF9D Negative 01/31/2024    CDIFFICILEAN Negative 01/31/2024    CDIFFTOX Negative 01/31/2024     Lab Results   Component Value Date    CALPROTECTIN >800 04/08/2025       Therapeutic Drug Monitoring Labs:  No results found for: "PROMETH"  No results found for: "ANSADAINIT", "INFLIXIMAB", "INFLIXINTERP"    Vaccinations:  No results found for: "HEPBSAB"  No results found for: "HEPAIGG"  No results found for: "VARICELLAZOS", "VARICELLAINT"  Immunization History   Administered Date(s) Administered    COVID-19 MRNA, LN-S PF (MODERNA HALF 0.25 ML DOSE) 12/08/2021    COVID-19, MRNA, LN-S, PF (MODERNA FULL 0.5 ML DOSE) 04/02/2021, 05/01/2021    Influenza A (H1N1) 2009 Monovalent - IM 01/29/2010         Review of Systems  Review of Systems   Constitutional:  Negative for chills, fever and weight loss.   HENT:  Negative for sore throat.    Eyes:  Negative for pain, discharge and redness.   Respiratory:  Negative for cough, shortness of breath and wheezing.    Cardiovascular:  Negative for chest pain, orthopnea and leg swelling.   Gastrointestinal:  Negative for abdominal pain, blood in stool, constipation, diarrhea, heartburn, melena, nausea and vomiting.   Genitourinary:  Negative for dysuria, frequency and urgency.   Musculoskeletal:  Negative for back pain, joint pain and " myalgias.   Skin:  Negative for itching and rash.   Neurological:  Negative for focal weakness and seizures.   Endo/Heme/Allergies:  Does not bruise/bleed easily.   Psychiatric/Behavioral:  Negative for depression. The patient is not nervous/anxious.        Medical History:   Past Medical History:   Diagnosis Date    Abdominal hernia     Dehisced gastrointestinal anastomosis     Diverticulitis     GERD (gastroesophageal reflux disease)     Thyroid disease     Wound dehiscence        Surgical History:  Past Surgical History:   Procedure Laterality Date    ABSCESS DRAINAGE      COLON SURGERY  01/2022    sigmoid colon removed    COLONOSCOPY  2021    COLONOSCOPY  2022    COLONOSCOPY N/A 04/15/2025    Procedure: COLONOSCOPY;  Surgeon: Adelina Garces MD;  Location: [facility] ENDOSCOPY;  Service: Endoscopy;  Laterality: N/A;  4/1-virgil-miralax-portal-tb  4.9 pt rescheduled to sooner appt 4.15; pt aware of new date/time; isntr resent via portal; AP    COLOSTOMY      DEBRIDEMENT, ABDOMEN N/A 01/09/2024    Procedure: DEBRIDEMENT, ABDOMEN;  Surgeon: Adelina Garces MD;  Location: [facility] OR;  Service: Colon and Rectal;  Laterality: N/A;  ABDOMENAL WALL  WOUND    INCISION AND DRAINAGE OF ABDOMEN  01/09/2024    Procedure: INCISION AND DRAINAGE, ABDOMEN;  Surgeon: Adelina Garces MD;  Location: [facility] OR;  Service: Colon and Rectal;;  incision and drainage of abdominal wall abcess    ROBOTIC CLOSURE, COLOSTOMY N/A 06/14/2022    Procedure: ROBOTIC CLOSURE, COLOSTOMY ;  Surgeon: Adelina Garces MD;  Location: [facility] OR;  Service: Colon and Rectal;  Laterality: N/A;  W/ FLEXIBLE SIGMOIDOSCOPY   LITHOTOMY EEA STAPLER       URETHRA SURGERY         Family History:   Family History   Problem Relation Name Age of Onset    Other (Heart defect,Diverticulitis) Mother      Other (Heart defect,Rheumatoid arthritis) Father      No Known Problems Brother         Social History:   Social History[1]    Allergies: Reviewed    Home Medications:   Medication List  "with Changes/Refills   Current Medications    BUDESONIDE (UCERIS) 9 MG TADE    Take 1 tablet (9 mg total) by mouth once daily.    LEVOTHYROXINE (SYNTHROID) 50 MCG TABLET    Take 1 tablet (50 mcg total) by mouth before breakfast.   Changed and/or Refilled Medications    Modified Medication Previous Medication    MESALAMINE (LIALDA) 1.2 GRAM TBEC mesalamine (LIALDA) 1.2 gram TbEC       Take 2 tablets (2.4 g total) by mouth daily with breakfast.    Take 1 tablet (1.2 g total) by mouth daily with breakfast.       Physical Exam:  Vital Signs:  /82 (BP Location: Right forearm, Patient Position: Sitting)   Pulse 78   Temp 98.2 °F (36.8 °C) (Skin)   Ht 5' 4" (1.626 m)   Wt 130.2 kg (287 lb 0.6 oz)   SpO2 (!) 93%   BMI 49.27 kg/m²   Body mass index is 49.27 kg/m².    Physical Exam  Vitals and nursing note reviewed.   Constitutional:       General: She is not in acute distress.     Appearance: Normal appearance. She is well-developed. She is obese. She is not ill-appearing or toxic-appearing.   Eyes:      Conjunctiva/sclera: Conjunctivae normal.      Pupils: Pupils are equal, round, and reactive to light.   Neck:      Thyroid: No thyromegaly.   Cardiovascular:      Rate and Rhythm: Normal rate and regular rhythm.      Heart sounds: Normal heart sounds. No murmur heard.  Pulmonary:      Effort: Pulmonary effort is normal.      Breath sounds: Normal breath sounds. No wheezing or rales.   Abdominal:      General: Bowel sounds are normal. There is no distension.      Palpations: Abdomen is soft. There is no mass.      Tenderness: There is no abdominal tenderness.      Hernia: A hernia is present.   Musculoskeletal:         General: No tenderness. Normal range of motion.   Lymphadenopathy:      Cervical: No cervical adenopathy.   Skin:     Findings: No erythema or rash.   Neurological:      General: No focal deficit present.      Mental Status: She is alert and oriented to person, place, and time.   Psychiatric:        "  Mood and Affect: Mood normal.         Behavior: Behavior normal.         Thought Content: Thought content normal.         Judgment: Judgment normal.         Labs: reviewed and pertinent noted above    Assessment/Plan:  Marika Lindsey is a 54 y.o. female with ulcerative pan colitis. The following issues were addresssed:    1. Ulcerative pancolitis without complication    2. TULIO (obstructive sleep apnea)    3. Severe obesity (BMI >= 40)      1. Ulcerative colitis: Overall her symptoms and findings on colonoscopy suggest more mild-to-moderate disease activity.  It sounds like she has had an excellent response to Uceris and Lialda.  We will send in a refill for Lialda 2.4 g daily and discuss that this is the typical starting dose.  If needed we can go up to 4.8 g daily in the future.  We will plan to continue work on tapering off of the Uceris.  She will  her prescription and take this once every other day for 2 weeks and then try to stop it.  Once she has been on the Lialda consistently for 8 weeks we will plan for a follow-up calprotectin level in mid July.  If there still evidence of significant inflammation or if she is having active symptoms at that time we will plan to increase the dose.  We had a long discussion today about the underlying cause of ulcerative colitis and long-term complications, specifically colon cancer risk.  We discussed need for regular monitoring to keep things under control and discuss that this is a chronic condition that is likely going to require lifelong therapy.  We briefly discussed other treatment options today as well.    2. Obesity/sleep apnea: Recommend establishing with a new primary care physician as her previous primary care physician is no longer covered by her insurance.  Hopefully with a diagnosis of sleep apnea and obesity she may be able to qualify for weight loss medications.  We discussed that these medication should not have a negative impact on  ulcerative colitis.    Ex smoker:  - recommended to continue smoking cessation  - discussed in detail that Crohn's disease can worsen with smoking and may make patient more resistant to treatment    # IBD specific health maintenance:  Colon cancer surveillance:  Up-to-date    Annual:  - Eye exam:  Not applicable  - Skin exam (if on IMM/TNF):  Recommend annual skin exam  - reminded pt to use sunblock/hats/sunprotective clothing  - PAP (if immunosuppressed):  Recommend annual pelvic exam    DEXA:  Defer to primary care    Vitamin D:  Check today    Vaccines:    Influenza:  Recommend annual flu shot   Pneumovax:  Review in the future   HAV:  Check serologies   HBV:  Check serologies   Tdap:  Discuss in the future   MMR:  Check serologies   VZV:  Check serologies   HZV:  Completed series   HPV:  Not applicable   Meningococcus:  Not applicable    Follow up: Follow up in about 6 months (around 2025).    Thank you again for sending Marikamarbin Lindsey to see Dr. Jay Paz today at the [facility]. Please don't hesitate to contact Dr. Paz if there are any questions regarding this evaluation, or if you have any other patients with inflammatory bowel disease for whom you would like a consultation. You can reach Dr. Paz at [phone] or by email at [email or web address]    Derik Paz MD  Department of Gastroenterology  Inflammatory Bowel Disease               [1]   Social History  Tobacco Use    Smoking status: Former     Current packs/day: 0.00     Types: Cigarettes     Quit date: 10/19/2006     Years since quittin.5    Smokeless tobacco: Never   Substance Use Topics    Alcohol use: Not Currently     Comment: rare    Drug use: Not Currently

## 2025-05-14 NOTE — TELEPHONE ENCOUNTER
----- Message from Angelina sent at 5/14/2025  9:49 AM CDT -----  Regarding: Late Arrival  Contact: Marika  CONSULT/ADVISORYName of Caller: Marika Contact Preference: [phone] (home) Nature of Call: Pt  WANTED TO Inform you all that she is running late to her appt but she is on her way and will be there

## 2025-05-15 ENCOUNTER — RESULTS FOLLOW-UP (OUTPATIENT)
Dept: GASTROENTEROLOGY | Facility: HOSPITAL | Age: 55
End: 2025-05-15

## 2025-05-15 LAB
MITOGEN MINUS NIL (OHS): 9.97
NIL TB SYNCED (OHS): 0.03
QUANTIFERON GOLD INTERP (OHS): NEGATIVE
RUBV IGG SER-ACNC: 10.5 IU/ML
RUBV IGG SER-IMP: REACTIVE
TB1 AG MINUS NIL (OHS): 0.1
TB2 AG MINUS NIL (OHS): 0.07

## 2025-05-17 LAB
W HEPATITIS B SURFACE ANTIBODY, QUALITATIVE: POSITIVE
W HEPATITIS B SURFACE ANTIBODY, QUANTITATIVE: 468 MIU/ML

## (undated) DEVICE — SOL POVIDONE SCRUB IODINE 4 OZ

## (undated) DEVICE — STAPLER SKIN PROXIMATE WIDE

## (undated) DEVICE — DRAPE COLUMN DAVINCI XI

## (undated) DEVICE — SET TRI-LUMEN FILTERED TUBE

## (undated) DEVICE — ELECTRODE REM PLYHSV RETURN 9

## (undated) DEVICE — SUT PROLENE 2-0 SH 36IN BLU

## (undated) DEVICE — GLOVE SENSICARE PI GRN 6.5

## (undated) DEVICE — SUT 3-0 12-18IN SILK

## (undated) DEVICE — SUT VICRYL PLUS 3-0 SH 18IN

## (undated) DEVICE — SOL ELECTROLUBE ANTI-STIC

## (undated) DEVICE — CANNULA SEAL 12MM

## (undated) DEVICE — STAPLER SUREFORM SGL USE 45

## (undated) DEVICE — GRASPER EPIX 5X20MM 45CM

## (undated) DEVICE — EVACUATOR WOUND BULB 100CC

## (undated) DEVICE — SUT 3-0 VICRYL SH CR/8 18

## (undated) DEVICE — NDL INSUFFLATION VERRES 120MM

## (undated) DEVICE — SOL IRR SOD CHL .9% POUR

## (undated) DEVICE — GLOVE SENSICARE PI SURG 6

## (undated) DEVICE — OBTURATOR BLADELESS 8MM XI

## (undated) DEVICE — STAPLER ECHELON PWR CIR 29MM

## (undated) DEVICE — RELOAD SUREFORM 60 3.5 BLU 6R

## (undated) DEVICE — SOL CLEARIFY VISUALIZATION LAP

## (undated) DEVICE — SUT PDS II 1 CT VIL MONO 36

## (undated) DEVICE — CANNULA REDUCER 12-8MM

## (undated) DEVICE — SCRUB 10% POVIDONE IODINE 4OZ

## (undated) DEVICE — TOWEL OR DISP STRL BLUE 4/PK

## (undated) DEVICE — SUT 2-0 12-18IN SILK

## (undated) DEVICE — CLIPPER BLADE MOD 4406 (CAREF)

## (undated) DEVICE — TRAY CATH 1-LYR URIMTR 16FR

## (undated) DEVICE — SUT PDS II 0 CT VIL MONO 36

## (undated) DEVICE — SUT VICRYL 3-0 27 SH

## (undated) DEVICE — TUBING SUC UNIV W/CONN 12FT

## (undated) DEVICE — SUT SILK 3-0 SH 18IN BLACK

## (undated) DEVICE — SUT 2-0 VICRYL / SH (J417)

## (undated) DEVICE — Device

## (undated) DEVICE — KIT WING PAD POSITIONING

## (undated) DEVICE — APPLIER LIGACLIP MED 11IN

## (undated) DEVICE — APPLICATOR CHLORAPREP ORN 26ML

## (undated) DEVICE — SUT 0 54IN COATED VICRYL U

## (undated) DEVICE — CATH ALL PUR URTHL 20FR

## (undated) DEVICE — SUT 3-0 ETHILON 18 FS-1

## (undated) DEVICE — DRESSING ABSRBNT ISLAND 3.6X8

## (undated) DEVICE — SYR IRRIGATION BULB STER 60ML

## (undated) DEVICE — RELOAD SUREFORM 45 3.5 BLU 6R

## (undated) DEVICE — DRAPE LAPSCP CHOLE 122X102X78

## (undated) DEVICE — ELECTRODE BLADE TEFLON 6

## (undated) DEVICE — ADHESIVE DERMABOND ADVANCED

## (undated) DEVICE — SEALER LIGASURE IMPACT 18CM

## (undated) DEVICE — COVER TIP CURVED SCISSORS XI

## (undated) DEVICE — LUBRICANT SURGILUBE 2 OZ

## (undated) DEVICE — SUT 1 36IN PDS II VIO MONO

## (undated) DEVICE — PORT ACCESS 5MM W/120MM

## (undated) DEVICE — DRAPE ARM DAVINCI XI

## (undated) DEVICE — SUT VICRYL PLUS 3-0 18IN

## (undated) DEVICE — TIP YANKAUERS BULB NO VENT

## (undated) DEVICE — PACK ROBOTIC

## (undated) DEVICE — SYR 50ML CATH TIP

## (undated) DEVICE — SUT 3-0 VICRYL / SH (J416)

## (undated) DEVICE — SEE MEDLINE ITEM 146420

## (undated) DEVICE — SUT V-LOC 180 ABD 2/0 GS-21

## (undated) DEVICE — SEAL UNIVERSAL 5MM-8MM XI

## (undated) DEVICE — SEALER VESSEL EXTEND

## (undated) DEVICE — SUT MCRYL PLUS 4-0 PS2 27IN